# Patient Record
Sex: FEMALE | Race: WHITE | Employment: UNEMPLOYED | URBAN - METROPOLITAN AREA
[De-identification: names, ages, dates, MRNs, and addresses within clinical notes are randomized per-mention and may not be internally consistent; named-entity substitution may affect disease eponyms.]

---

## 2017-05-10 ENCOUNTER — HOSPITAL ENCOUNTER (OUTPATIENT)
Dept: OTHER | Age: 82
Discharge: HOME | End: 2017-05-10
Attending: INTERNAL MEDICINE | Admitting: INTERNAL MEDICINE

## 2017-05-10 LAB
FERRITIN: 168 NG/ML (ref 15–150)
FOLATE: 14.8 NG/ML (ref 3.1–17.5)
IRON: 63 UG/DL (ref 37–145)
PCT TRANSFERRIN: 24 % (ref 10–44)
T3 FREE: 2.4 PG/ML (ref 2.3–4.2)
T4 FREE: 1.67 NG/DL (ref 0.9–1.8)
TOTAL IRON BINDING CAPACITY: 267 UG/DL (ref 250–450)
TSH HIGH SENSITIVITY: 1.83 UIU/ML (ref 0.27–4.2)
UNSATURATED IRON BINDING CAPACITY: 204 UG/DL (ref 110–370)
VITAMIN B-12: 214 PG/ML (ref 211–911)

## 2017-05-12 LAB — T4 TOTAL: 9.54 UG/DL

## 2017-11-15 ENCOUNTER — HOSPITAL ENCOUNTER (OUTPATIENT)
Dept: OTHER | Age: 82
Discharge: HOME | End: 2017-11-15
Attending: INTERNAL MEDICINE | Admitting: INTERNAL MEDICINE

## 2017-11-15 LAB — VITAMIN B-12: 393.4 PG/ML (ref 211–911)

## 2017-11-18 LAB — METHYLMALONIC ACID: 0.16

## 2019-05-13 ENCOUNTER — APPOINTMENT (OUTPATIENT)
Dept: GENERAL RADIOLOGY | Age: 84
DRG: 470 | End: 2019-05-13
Payer: COMMERCIAL

## 2019-05-13 ENCOUNTER — HOSPITAL ENCOUNTER (INPATIENT)
Age: 84
LOS: 4 days | Discharge: NURSING, REHAB OR LONG-TERM CARE | DRG: 470 | End: 2019-05-18
Attending: EMERGENCY MEDICINE | Admitting: INTERNAL MEDICINE
Payer: COMMERCIAL
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
## 2019-05-13 DIAGNOSIS — S72.002A LEFT DISPLACED FEMORAL NECK FRACTURE (HCC): Primary | ICD-10-CM

## 2019-05-13 LAB
ALBUMIN SERPL-MCNC: 4.1 GM/DL (ref 3.4–5)
ALP BLD-CCNC: 87 IU/L (ref 40–129)
ALT SERPL-CCNC: 16 U/L (ref 10–40)
ANION GAP SERPL CALCULATED.3IONS-SCNC: 12 MMOL/L (ref 4–16)
APTT: 24 SECONDS (ref 21.2–33)
AST SERPL-CCNC: 23 IU/L (ref 15–37)
BASOPHILS ABSOLUTE: 0.1 K/CU MM
BASOPHILS RELATIVE PERCENT: 0.6 % (ref 0–1)
BILIRUB SERPL-MCNC: 0.7 MG/DL (ref 0–1)
BUN BLDV-MCNC: 34 MG/DL (ref 6–23)
CALCIUM SERPL-MCNC: 9.1 MG/DL (ref 8.3–10.6)
CHLORIDE BLD-SCNC: 104 MMOL/L (ref 99–110)
CO2: 25 MMOL/L (ref 21–32)
CREAT SERPL-MCNC: 0.9 MG/DL (ref 0.6–1.1)
DIFFERENTIAL TYPE: ABNORMAL
EOSINOPHILS ABSOLUTE: 0.1 K/CU MM
EOSINOPHILS RELATIVE PERCENT: 1.3 % (ref 0–3)
GFR AFRICAN AMERICAN: >60 ML/MIN/1.73M2
GFR NON-AFRICAN AMERICAN: 59 ML/MIN/1.73M2
GLUCOSE BLD-MCNC: 106 MG/DL (ref 70–99)
HCT VFR BLD CALC: 36.7 % (ref 37–47)
HEMOGLOBIN: 11.1 GM/DL (ref 12.5–16)
IMMATURE NEUTROPHIL %: 0.5 % (ref 0–0.43)
INR BLD: 0.93 INDEX
LYMPHOCYTES ABSOLUTE: 0.9 K/CU MM
LYMPHOCYTES RELATIVE PERCENT: 8.3 % (ref 24–44)
MCH RBC QN AUTO: 28.5 PG (ref 27–31)
MCHC RBC AUTO-ENTMCNC: 30.2 % (ref 32–36)
MCV RBC AUTO: 94.3 FL (ref 78–100)
MONOCYTES ABSOLUTE: 0.7 K/CU MM
MONOCYTES RELATIVE PERCENT: 7.3 % (ref 0–4)
NUCLEATED RBC %: 0 %
PDW BLD-RTO: 14.6 % (ref 11.7–14.9)
PLATELET # BLD: 244 K/CU MM (ref 140–440)
PMV BLD AUTO: 9.3 FL (ref 7.5–11.1)
POTASSIUM SERPL-SCNC: 4.2 MMOL/L (ref 3.5–5.1)
PROTHROMBIN TIME: 10.6 SECONDS (ref 9.12–12.5)
RBC # BLD: 3.89 M/CU MM (ref 4.2–5.4)
SEGMENTED NEUTROPHILS ABSOLUTE COUNT: 8.4 K/CU MM
SEGMENTED NEUTROPHILS RELATIVE PERCENT: 82 % (ref 36–66)
SODIUM BLD-SCNC: 141 MMOL/L (ref 135–145)
TOTAL IMMATURE NEUTOROPHIL: 0.05 K/CU MM
TOTAL NUCLEATED RBC: 0 K/CU MM
TOTAL PROTEIN: 7.1 GM/DL (ref 6.4–8.2)
WBC # BLD: 10.2 K/CU MM (ref 4–10.5)

## 2019-05-13 PROCEDURE — 99284 EMERGENCY DEPT VISIT MOD MDM: CPT

## 2019-05-13 PROCEDURE — 72170 X-RAY EXAM OF PELVIS: CPT

## 2019-05-13 PROCEDURE — 86901 BLOOD TYPING SEROLOGIC RH(D): CPT

## 2019-05-13 PROCEDURE — 85610 PROTHROMBIN TIME: CPT

## 2019-05-13 PROCEDURE — 71045 X-RAY EXAM CHEST 1 VIEW: CPT

## 2019-05-13 PROCEDURE — 73552 X-RAY EXAM OF FEMUR 2/>: CPT

## 2019-05-13 PROCEDURE — 96374 THER/PROPH/DIAG INJ IV PUSH: CPT

## 2019-05-13 PROCEDURE — 85025 COMPLETE CBC W/AUTO DIFF WBC: CPT

## 2019-05-13 PROCEDURE — 6360000002 HC RX W HCPCS: Performed by: EMERGENCY MEDICINE

## 2019-05-13 PROCEDURE — 86850 RBC ANTIBODY SCREEN: CPT

## 2019-05-13 PROCEDURE — 85730 THROMBOPLASTIN TIME PARTIAL: CPT

## 2019-05-13 PROCEDURE — 96376 TX/PRO/DX INJ SAME DRUG ADON: CPT

## 2019-05-13 PROCEDURE — 73560 X-RAY EXAM OF KNEE 1 OR 2: CPT

## 2019-05-13 PROCEDURE — 36415 COLL VENOUS BLD VENIPUNCTURE: CPT

## 2019-05-13 PROCEDURE — 80053 COMPREHEN METABOLIC PANEL: CPT

## 2019-05-13 PROCEDURE — 86900 BLOOD TYPING SEROLOGIC ABO: CPT

## 2019-05-13 RX ORDER — FENTANYL CITRATE 50 UG/ML
50 INJECTION, SOLUTION INTRAMUSCULAR; INTRAVENOUS EVERY 30 MIN PRN
Status: COMPLETED | OUTPATIENT
Start: 2019-05-13 | End: 2019-05-14

## 2019-05-13 RX ADMIN — FENTANYL CITRATE 50 MCG: 50 INJECTION INTRAMUSCULAR; INTRAVENOUS at 23:19

## 2019-05-13 RX ADMIN — FENTANYL CITRATE 50 MCG: 50 INJECTION INTRAMUSCULAR; INTRAVENOUS at 22:44

## 2019-05-13 ASSESSMENT — PAIN SCALES - GENERAL
PAINLEVEL_OUTOF10: 10
PAINLEVEL_OUTOF10: 9
PAINLEVEL_OUTOF10: 10

## 2019-05-14 ENCOUNTER — APPOINTMENT (OUTPATIENT)
Dept: GENERAL RADIOLOGY | Age: 84
DRG: 470 | End: 2019-05-14
Payer: COMMERCIAL

## 2019-05-14 ENCOUNTER — ANESTHESIA (OUTPATIENT)
Dept: OPERATING ROOM | Age: 84
DRG: 470 | End: 2019-05-14
Payer: COMMERCIAL

## 2019-05-14 ENCOUNTER — ANESTHESIA EVENT (OUTPATIENT)
Dept: OPERATING ROOM | Age: 84
DRG: 470 | End: 2019-05-14
Payer: COMMERCIAL

## 2019-05-14 VITALS — DIASTOLIC BLOOD PRESSURE: 43 MMHG | TEMPERATURE: 98.6 F | SYSTOLIC BLOOD PRESSURE: 91 MMHG | OXYGEN SATURATION: 93 %

## 2019-05-14 PROBLEM — F03.918 DEMENTIA, IN, SENILITY, WITH BEHAVIORAL DISTURBANCE: Status: ACTIVE | Noted: 2019-05-14

## 2019-05-14 PROBLEM — I25.10 CAD (CORONARY ARTERY DISEASE): Status: ACTIVE | Noted: 2019-05-14

## 2019-05-14 PROBLEM — S72.002A CLOSED DISPLACED FRACTURE OF LEFT FEMORAL NECK (HCC): Status: ACTIVE | Noted: 2019-05-14

## 2019-05-14 PROBLEM — C94.6 MYELODYSPLASTIC DISEASE (HCC): Status: ACTIVE | Noted: 2019-05-14

## 2019-05-14 LAB
ABO/RH: NORMAL
ANTIBODY SCREEN: NEGATIVE
LV EF: 50 %
LVEF MODALITY: NORMAL

## 2019-05-14 PROCEDURE — 76942 ECHO GUIDE FOR BIOPSY: CPT | Performed by: ANESTHESIOLOGY

## 2019-05-14 PROCEDURE — 6360000002 HC RX W HCPCS: Performed by: ORTHOPAEDIC SURGERY

## 2019-05-14 PROCEDURE — 7100000000 HC PACU RECOVERY - FIRST 15 MIN: Performed by: ORTHOPAEDIC SURGERY

## 2019-05-14 PROCEDURE — 2500000003 HC RX 250 WO HCPCS: Performed by: ORTHOPAEDIC SURGERY

## 2019-05-14 PROCEDURE — 3600000014 HC SURGERY LEVEL 4 ADDTL 15MIN: Performed by: ORTHOPAEDIC SURGERY

## 2019-05-14 PROCEDURE — 6360000002 HC RX W HCPCS: Performed by: ANESTHESIOLOGY

## 2019-05-14 PROCEDURE — 3E0T3BZ INTRODUCTION OF ANESTHETIC AGENT INTO PERIPHERAL NERVES AND PLEXI, PERCUTANEOUS APPROACH: ICD-10-PCS | Performed by: ANESTHESIOLOGY

## 2019-05-14 PROCEDURE — C1713 ANCHOR/SCREW BN/BN,TIS/BN: HCPCS | Performed by: ORTHOPAEDIC SURGERY

## 2019-05-14 PROCEDURE — 96376 TX/PRO/DX INJ SAME DRUG ADON: CPT

## 2019-05-14 PROCEDURE — 3700000000 HC ANESTHESIA ATTENDED CARE: Performed by: ORTHOPAEDIC SURGERY

## 2019-05-14 PROCEDURE — 2700000000 HC OXYGEN THERAPY PER DAY

## 2019-05-14 PROCEDURE — 96375 TX/PRO/DX INJ NEW DRUG ADDON: CPT

## 2019-05-14 PROCEDURE — 0SRS01A REPLACEMENT OF LEFT HIP JOINT, FEMORAL SURFACE WITH METAL SYNTHETIC SUBSTITUTE, UNCEMENTED, OPEN APPROACH: ICD-10-PCS | Performed by: UROLOGY

## 2019-05-14 PROCEDURE — P9045 ALBUMIN (HUMAN), 5%, 250 ML: HCPCS | Performed by: NURSE ANESTHETIST, CERTIFIED REGISTERED

## 2019-05-14 PROCEDURE — 94761 N-INVAS EAR/PLS OXIMETRY MLT: CPT

## 2019-05-14 PROCEDURE — 1200000000 HC SEMI PRIVATE

## 2019-05-14 PROCEDURE — 6360000002 HC RX W HCPCS

## 2019-05-14 PROCEDURE — 6370000000 HC RX 637 (ALT 250 FOR IP): Performed by: INTERNAL MEDICINE

## 2019-05-14 PROCEDURE — 7100000001 HC PACU RECOVERY - ADDTL 15 MIN: Performed by: ORTHOPAEDIC SURGERY

## 2019-05-14 PROCEDURE — 3600000004 HC SURGERY LEVEL 4 BASE: Performed by: ORTHOPAEDIC SURGERY

## 2019-05-14 PROCEDURE — 6360000002 HC RX W HCPCS: Performed by: INTERNAL MEDICINE

## 2019-05-14 PROCEDURE — 2709999900 HC NON-CHARGEABLE SUPPLY: Performed by: ORTHOPAEDIC SURGERY

## 2019-05-14 PROCEDURE — 2580000003 HC RX 258: Performed by: INTERNAL MEDICINE

## 2019-05-14 PROCEDURE — 93306 TTE W/DOPPLER COMPLETE: CPT

## 2019-05-14 PROCEDURE — 6360000002 HC RX W HCPCS: Performed by: EMERGENCY MEDICINE

## 2019-05-14 PROCEDURE — 2580000003 HC RX 258: Performed by: ORTHOPAEDIC SURGERY

## 2019-05-14 PROCEDURE — 2500000003 HC RX 250 WO HCPCS: Performed by: NURSE ANESTHETIST, CERTIFIED REGISTERED

## 2019-05-14 PROCEDURE — 2580000003 HC RX 258: Performed by: EMERGENCY MEDICINE

## 2019-05-14 PROCEDURE — 73501 X-RAY EXAM HIP UNI 1 VIEW: CPT

## 2019-05-14 PROCEDURE — 3700000001 HC ADD 15 MINUTES (ANESTHESIA): Performed by: ORTHOPAEDIC SURGERY

## 2019-05-14 PROCEDURE — 6360000002 HC RX W HCPCS: Performed by: NURSE ANESTHETIST, CERTIFIED REGISTERED

## 2019-05-14 PROCEDURE — C1776 JOINT DEVICE (IMPLANTABLE): HCPCS | Performed by: ORTHOPAEDIC SURGERY

## 2019-05-14 PROCEDURE — 6370000000 HC RX 637 (ALT 250 FOR IP): Performed by: ORTHOPAEDIC SURGERY

## 2019-05-14 DEVICE — IMPLANTABLE DEVICE: Type: IMPLANTABLE DEVICE | Site: HIP | Status: FUNCTIONAL

## 2019-05-14 DEVICE — ADAPTER FEM L+0MM UPLR NEUT NK: Type: IMPLANTABLE DEVICE | Site: HIP | Status: FUNCTIONAL

## 2019-05-14 DEVICE — CABLE SURG L635MM DIA1.8MM CO CHROM CERCLAGE CBL RDY: Type: IMPLANTABLE DEVICE | Site: HIP | Status: FUNCTIONAL

## 2019-05-14 DEVICE — AVENIR MÜLLER STEM 4 STANDARD
Type: IMPLANTABLE DEVICE | Site: HIP | Status: FUNCTIONAL
Brand: AVENIR® MÜLLER

## 2019-05-14 RX ORDER — ONDANSETRON 4 MG/1
4 TABLET, ORALLY DISINTEGRATING ORAL EVERY 8 HOURS PRN
Status: DISCONTINUED | OUTPATIENT
Start: 2019-05-14 | End: 2019-05-14

## 2019-05-14 RX ORDER — SODIUM CHLORIDE 9 MG/ML
INJECTION, SOLUTION INTRAVENOUS CONTINUOUS
Status: DISCONTINUED | OUTPATIENT
Start: 2019-05-14 | End: 2019-05-14

## 2019-05-14 RX ORDER — HYDROMORPHONE HCL 110MG/55ML
0.5 PATIENT CONTROLLED ANALGESIA SYRINGE INTRAVENOUS
Status: DISCONTINUED | OUTPATIENT
Start: 2019-05-14 | End: 2019-05-14

## 2019-05-14 RX ORDER — HYDROMORPHONE HCL 110MG/55ML
0.5 PATIENT CONTROLLED ANALGESIA SYRINGE INTRAVENOUS ONCE
Status: COMPLETED | OUTPATIENT
Start: 2019-05-14 | End: 2019-05-14

## 2019-05-14 RX ORDER — HYDROMORPHONE HCL 110MG/55ML
1 PATIENT CONTROLLED ANALGESIA SYRINGE INTRAVENOUS
Status: DISCONTINUED | OUTPATIENT
Start: 2019-05-14 | End: 2019-05-14

## 2019-05-14 RX ORDER — DONEPEZIL HYDROCHLORIDE 5 MG/1
5 TABLET, FILM COATED ORAL NIGHTLY
Status: ON HOLD | COMMUNITY
End: 2019-05-18 | Stop reason: HOSPADM

## 2019-05-14 RX ORDER — ALBUMIN, HUMAN INJ 5% 5 %
SOLUTION INTRAVENOUS PRN
Status: DISCONTINUED | OUTPATIENT
Start: 2019-05-14 | End: 2019-05-14 | Stop reason: SDUPTHER

## 2019-05-14 RX ORDER — LOSARTAN POTASSIUM 100 MG/1
100 TABLET ORAL DAILY
Status: DISCONTINUED | OUTPATIENT
Start: 2019-05-14 | End: 2019-05-15

## 2019-05-14 RX ORDER — HYDROMORPHONE HCL 110MG/55ML
PATIENT CONTROLLED ANALGESIA SYRINGE INTRAVENOUS
Status: COMPLETED
Start: 2019-05-14 | End: 2019-05-14

## 2019-05-14 RX ORDER — OXYCODONE HYDROCHLORIDE 5 MG/1
5 TABLET ORAL EVERY 4 HOURS PRN
Status: DISCONTINUED | OUTPATIENT
Start: 2019-05-14 | End: 2019-05-15

## 2019-05-14 RX ORDER — CARVEDILOL 25 MG/1
25 TABLET ORAL 2 TIMES DAILY WITH MEALS
Status: DISCONTINUED | OUTPATIENT
Start: 2019-05-14 | End: 2019-05-16

## 2019-05-14 RX ORDER — SODIUM CHLORIDE 0.9 % (FLUSH) 0.9 %
10 SYRINGE (ML) INJECTION PRN
Status: DISCONTINUED | OUTPATIENT
Start: 2019-05-14 | End: 2019-05-18 | Stop reason: HOSPADM

## 2019-05-14 RX ORDER — PROPOFOL 10 MG/ML
INJECTION, EMULSION INTRAVENOUS CONTINUOUS PRN
Status: DISCONTINUED | OUTPATIENT
Start: 2019-05-14 | End: 2019-05-14 | Stop reason: SDUPTHER

## 2019-05-14 RX ORDER — SODIUM CHLORIDE 0.9 % (FLUSH) 0.9 %
10 SYRINGE (ML) INJECTION EVERY 12 HOURS SCHEDULED
Status: DISCONTINUED | OUTPATIENT
Start: 2019-05-14 | End: 2019-05-14

## 2019-05-14 RX ORDER — EPHEDRINE SULFATE 50 MG/ML
INJECTION INTRAVENOUS PRN
Status: DISCONTINUED | OUTPATIENT
Start: 2019-05-14 | End: 2019-05-14 | Stop reason: SDUPTHER

## 2019-05-14 RX ORDER — SODIUM CHLORIDE 0.9 % (FLUSH) 0.9 %
10 SYRINGE (ML) INJECTION EVERY 12 HOURS SCHEDULED
Status: DISCONTINUED | OUTPATIENT
Start: 2019-05-14 | End: 2019-05-18 | Stop reason: HOSPADM

## 2019-05-14 RX ORDER — ACETAMINOPHEN 10 MG/ML
1000 INJECTION, SOLUTION INTRAVENOUS ONCE
Status: COMPLETED | OUTPATIENT
Start: 2019-05-14 | End: 2019-05-14

## 2019-05-14 RX ORDER — ACETAMINOPHEN 325 MG/1
650 TABLET ORAL EVERY 6 HOURS SCHEDULED
Status: DISCONTINUED | OUTPATIENT
Start: 2019-05-14 | End: 2019-05-18 | Stop reason: HOSPADM

## 2019-05-14 RX ORDER — ACETAMINOPHEN 325 MG/1
650 TABLET ORAL EVERY 4 HOURS PRN
Status: DISCONTINUED | OUTPATIENT
Start: 2019-05-14 | End: 2019-05-14

## 2019-05-14 RX ORDER — SODIUM CHLORIDE 0.9 % (FLUSH) 0.9 %
10 SYRINGE (ML) INJECTION PRN
Status: DISCONTINUED | OUTPATIENT
Start: 2019-05-14 | End: 2019-05-14

## 2019-05-14 RX ORDER — HYDROMORPHONE HCL 110MG/55ML
1 PATIENT CONTROLLED ANALGESIA SYRINGE INTRAVENOUS
Status: DISCONTINUED | OUTPATIENT
Start: 2019-05-14 | End: 2019-05-15

## 2019-05-14 RX ORDER — ONDANSETRON 2 MG/ML
4 INJECTION INTRAMUSCULAR; INTRAVENOUS EVERY 6 HOURS PRN
Status: DISCONTINUED | OUTPATIENT
Start: 2019-05-14 | End: 2019-05-18 | Stop reason: HOSPADM

## 2019-05-14 RX ORDER — HYDROMORPHONE HCL 110MG/55ML
0.5 PATIENT CONTROLLED ANALGESIA SYRINGE INTRAVENOUS
Status: DISCONTINUED | OUTPATIENT
Start: 2019-05-14 | End: 2019-05-15

## 2019-05-14 RX ORDER — LEVOTHYROXINE SODIUM 0.07 MG/1
75 TABLET ORAL DAILY
Status: DISCONTINUED | OUTPATIENT
Start: 2019-05-14 | End: 2019-05-18 | Stop reason: HOSPADM

## 2019-05-14 RX ORDER — CLINDAMYCIN PHOSPHATE 150 MG/ML
600 INJECTION, SOLUTION INTRAVENOUS ONCE
Status: DISCONTINUED | OUTPATIENT
Start: 2019-05-14 | End: 2019-05-14 | Stop reason: ALTCHOICE

## 2019-05-14 RX ORDER — DEXTROSE, SODIUM CHLORIDE, AND POTASSIUM CHLORIDE 5; .45; .15 G/100ML; G/100ML; G/100ML
INJECTION INTRAVENOUS CONTINUOUS
Status: DISCONTINUED | OUTPATIENT
Start: 2019-05-14 | End: 2019-05-18

## 2019-05-14 RX ORDER — HYDROMORPHONE HCL 110MG/55ML
0.25 PATIENT CONTROLLED ANALGESIA SYRINGE INTRAVENOUS
Status: DISCONTINUED | OUTPATIENT
Start: 2019-05-14 | End: 2019-05-14

## 2019-05-14 RX ORDER — LORAZEPAM 2 MG/ML
1 INJECTION INTRAMUSCULAR ONCE
Status: COMPLETED | OUTPATIENT
Start: 2019-05-14 | End: 2019-05-14

## 2019-05-14 RX ORDER — DOCUSATE SODIUM 100 MG/1
100 CAPSULE, LIQUID FILLED ORAL 2 TIMES DAILY
Status: DISCONTINUED | OUTPATIENT
Start: 2019-05-14 | End: 2019-05-18 | Stop reason: HOSPADM

## 2019-05-14 RX ORDER — HALOPERIDOL 5 MG/ML
1 INJECTION INTRAMUSCULAR EVERY 6 HOURS PRN
Status: DISCONTINUED | OUTPATIENT
Start: 2019-05-14 | End: 2019-05-15

## 2019-05-14 RX ADMIN — ALBUMIN (HUMAN) 250 ML: 12.5 INJECTION, SOLUTION INTRAVENOUS at 17:09

## 2019-05-14 RX ADMIN — PHENYLEPHRINE HYDROCHLORIDE 100 MCG: 10 INJECTION INTRAVENOUS at 17:04

## 2019-05-14 RX ADMIN — PHENYLEPHRINE HYDROCHLORIDE 200 MCG: 10 INJECTION INTRAVENOUS at 16:50

## 2019-05-14 RX ADMIN — EPHEDRINE SULFATE 15 MG: 50 INJECTION, SOLUTION INTRAVENOUS at 17:19

## 2019-05-14 RX ADMIN — PHENYLEPHRINE HYDROCHLORIDE 100 MCG: 10 INJECTION INTRAVENOUS at 16:43

## 2019-05-14 RX ADMIN — PHENYLEPHRINE HYDROCHLORIDE 200 MCG: 10 INJECTION INTRAVENOUS at 17:14

## 2019-05-14 RX ADMIN — SODIUM CHLORIDE: 9 INJECTION, SOLUTION INTRAVENOUS at 13:48

## 2019-05-14 RX ADMIN — PHENYLEPHRINE HYDROCHLORIDE 100 MCG: 10 INJECTION INTRAVENOUS at 16:38

## 2019-05-14 RX ADMIN — PROPOFOL 50 MCG/KG/MIN: 10 INJECTION, EMULSION INTRAVENOUS at 16:12

## 2019-05-14 RX ADMIN — FENTANYL CITRATE 50 MCG: 50 INJECTION INTRAMUSCULAR; INTRAVENOUS at 00:02

## 2019-05-14 RX ADMIN — EPHEDRINE SULFATE 10 MG: 50 INJECTION, SOLUTION INTRAVENOUS at 17:21

## 2019-05-14 RX ADMIN — LORAZEPAM 1 MG: 2 INJECTION INTRAMUSCULAR; INTRAVENOUS at 10:37

## 2019-05-14 RX ADMIN — ACETAMINOPHEN 650 MG: 325 TABLET ORAL at 22:34

## 2019-05-14 RX ADMIN — SODIUM CHLORIDE: 9 INJECTION, SOLUTION INTRAVENOUS at 01:46

## 2019-05-14 RX ADMIN — PHENYLEPHRINE HYDROCHLORIDE 200 MCG: 10 INJECTION INTRAVENOUS at 17:11

## 2019-05-14 RX ADMIN — HYDROMORPHONE HYDROCHLORIDE 0.5 MG: 2 INJECTION, SOLUTION INTRAMUSCULAR; INTRAVENOUS; SUBCUTANEOUS at 05:54

## 2019-05-14 RX ADMIN — EPHEDRINE SULFATE 25 MG: 50 INJECTION, SOLUTION INTRAVENOUS at 17:18

## 2019-05-14 RX ADMIN — HYDROMORPHONE HYDROCHLORIDE 0.5 MG: 2 INJECTION, SOLUTION INTRAMUSCULAR; INTRAVENOUS; SUBCUTANEOUS at 02:57

## 2019-05-14 RX ADMIN — CARVEDILOL 25 MG: 25 TABLET, FILM COATED ORAL at 09:18

## 2019-05-14 RX ADMIN — DEXTROSE MONOHYDRATE, SODIUM CHLORIDE, AND POTASSIUM CHLORIDE: 50; 4.5; 1.49 INJECTION, SOLUTION INTRAVENOUS at 18:12

## 2019-05-14 RX ADMIN — ACETAMINOPHEN 1000 MG: 10 INJECTION, SOLUTION INTRAVENOUS at 15:15

## 2019-05-14 RX ADMIN — PHENYLEPHRINE HYDROCHLORIDE 100 MCG: 10 INJECTION INTRAVENOUS at 16:47

## 2019-05-14 RX ADMIN — PHENYLEPHRINE HYDROCHLORIDE 200 MCG: 10 INJECTION INTRAVENOUS at 17:06

## 2019-05-14 RX ADMIN — DEXTROSE MONOHYDRATE 600 MG: 50 INJECTION, SOLUTION INTRAVENOUS at 16:27

## 2019-05-14 RX ADMIN — PHENYLEPHRINE HYDROCHLORIDE 100 MCG: 10 INJECTION INTRAVENOUS at 17:00

## 2019-05-14 RX ADMIN — PHENYLEPHRINE HYDROCHLORIDE 100 MCG: 10 INJECTION INTRAVENOUS at 16:30

## 2019-05-14 RX ADMIN — PHENYLEPHRINE HYDROCHLORIDE 100 MCG: 10 INJECTION INTRAVENOUS at 16:45

## 2019-05-14 RX ADMIN — HYDROMORPHONE HYDROCHLORIDE 0.5 MG: 2 INJECTION, SOLUTION INTRAMUSCULAR; INTRAVENOUS; SUBCUTANEOUS at 01:00

## 2019-05-14 RX ADMIN — PHENYLEPHRINE HYDROCHLORIDE 100 MCG: 10 INJECTION INTRAVENOUS at 16:41

## 2019-05-14 RX ADMIN — DOCUSATE SODIUM 100 MG: 100 CAPSULE, LIQUID FILLED ORAL at 22:34

## 2019-05-14 RX ADMIN — HYDROMORPHONE HYDROCHLORIDE 1 MG: 2 INJECTION, SOLUTION INTRAMUSCULAR; INTRAVENOUS; SUBCUTANEOUS at 09:18

## 2019-05-14 ASSESSMENT — PULMONARY FUNCTION TESTS
PIF_VALUE: 1
PIF_VALUE: 0
PIF_VALUE: 0
PIF_VALUE: 1
PIF_VALUE: 0
PIF_VALUE: 1
PIF_VALUE: 0
PIF_VALUE: 1
PIF_VALUE: 0
PIF_VALUE: 1
PIF_VALUE: 0
PIF_VALUE: 1
PIF_VALUE: 0
PIF_VALUE: 0
PIF_VALUE: 1
PIF_VALUE: 0

## 2019-05-14 ASSESSMENT — PAIN SCALES - GENERAL
PAINLEVEL_OUTOF10: 10
PAINLEVEL_OUTOF10: 0
PAINLEVEL_OUTOF10: 10
PAINLEVEL_OUTOF10: 0
PAINLEVEL_OUTOF10: 6
PAINLEVEL_OUTOF10: 10
PAINLEVEL_OUTOF10: 10
PAINLEVEL_OUTOF10: 0
PAINLEVEL_OUTOF10: 10
PAINLEVEL_OUTOF10: 6

## 2019-05-14 NOTE — BRIEF OP NOTE
BRIEF OPERATIVE NOTE    Patient name  Mel Christensen  MRN    [id number]    Date of Procedure 5/14/2019        Preoperative Diagnosis: Left Femoral Neck Fracture    Postoperative Diagnosis:  Left Femoral Neck Fracture    Procedure:     Left Hip Hemiarthroplasty             Surgeon:     Mike Jimenez. Angela Rick MD    Assistant:     Feli Tavarez PA-C    Anesthesia:    Spinal Anesthesia and Regional    Estimated blood loss:  200 ml    Drains:     1 Medium Hemovac    Specimens:   None    Complications:     None      Mike Jimenez.  Angela Rick MD

## 2019-05-14 NOTE — ANESTHESIA PRE PROCEDURE
Department of Anesthesiology  Preprocedure Note       Name:  Cliff Marquez   Age:  80 y.o.  :  10/2/1931                                          MRN:  [id number]         Date:  2019      Surgeon: Estefania Davis):  Jerome Paget, MD    Procedure: HIP HEMIARTHROPLASTY (Left )    Medications prior to admission:   Prior to Admission medications    Medication Sig Start Date End Date Taking? Authorizing Provider   donepezil (ARICEPT) 5 MG tablet Take 5 mg by mouth nightly    Historical Provider, MD   guaiFENesin-codeine (GUAIFENESIN AC) 100-10 MG/5ML liquid Take 10 mLs by mouth every 6 hours as needed for Cough. 14   Filomena Hensley MD   carvedilol (COREG) 25 MG tablet Take 25 mg by mouth 2 times daily (with meals). Historical Provider, MD   losartan (COZAAR) 100 MG tablet Take 100 mg by mouth daily. Historical Provider, MD   MAGNESIUM-ZINC PO Take 1 tablet by mouth daily. Historical Provider, MD   levothyroxine (SYNTHROID) 75 MCG tablet Take 75 mcg by mouth Daily. Historical Provider, MD   aspirin 81 MG tablet Take 81 mg by mouth daily.     Historical Provider, MD       Current medications:    Current Facility-Administered Medications   Medication Dose Route Frequency Provider Last Rate Last Dose    0.9 % sodium chloride infusion   Intravenous Continuous Philip Hu  mL/hr at 19 0146      carvedilol (COREG) tablet 25 mg  25 mg Oral BID WC Philip Hu MD   25 mg at 19 0918    levothyroxine (SYNTHROID) tablet 75 mcg  75 mcg Oral Daily Philip Hu MD        losartan (COZAAR) tablet 100 mg  100 mg Oral Daily Philip Hu MD        sodium chloride flush 0.9 % injection 10 mL  10 mL Intravenous 2 times per day Philip Hu MD        sodium chloride flush 0.9 % injection 10 mL  10 mL Intravenous PRN Philip Hu MD        acetaminophen (TYLENOL) tablet 650 mg  650 mg Oral Q4H PRN Philip Hu MD        ondansetron (ZOFRAN-ODT) disintegrating tablet 4 mg  4 mg Oral Q8H PRN Mariela Hi MD        HYDROmorphone (DILAUDID) injection 0.5 mg  0.5 mg Intravenous Q3H PRN Taylor Mckeon MD        Or    HYDROmorphone (DILAUDID) injection 1 mg  1 mg Intravenous Q3H PRN Taylor Mckeon MD   1 mg at 05/14/19 4739       Allergies: Allergies   Allergen Reactions    Pcn [Penicillins]        Problem List:    Patient Active Problem List   Diagnosis Code    Chest pain R07.9    Closed displaced fracture of left femoral neck ([facility] [address] S72.002A       Past Medical History:        Diagnosis Date    Hypertension     Thyroid disease        Past Surgical History:  No past surgical history on file. Social History:    Social History     Tobacco Use    Smoking status: Never Smoker   Substance Use Topics    Alcohol use: No                                Counseling given: Not Answered      Vital Signs (Current):   Vitals:    05/14/19 0200 05/14/19 0230 05/14/19 0559 05/14/19 0918   BP: 132/63 (!) 142/74 135/63 (!) 160/90   Pulse: 91 94 95 98   Resp: 27 22 17    Temp:  36.7 °C (98.1 °F) 36.9 °C (98.5 °F)    TempSrc:  Oral Oral    SpO2: 97% 99% 99%    Weight:       Height:                                                  BP Readings from Last 3 Encounters:   05/14/19 (!) 160/90   07/18/16 146/78       NPO Status:                                                                                 BMI:   Wt Readings from Last 3 Encounters:   05/13/19 139 lb 15.9 oz (63.5 kg)   07/18/16 140 lb (63.5 kg)     Body mass index is 27.48 kg/m².     CBC:   Lab Results   Component Value Date    WBC 10.2 05/13/2019    RBC 3.89 05/13/2019    HGB 11.1 05/13/2019    HCT 36.7 05/13/2019    MCV 94.3 05/13/2019    RDW 14.6 05/13/2019     05/13/2019       CMP:   Lab Results   Component Value Date     05/13/2019    K 4.2 05/13/2019     05/13/2019    CO2 25 05/13/2019    BUN 34 05/13/2019    CREATININE 0.9 05/13/2019    GFRAA >60 05/13/2019    LABGLOM 59 05/13/2019    GLUCOSE 106 05/13/2019

## 2019-05-14 NOTE — ANESTHESIA PROCEDURE NOTES
Spinal Block    Patient location during procedure: OR  Start time: 5/14/2019 4:15 PM  End time: 5/14/2019 4:22 PM  Reason for block: primary anesthetic  Staffing  Anesthesiologist: Ellen Dutton MD  Resident/CRNA: CELI Barrios CRNA  Performed: resident/CRNA   Preanesthetic Checklist  Completed: patient identified, site marked, surgical consent, pre-op evaluation, timeout performed, IV checked, risks and benefits discussed, monitors and equipment checked, anesthesia consent given, oxygen available and patient being monitored  Spinal Block  Patient position: left lateral decubitus  Prep: ChloraPrep  Patient monitoring: cardiac monitor, continuous pulse ox, continuous capnometry and frequent blood pressure checks  Approach: midline  Location: L3/L4  Provider prep: mask and sterile gloves  Local infiltration: lidocaine  Agent: bupivacaine  Dose: 1.7  Dose: 1.7  Needle  Needle type: Quincke   Needle gauge: 22 G  Needle length: 3.5 in  Assessment  Sensory level: T6  Events: cerebrospinal fluid  Swirl obtained: Yes  CSF: clear  Attempts: 1  Hemodynamics: stable

## 2019-05-14 NOTE — ED NOTES
01:13 paged Dr Yola Coreas thru perfect serve     [facility] Sc[facility]  05/14/19 5447

## 2019-05-14 NOTE — ED PROVIDER NOTES
status: Never Smoker   Substance and Sexual Activity    Alcohol use: No    Drug use: No    Sexual activity: Not on file   Lifestyle    Physical activity:     Days per week: Not on file     Minutes per session: Not on file    Stress: Not on file   Relationships    Social connections:     Talks on phone: Not on file     Gets together: Not on file     Attends [employer]ism service: Not on file     Active member of club or organization: Not on file     Attends meetings of clubs or organizations: Not on file     Relationship status: Not on file    Intimate partner violence:     Fear of current or ex partner: Not on file     Emotionally abused: Not on file     Physically abused: Not on file     Forced sexual activity: Not on file   Other Topics Concern    Not on file   Social History Narrative    Not on file     Current Facility-Administered Medications   Medication Dose Route Frequency Provider Last Rate Last Dose    HYDROmorphone (DILAUDID) injection 0.5 mg  0.5 mg Intravenous Once Fara Phillips MD         Current Outpatient Medications   Medication Sig Dispense Refill    guaiFENesin-codeine (GUAIFENESIN AC) 100-10 MG/5ML liquid Take 10 mLs by mouth every 6 hours as needed for Cough. 420 mL 1    carvedilol (COREG) 25 MG tablet Take 25 mg by mouth 2 times daily (with meals).  losartan (COZAAR) 100 MG tablet Take 100 mg by mouth daily.  MAGNESIUM-ZINC PO Take 1 tablet by mouth daily.  levothyroxine (SYNTHROID) 75 MCG tablet Take 75 mcg by mouth Daily.  aspirin 81 MG tablet Take 81 mg by mouth daily. Allergies   Allergen Reactions    Pcn [Penicillins]        Nursing Notes Reviewed    Physical Exam:  ED Triage Vitals [05/13/19 2232]   Enc Vitals Group      BP (!) 157/66      Pulse 90      Resp 18      Temp 97.9 °F (36.6 °C)      Temp Source Oral      SpO2 98 %      Weight       Height       Head Circumference       Peak Flow       Pain Score       Pain Loc       Pain Edu? Excl.  in [address]? My pulse ox interpretation is - 98% on NC O2    General appearance:  No acute distress. Patient appears stated age. Pleasant. Skin:  Warm. Dry. No laceration or bleeding noted to the affected left hip or leg. Eye:  Extraocular movements intact. Ears, nose, mouth and throat:  No cephalohematoma, cordova sign or raccoon eyes. Midface is stable. No dental malocclusion. Neck:  Trachea midline. No midline bony cervical tenderness. Extremity:  No swelling other than the left hip and left knee. Normal ROM. No gross deformity ×3 extremities (see LLE). Extremities are nontender other than LLE.  LLE:  Left lower extremity is slightly shorter than right and held in external rotation. Point tenderness at the left hip and proximal thigh as well as left knee. Soft compartments. Strong DP and PT pulses are easily palpable. Sensation is intact globally light touch to distal left lower extremity. Strong and symmetric dorsi/plantar flexion. Patient easily able to wiggle all toes. Heart:  Regular rate and rhythm, normal S1 & S2, no extra heart sounds. Perfusion:  Intact   Respiratory:  Lungs clear to auscultation bilaterally. Respirations nonlabored. Chest wall is nontender. No crepitance. Abdominal:  Normal bowel sounds. Soft. Nontender. Non distended. Back:  No midline bony TL tenderness or step-off. Sacrum is with point tenderness palpation into the left gluteal region. Neurological:  Alert and oriented times 3. No focal neuro deficits. Sensation intact to light touch to distal upper/lower extremities; 5/5 and symmetric  and dorsi/plantar flexion. No saddle anesthesia.             Psychiatric:  Appropriate    I have reviewed and interpreted all of the currently available lab results from this visit (if applicable):  Results for orders placed or performed during the hospital encounter of 05/13/19   PT - INR   Result Value Ref Range    Protime 10.6 9.12 - 12.5 SECONDS    INR 0.93 INDEX PTT   Result Value Ref Range    aPTT 24.0 21.2 - 33.0 SECONDS   CBC auto diff   Result Value Ref Range    WBC 10.2 4.0 - 10.5 K/CU MM    RBC 3.89 (L) 4.2 - 5.4 M/CU MM    Hemoglobin 11.1 (L) 12.5 - 16.0 GM/DL    Hematocrit 36.7 (L) 37 - 47 %    MCV 94.3 78 - 100 FL    MCH 28.5 27 - 31 PG    MCHC 30.2 (L) 32.0 - 36.0 %    RDW 14.6 11.7 - 14.9 %    Platelets 349 276 - 574 K/CU MM    MPV 9.3 7.5 - 11.1 FL    Differential Type AUTOMATED DIFFERENTIAL     Segs Relative 82.0 (H) 36 - 66 %    Lymphocytes % 8.3 (L) 24 - 44 %    Monocytes % 7.3 (H) 0 - 4 %    Eosinophils % 1.3 0 - 3 %    Basophils % 0.6 0 - 1 %    Segs Absolute 8.4 K/CU MM    Lymphocytes # 0.9 K/CU MM    Monocytes # 0.7 K/CU MM    Eosinophils # 0.1 K/CU MM    Basophils # 0.1 K/CU MM    Nucleated RBC % 0.0 %    Total Nucleated RBC 0.0 K/CU MM    Total Immature Neutrophil 0.05 K/CU MM    Immature Neutrophil % 0.5 (H) 0 - 0.43 %   CMP   Result Value Ref Range    Sodium 141 135 - 145 MMOL/L    Potassium 4.2 3.5 - 5.1 MMOL/L    Chloride 104 99 - 110 mMol/L    CO2 25 21 - 32 MMOL/L    BUN 34 (H) 6 - 23 MG/DL    CREATININE 0.9 0.6 - 1.1 MG/DL    Glucose 106 (H) 70 - 99 MG/DL    Calcium 9.1 8.3 - 10.6 MG/DL    Alb 4.1 3.4 - 5.0 GM/DL    Total Protein 7.1 6.4 - 8.2 GM/DL    Total Bilirubin 0.7 0.0 - 1.0 MG/DL    ALT 16 10 - 40 U/L    AST 23 15 - 37 IU/L    Alkaline Phosphatase 87 40 - 129 IU/L    GFR Non- 59 (L) >60 mL/min/1.73m2    GFR African American >60 >60 mL/min/1.73m2    Anion Gap 12 4 - 16   TYPE AND SCREEN   Result Value Ref Range    ABO/Rh A POSITIVE     Antibody Screen NEGATIVE       Radiographs (if obtained):  [] The following radiograph was interpreted by myself in the absence of a radiologist:   [x] Radiologist's Report Reviewed:  XR KNEE LEFT (1-2 VIEWS)   Preliminary Result   Osteoarthritis involving the lateral compartment. No acute osseous   abnormality.          XR FEMUR LEFT (MIN 2 VIEWS)   Preliminary Result   Left femoral neck fracture. XR PELVIS (1-2 VIEWS)   Preliminary Result   Acute left femoral neck fracture with varus angulation and displacement. XR CHEST PORTABLE   Final Result   COPD. No acute findings. EKG (if obtained): (All EKG's are interpreted by myself in the absence of a cardiologist)    Chart review shows recent radiographs:  No results found. MDM:  Pt presents as above. Emergent conditions considered. Presentation prompted initial x-ray imaging as above. IV was confirmed and IV pain medications were given. Patient Nothing by mouth. X-rays demonstrating a left femoral neck fracture. Preoperative labs were obtained. Patient with minimal response to fentanyl x3 doses and decision to trial dilaudid. Family preference in Dr. Elly Dixon for surgery. I did speak with Dr. Elly Dixon and he is able to see the patient this morning to assist with the fracture. Patient was discussed with her primary care provider team (Dr. Perla Wilkerson) and she is requesting that I place bridging orders which were done including holding patient's aspirin. Maintenance fluids were initiated and patient kept nothing by mouth. Armenta catheter placed in the emergency department. Questions sought and answered with the patient's family. They voice understanding and agree with plan. Clinical Impression:  1. Left displaced femoral neck fracture ([address]    2. Fall, initial encounter      Disposition referral (if applicable):  No follow-up provider specified. Disposition medications (if applicable):  New Prescriptions    No medications on file       Comment: Please note this report has been produced using speech recognition software and may contain errors related to that system including errors in grammar, punctuation, and spelling, as well as words and phrases that may be inappropriate. If there are any questions or concerns please feel free to contact the dictating provider for clarification.        José Antonio Jackman

## 2019-05-14 NOTE — ED TRIAGE NOTES
Patient arrived to ED via EMS for tripping and falling at home and is complaining of left hip pain and tail bone pain. Patient denies head injury and loss of consciousness.

## 2019-05-14 NOTE — CONSULTS
changes  ENT:  No nasal drainage or ear pain  CV:  No chest pain  PULM:  No cough, no shortness of breath  GI:  No nausea, vomiting, diarrhea  :  No dysuria  Musc:  See HPI  Neuro: No numbness, tingling or parethesias  Skin:  No rashes  Psych: No depression symptoms    Physical Exam:    Vitals: /63   Pulse 95   Temp 98.5 °F (36.9 °C) (Oral)   Resp 17   Ht 4' 11.84\" (1.52 m)   Wt 139 lb 15.9 oz (63.5 kg)   SpO2 99%   BMI 27.48 kg/m² ,  Body mass index is 27.48 kg/m². General appearance: alert, appears stated age and cooperative  Skin: Skin color, texture, turgor normal. No rashes or lesions  HEENT: Head: Normocephalic, no lesions, without obvious abnormality. Neck: no adenopathy, no carotid bruit, no JVD, supple, symmetrical, trachea midline and thyroid not enlarged, symmetric, no tenderness/mass/nodules  masses,  no organomegaly\"}  Extremities:    - Left hip tender to palpation.     -Pain with PROM to hip, full assessment not done due to known fracture   -No knee or ankle deformity or significant tenderness   -Good distal pulses with good capillary refill sensation intact to light touch   -Able to dorsiflex and plantar flex ankle and toes-bilaterally   -No tenderness over bilateral wrist, elbow or shoulders. Neurologic: Mental status: Alert, oriented, thought content appropriate    Labs     CBC:   Recent Labs     05/13/19  2305   WBC 10.2   HGB 11.1*        BMP:    Recent Labs     05/13/19  2305      K 4.2      CO2 25   BUN 34*   CREATININE 0.9   GLUCOSE 106*     INR:    Lab Results   Component Value Date    INR 0.93 05/13/2019    PROTIME 10.6 05/13/2019        X-ray view   Imaging Review-X-rays were personally reviewed and interpreted by myself        Assessment and Plan     Patient Active Problem List   Diagnosis Code    Chest pain R07.9    Closed displaced fracture of left femoral neck ([facility] [address] S72.002A       1.   Left Hip femoral neck fracture      Based on the fracture pattern I recommend hemiarthroplasty. The goal of surgical intervention is pain control and mobility. Postoperatively the patient will remain in the hospital for pain control, physical therapy, as well as DVT prophylaxis. Surgical risks were explained to the patient as well as the family; these included but not limited to infection,dislocation, periprosthetic fracture, continued pain after surgical intervention. Risks also include post-operative Deep venous thrombosis, heart attack, stroke and death. There is also at risk for loss of ambulatory status or a decreased level of ambulatory status. Postoperatively the patient will be weightbearing as tolerated and physical therapy will work with the patient on a daily basis. We will order the use incentive spirometry to prevent pneumonia postoperatively. The patient will receive 24 hours of antibiotics postoperatively and the Armenta will be planned to be discontinued on postoperative day #2. Patient will be given both chemo (Lovenox) and mechanical (PHILOMENA hoes and SCD's) DVT prophylaxis post-operatively. Post-operatively, we will assess the patients discharge needs with the help of case management. The patient will likely require a stay at a skilled nursing unit or ARU. The patient and family understands the risks and benefits of surgery and wishes to proceed with operative intervention.      Taylor Mckeon MD

## 2019-05-14 NOTE — OP NOTE
OPERATIVE NOTE      Lucy Witt    (10/2/1931)    Date of procedure  5/14/2019    Preoperative Diagnosis:  Left Femoral Neck Fracture    Postoperative Diagnosis:  Left Femoral Neck Fracture    Procedure:    Left Hip Hemiarthroplasty             Surgeon:    Casey Rojo MD    Assistant:    Frankey Giovanni PA-C who assisted in exposure of the hip protection of neurovascular structures, final implantation of the components, closure of the wound, application of sterile dressing and transfer of patient. Anesthesia:    General endotrachial anesthesia and Regional    Estimated blood loss:   200 ml    Drains:    1 Medium Hemovac    Specimens:   None    Complications:    None    Implants:   Carmen (Avenir) Femur size-4, head 45mm, neck-neutral    Surgical Indications  The patient presented to the emergency room and was diagnosed with a femoral neck fracture. The patient was admitted to the hospital and received a medical conssult and anesthesia evaluation. The patient chose to proceed with hip hemiarthroplasty. Risks, benefits, expected outcomes and potential complications were discussed as outline in the H&P. At no time were any guarantees implied or stated. Informed consent was obtained. Patient Positioning and Surgical Prep  The patient was seen in the holding area and the appropriate extremity marked with an indelible pen. The patient was taken to the operative suite, identified and while on her hospital bed, anesthesia was performed. The patient was transferred to the OR table and placed in the lateral decubitus position supported with the peg board. The lower extremity was prepped from the flank to leg and then draped in the normal sterile fashion. Time out  A time out was performed confirming the patients name, operative side, antibiotics given, known allergies and proposed procedure. Once all in agreement the procedure was started.     Exposure  Posterior approach: 20 CM curvilinear incision centered posterior to the greater trochanter was made. Gluteal fascia and IT band were incised, exposing the external rotators. The piriformis was cut and then tagged for later repair. Then the short external rotators were pelled back exposing the capsule. Then a T-shaped capsuleotomy was performed and the capsule was tagged with a #2 forcefiber. A neck osteotomy was then perfomed with a saw blade 10mm above the femoral neck  proximal to the lesser trochanter. The femoral head was removed and measured. Femoral Preparation- Uncemented     The proximal femur was exposed. The femoral canal was entered using a straight awl. The box osteotome was used to open the proximal femur. We then placed a prophylactic cerclage cable just below the lesser trochanter to prevent fracture during broaching and final stem implantation. The femoral canal was sequentially broached up to a size 4 femur. Trials were then assembled with varying neck lengths and femoral head sizes and stability was tested with flexion, internal rotation, external rotation and extension. We felt that a femoral head size 56 with a neutral neck length had good stability and leg length. The final Carmen Avenir femur size 4 press fit component was impacted into place. There was no evidence of a femoral fracture and the component was stable with rotational forces applied. Final Component Placement and Reduction  The a 45mm size femoral head trial was attached to the stem with a neutral neck length. The hip was reduced, taken through a full range of motion and noted to be stable. The hip was dislocated and the final unipolar head impacted onto the stem. The hip was again reduced and taken through a full range of motion. It was stable in all planes and leg length was grossly equivalent.     Closure and Disposition  The hip capsule and external rotators were re-approximated to the greater trochanter using two #2 Fiberwire sutures placed through drill hole. .  The gluteal fascia and IT band were re approximated with #2 fiber wire. The subcutaneus layer was closed with 2-0 vicryl. The skin layer was closed with staples. Sterile dressings and an abduction pillow were applied. A medium hemovac was placed prior to closure. All sponge and needle counts were correct. The patient was taken to the postoperative area in stable condition.   I spoke with the patients family in the consultation room postoperatively    Gray West MD

## 2019-05-14 NOTE — ED NOTES
00:59 paged Dr Ajit Calles thru perfect serve     Fabby Severino [facility]  05/14/19 1801

## 2019-05-14 NOTE — ED NOTES
01:18 Dr Kim Dias returned call -- parked call @ [phone] -- notified Dr Carrie Olivares [address]  05/14/19 1666

## 2019-05-14 NOTE — CONSULTS
Dictated --[id number]  Hx of non ischemic CM  Today echo shows 40-50% range, unchanged  Had a cath in 2013 mild CAD noted  Moderate risk but stale to proceed with surgery  Would follow  Keep on meds and telemetry

## 2019-05-14 NOTE — ANESTHESIA PROCEDURE NOTES
Peripheral Block    Patient location during procedure: pre-op  Start time: 5/14/2019 3:55 PM  End time: 5/14/2019 4:05 PM  Staffing  Anesthesiologist: Amanda Chang MD  Resident/CRNA: CELI Varghese CRNA  Performed: resident/CRNA   Preanesthetic Checklist  Completed: patient identified, site marked, surgical consent, pre-op evaluation, timeout performed, IV checked, risks and benefits discussed, monitors and equipment checked, anesthesia consent given, oxygen available and patient being monitored  Peripheral Block  Patient position: supine  Prep: ChloraPrep  Patient monitoring: cardiac monitor, continuous pulse ox, continuous capnometry, frequent blood pressure checks and IV access  Block type: Fascia iliaca  Laterality: left  Injection technique: single-shot  Procedures: ultrasound guided  Local infiltration: ropivacaine and decadron  Infiltration strength: 0.25 %  Dose: 60 mL  Provider prep: mask and sterile gloves  Local infiltration: ropivacaine and decadron  Needle  Needle type: combined needle/nerve stimulator   Needle gauge: 20 G  Needle length: 5 cm  Needle localization: ultrasound guidance  Assessment  Injection assessment: negative aspiration for heme, no paresthesia on injection and local visualized surrounding nerve on ultrasound  Paresthesia pain: none  Slow fractionated injection: yes  Hemodynamics: stable  Reason for block: post-op pain management

## 2019-05-14 NOTE — ED NOTES
Bed: ED-15  Expected date:   Expected time:   Means of arrival:   Comments:  L hip pain     Howard Mariaa Mott, CHRISTIANO  05/13/19 4331

## 2019-05-14 NOTE — PROGRESS NOTES
1740: Arrived to PACU from OR. Monitors applied, alarms on. Report obtained from [facility]. Able to wiggle toes and has sensation to toes. Denies pain. 1750: X-rays taken. Turned and repositioned in bed. No redness or swelling noted at spinal insertion site. Heels off bed, able to dorsiflex left foot. 1815: Dozes, arouses easily. Able to dorsiflex left foot, denies pain. 1837: Transported to room 4014. Family at bedside.

## 2019-05-14 NOTE — ANESTHESIA POSTPROCEDURE EVALUATION
Department of Anesthesiology  Postprocedure Note    Patient: Carli Martínez  MRN: [id number]  YOB: 1931  Date of evaluation: 5/14/2019  Time:  5:44 PM     Procedure Summary     Date:  05/14/19 Room / Location:  1200 [facility] OR 04 / 1200 [facility] OR    Anesthesia Start:  7837 Anesthesia Stop:  1744    Procedure:  HIP HEMIARTHROPLASTY (Left Hip) Diagnosis:  (LEFT HIP FX)    Surgeon:  Lacy Engel MD Responsible Provider:  Heather Raza MD    Anesthesia Type:  regional, spinal ASA Status:  2          Anesthesia Type: regional, spinal    Debra Phase I:      Debra Phase II:      Last vitals: Reviewed and per EMR flowsheets.        Anesthesia Post Evaluation    Patient location during evaluation: PACU  Patient participation: complete - patient participated  Level of consciousness: awake and alert  Pain score: 0  Airway patency: patent  Nausea & Vomiting: no nausea and no vomiting  Complications: no  Cardiovascular status: blood pressure returned to baseline  Respiratory status: acceptable, nasal cannula, spontaneous ventilation and nonlabored ventilation

## 2019-05-14 NOTE — ED NOTES
00:47 repaged Dr Ankit Mcghee @ [phone] -- left message to call ROBERT BOYD  05/14/19 [id number]

## 2019-05-14 NOTE — H&P
History and Physical        CHIEF COMPLAINT:  Fall and fracture Femur      HISTORY OF PRESENT ILLNESS:      The patient is a 80 y.o. female with significant past medical history of CAD, HTN, Hypothyroidism, age related cognitive decline,  Myelodysplastic syndromewho presented to ER with reports of her tripping and falling at home, landing on her left side. Patient did not hit her head or lose consciousness. History obtained from her relative and ER notes. Patient sedated with Ativan and not able to get history. She had left hip pain, tailbone pain and left knee severe pain. No numbness or weakness. No head, neck or upper back pains. No chest pain or shortness of breath. No abdominal pain. No pain to the extremities. Patient was not able to put any weight on the left lower extremity after a fall. Past Medical History:        Diagnosis Date    Hypertension     Thyroid disease      Past Surgical History:    No past surgical history on file. Medications Prior to Admission:   Medications Prior to Admission: donepezil (ARICEPT) 5 MG tablet, Take 5 mg by mouth nightly  guaiFENesin-codeine (GUAIFENESIN AC) 100-10 MG/5ML liquid, Take 10 mLs by mouth every 6 hours as needed for Cough. carvedilol (COREG) 25 MG tablet, Take 25 mg by mouth 2 times daily (with meals). losartan (COZAAR) 100 MG tablet, Take 100 mg by mouth daily. MAGNESIUM-ZINC PO, Take 1 tablet by mouth daily. levothyroxine (SYNTHROID) 75 MCG tablet, Take 75 mcg by mouth Daily. aspirin 81 MG tablet, Take 81 mg by mouth daily. Allergies:  Pcn [penicillins]    Social History:   TOBACCO:   reports that she has never smoked. She does not have any smokeless tobacco history on file. ETOH:   reports that she does not drink alcohol. DRUGS:   reports that she does not use drugs. Family History:   No family history on file.   REVIEW OF SYSTEMS:  Review of systems not obtained due to patient factors - mental status  PHYSICAL EXAM:    Vitals:  BP (!) 113/58   Pulse 85   Temp 98.5 °F (36.9 °C) (Axillary)   Resp 19   Ht 4' 11.84\" (1.52 m)   Wt 139 lb 15.9 oz (63.5 kg)   SpO2 99%   BMI 27.48 kg/m²     CONSTITUTIONAL:  Sedated and mumbling unintelligible words  EYES:  Lids and lashes normal, pupils equal, round and reactive to light, extra ocular muscles intact, sclera clear, conjunctiva normal  ENT:  Normocephalic, without obvious abnormality, atraumatic, sinuses nontender on palpation, external ears without lesions, oral pharynx with moist mucus membranes, tonsils without erythema or exudates, gums normal and good dentition.   NECK:  Supple, symmetrical, trachea midline, no adenopathy, thyroid symmetric, not enlarged and no tenderness, skin normal  HEMATOLOGIC/LYMPHATICS:  no cervical lymphadenopathy  BACK:  Symmetric, no curvature, spinous processes are non-tender on palpation, paraspinous muscles are non-tender on palpation, no costal vertebral tenderness  LUNGS:  No increased work of breathing, good air exchange, clear to auscultation bilaterally, no crackles or wheezing  CARDIOVASCULAR:  Normal apical impulse, regular rate and rhythm, normal S1 and S2, no S3 or S4, and no murmur noted  ABDOMEN:  No scars, normal bowel sounds, soft, non-distended, non-tender, no masses palpated, no hepatosplenomegally  MUSCULOSKELETAL:  Left hip tender  NEUROLOGIC:  Unable to examine because of her mental status  SKIN:  no bruising or bleeding    DATA:  CBC with Differential:    Lab Results   Component Value Date    WBC 10.2 05/13/2019    RBC 3.89 05/13/2019    HGB 11.1 05/13/2019    HCT 36.7 05/13/2019     05/13/2019    MCV 94.3 05/13/2019    MCH 28.5 05/13/2019    MCHC 30.2 05/13/2019    RDW 14.6 05/13/2019    SEGSPCT 82.0 05/13/2019    BANDSPCT 2 02/23/2015    LYMPHOPCT 8.3 05/13/2019    MONOPCT 7.3 05/13/2019    EOSPCT 2.2 12/29/2010    BASOPCT 0.6 05/13/2019    MONOSABS 0.7 05/13/2019    LYMPHSABS 0.9 05/13/2019    EOSABS 0.1 05/13/2019    BASOSABS 0.1 05/13/2019    DIFFTYPE AUTOMATED DIFFERENTIAL 05/13/2019     CMP:    Lab Results   Component Value Date     05/13/2019    K 4.2 05/13/2019     05/13/2019    CO2 25 05/13/2019    BUN 34 05/13/2019    CREATININE 0.9 05/13/2019    GFRAA >60 05/13/2019    LABGLOM 59 05/13/2019    GLUCOSE 106 05/13/2019    PROT 7.1 05/13/2019    PROT 6.8 05/11/2012    LABALBU 4.1 05/13/2019    CALCIUM 9.1 05/13/2019    BILITOT 0.7 05/13/2019    ALKPHOS 87 05/13/2019    AST 23 05/13/2019    ALT 16 05/13/2019     Last 3 Troponin:  No results found for: TROPONINI  U/A:    Lab Results   Component Value Date    COLORU YELLOW 01/25/2016    PROTEINU 10 01/25/2016    WBCUA <1 01/25/2016    RBCUA 2 01/25/2016    MUCUS RARE 01/25/2016    BACTERIA NEGATIVE 01/25/2016    CLARITYU CLEAR 01/25/2016    SPECGRAV 1.021 01/25/2016    LEUKOCYTESUR MODERATE 01/25/2016    UROBILINOGEN 0.2 01/25/2016    BILIRUBINUR NEGATIVE 01/25/2016    BLOODU NEGATIVE 01/25/2016       Radiology:  X ray Left hip:  Left femoral neck fracture  Echo:  Technically difficult examination.   Left ventricular function is normal, EF is estimated at 50%.  Grade I diastolic dysfunction.   Mild concentric left ventricular hypertrophy.   Right ventricular systolic pressure of 45 mm Hg which is suggestive of mild   pulmonary hypertension.   Mild aortic insufficiency with PHT of 416.   Mild tricuspid regurgitation is present. msec.   Mild mitral regurgitation is present.   No evidence of any pericardial effusion. ASSESSMENT AND PLAN:      Principal Problem:    Closed displaced fracture of left femoral neck (HCC)  Active Problems:    CAD (coronary artery disease)    Myelodysplastic disease (HCC)    Dementia, in, senility, with behavioral disturbance    Thyroid disease    Hypertension  Resolved Problems:    * No resolved hospital problems. *    Home medication  Consult Dr. Karen Bonilla for cardiology clearance  Consult Dr. Susie Gregorio for fracture femur.   Haldol for confusion  Avoid Ativan if possible in this elderly lady    [address]  5/14/2019  3:44 PM

## 2019-05-14 NOTE — CONSULTS
[address] [place] [address], [address] [place] [address]                                  CONSULTATION    PATIENT NAME: Jacey West                      :        10/02/1931  MED REC NO:   [id number]                          ROOM:       8120  ACCOUNT NO:   [de-identified]                           ADMIT DATE: 2019  PROVIDER:     Violet Stockton MD    CONSULT DATE:  2019    HISTORY OF PRESENT ILLNESS:  This is an 30-year-old female patient, who  has a history of having cardiomyopathy present. The patient is seeing  Dr. Kristan Nolan in [facility]. The patient had an echo done back in ,  EF of 35%, moderate mitral regurgitation noted. She had a heart  catheterization done on 2014, found to have no significant MR was  noted. No significant coronary artery disease was noted. She was  treated medically. ACE inhibitors caused her to cough, but she has been  tolerant to Cozaar. I think the last MUGA showed EF of around 45%. She  comes in with having a fall at home. No syncopal episode present. She  had fallen and she has a fracture of the left hip present. She was seen  by orthopedics and plan is for surgery today. At this time, she is  stable. She appears in sinus rhythm. MEDICATIONS:  She is on aspirin 81 mg a day, Coreg 25 b.i.d., vitamin  B12, Synthroid, Cozaar 100 mg a day. PAST MEDICAL HISTORY:  History of cardiomyopathy, nonischemic;  hypertension, hyperlipidemia present, and thyroid disease present. PAST SURGICAL HISTORY:  None. SOCIAL HISTORY:  She does not smoke and does not drink. ALLERGIES:  PENICILLIN. PHYSICAL EXAMINATION:  GENERAL:  The patient is able to arouse, but resting. VITAL SIGNS:  She is in sinus rhythm. HEENT:  Head is normocephalic and atraumatic. Pupils equal and reactive  to light. CHEST:  Symmetric expansion. LUNGS:  Clear to auscultation. No wheezing or rhonchi present.   HEART:  Regular rhythm. ABDOMEN:  Soft and nontender. Bowel sounds present. No  hepatosplenomegaly or guarding appreciated. EXTREMITIES:  No cyanosis or clubbing present. NEUROLOGIC:  Cranial nerves II through XII are grossly intact. RADIOLOGIC DATA:  The patient had a heart catheterization done back in  2013 and heart cath showed left main is patent; LAD, circ and RCA has  minimal luminal irregularities noted. LABORATORY DATA:  BUN is 34, creatinine 0.9. LFTs are normal.  CBC is  within normal limits. PT and PTT are therapeutic. EKG showed sinus rhythm present. IMPRESSION:  This is an 42-year-old female patient who had a fall,  mechanical fall. No syncope present, had a history of having  cardiomyopathy present. She now comes with left hip fracture present. From cardiac stand at this time, I think she is stable. Her EF is  around 40-50% range present. I will just keep her on telemetry. Continue medical treatment and we will follow the patient along with  you. Monitor risk for cardiovascular complication and watch for signs  of heart failure.         Latosha Fleming MD    D: 05/14/2019 11:19:12       T: 05/14/2019 12:42:20     [id number]  Job#: [id number]     Doc#: [id number]    CC:

## 2019-05-15 ENCOUNTER — APPOINTMENT (OUTPATIENT)
Dept: GENERAL RADIOLOGY | Age: 84
DRG: 470 | End: 2019-05-15
Payer: COMMERCIAL

## 2019-05-15 LAB
ALBUMIN SERPL-MCNC: 3.7 GM/DL (ref 3.4–5)
ALP BLD-CCNC: 62 IU/L (ref 40–129)
ALT SERPL-CCNC: 14 U/L (ref 10–40)
ANION GAP SERPL CALCULATED.3IONS-SCNC: 10 MMOL/L (ref 4–16)
AST SERPL-CCNC: 28 IU/L (ref 15–37)
BASOPHILS ABSOLUTE: 0 K/CU MM
BASOPHILS RELATIVE PERCENT: 0.1 % (ref 0–1)
BILIRUB SERPL-MCNC: 1 MG/DL (ref 0–1)
BUN BLDV-MCNC: 15 MG/DL (ref 6–23)
CALCIUM SERPL-MCNC: 8.3 MG/DL (ref 8.3–10.6)
CHLORIDE BLD-SCNC: 107 MMOL/L (ref 99–110)
CO2: 23 MMOL/L (ref 21–32)
CREAT SERPL-MCNC: 0.7 MG/DL (ref 0.6–1.1)
DIFFERENTIAL TYPE: ABNORMAL
EOSINOPHILS ABSOLUTE: 0 K/CU MM
EOSINOPHILS RELATIVE PERCENT: 0 % (ref 0–3)
GFR AFRICAN AMERICAN: >60 ML/MIN/1.73M2
GFR NON-AFRICAN AMERICAN: >60 ML/MIN/1.73M2
GLUCOSE BLD-MCNC: 171 MG/DL (ref 70–99)
HCT VFR BLD CALC: 29 % (ref 37–47)
HEMOGLOBIN: ABNORMAL GM/DL (ref 12.5–16)
IMMATURE NEUTROPHIL %: 0.2 % (ref 0–0.43)
LYMPHOCYTES ABSOLUTE: 0.3 K/CU MM
LYMPHOCYTES RELATIVE PERCENT: 3.4 % (ref 24–44)
MAGNESIUM: 2.3 MG/DL (ref 1.8–2.4)
MCH RBC QN AUTO: 28.4 PG (ref 27–31)
MCHC RBC AUTO-ENTMCNC: 30.3 % (ref 32–36)
MCV RBC AUTO: 93.5 FL (ref 78–100)
MONOCYTES ABSOLUTE: 0.4 K/CU MM
MONOCYTES RELATIVE PERCENT: 4.4 % (ref 0–4)
NUCLEATED RBC %: 0 %
PDW BLD-RTO: 14.6 % (ref 11.7–14.9)
PLATELET # BLD: 165 K/CU MM (ref 140–440)
PMV BLD AUTO: 9.8 FL (ref 7.5–11.1)
POTASSIUM SERPL-SCNC: 4.4 MMOL/L (ref 3.5–5.1)
RBC # BLD: 3.1 M/CU MM (ref 4.2–5.4)
SEGMENTED NEUTROPHILS ABSOLUTE COUNT: 8.5 K/CU MM
SEGMENTED NEUTROPHILS RELATIVE PERCENT: 91.9 % (ref 36–66)
SODIUM BLD-SCNC: 140 MMOL/L (ref 135–145)
T4 FREE: 1.24 NG/DL (ref 0.9–1.8)
TOTAL IMMATURE NEUTOROPHIL: 0.02 K/CU MM
TOTAL NUCLEATED RBC: 0 K/CU MM
TOTAL PROTEIN: 5.7 GM/DL (ref 6.4–8.2)
TSH HIGH SENSITIVITY: 1.18 UIU/ML (ref 0.27–4.2)
WBC # BLD: 9.3 K/CU MM (ref 4–10.5)

## 2019-05-15 PROCEDURE — 6370000000 HC RX 637 (ALT 250 FOR IP): Performed by: ORTHOPAEDIC SURGERY

## 2019-05-15 PROCEDURE — 1200000000 HC SEMI PRIVATE

## 2019-05-15 PROCEDURE — 83735 ASSAY OF MAGNESIUM: CPT

## 2019-05-15 PROCEDURE — 6360000002 HC RX W HCPCS: Performed by: INTERNAL MEDICINE

## 2019-05-15 PROCEDURE — 97162 PT EVAL MOD COMPLEX 30 MIN: CPT

## 2019-05-15 PROCEDURE — 80053 COMPREHEN METABOLIC PANEL: CPT

## 2019-05-15 PROCEDURE — 94761 N-INVAS EAR/PLS OXIMETRY MLT: CPT

## 2019-05-15 PROCEDURE — 97530 THERAPEUTIC ACTIVITIES: CPT

## 2019-05-15 PROCEDURE — 84443 ASSAY THYROID STIM HORMONE: CPT

## 2019-05-15 PROCEDURE — 73590 X-RAY EXAM OF LOWER LEG: CPT

## 2019-05-15 PROCEDURE — 36415 COLL VENOUS BLD VENIPUNCTURE: CPT

## 2019-05-15 PROCEDURE — 97535 SELF CARE MNGMENT TRAINING: CPT

## 2019-05-15 PROCEDURE — 97167 OT EVAL HIGH COMPLEX 60 MIN: CPT

## 2019-05-15 PROCEDURE — 72170 X-RAY EXAM OF PELVIS: CPT

## 2019-05-15 PROCEDURE — 2700000000 HC OXYGEN THERAPY PER DAY

## 2019-05-15 PROCEDURE — 2580000003 HC RX 258: Performed by: ORTHOPAEDIC SURGERY

## 2019-05-15 PROCEDURE — 6370000000 HC RX 637 (ALT 250 FOR IP): Performed by: INTERNAL MEDICINE

## 2019-05-15 PROCEDURE — 84439 ASSAY OF FREE THYROXINE: CPT

## 2019-05-15 PROCEDURE — 85025 COMPLETE CBC W/AUTO DIFF WBC: CPT

## 2019-05-15 PROCEDURE — 6360000002 HC RX W HCPCS: Performed by: ORTHOPAEDIC SURGERY

## 2019-05-15 PROCEDURE — 2500000003 HC RX 250 WO HCPCS: Performed by: ORTHOPAEDIC SURGERY

## 2019-05-15 RX ORDER — LORAZEPAM 2 MG/ML
1 INJECTION INTRAMUSCULAR ONCE
Status: COMPLETED | OUTPATIENT
Start: 2019-05-15 | End: 2019-05-15

## 2019-05-15 RX ORDER — LOSARTAN POTASSIUM 25 MG/1
25 TABLET ORAL DAILY
Status: DISCONTINUED | OUTPATIENT
Start: 2019-05-15 | End: 2019-05-16

## 2019-05-15 RX ORDER — ZIPRASIDONE MESYLATE 20 MG/ML
20 INJECTION, POWDER, LYOPHILIZED, FOR SOLUTION INTRAMUSCULAR ONCE
Status: DISCONTINUED | OUTPATIENT
Start: 2019-05-15 | End: 2019-05-18 | Stop reason: HOSPADM

## 2019-05-15 RX ORDER — HALOPERIDOL 5 MG/ML
1 INJECTION INTRAMUSCULAR EVERY 6 HOURS PRN
Status: DISCONTINUED | OUTPATIENT
Start: 2019-05-15 | End: 2019-05-16

## 2019-05-15 RX ORDER — HYDROCODONE BITARTRATE AND ACETAMINOPHEN 5; 325 MG/1; MG/1
1 TABLET ORAL EVERY 6 HOURS PRN
Status: DISCONTINUED | OUTPATIENT
Start: 2019-05-15 | End: 2019-05-18 | Stop reason: HOSPADM

## 2019-05-15 RX ORDER — ZIPRASIDONE HYDROCHLORIDE 20 MG/1
20 CAPSULE ORAL 2 TIMES DAILY WITH MEALS
Status: DISCONTINUED | OUTPATIENT
Start: 2019-05-16 | End: 2019-05-15

## 2019-05-15 RX ORDER — LORAZEPAM 2 MG/ML
1 INJECTION INTRAMUSCULAR EVERY 6 HOURS PRN
Status: DISCONTINUED | OUTPATIENT
Start: 2019-05-15 | End: 2019-05-18 | Stop reason: HOSPADM

## 2019-05-15 RX ORDER — MORPHINE SULFATE 4 MG/ML
2 INJECTION, SOLUTION INTRAMUSCULAR; INTRAVENOUS
Status: DISCONTINUED | OUTPATIENT
Start: 2019-05-15 | End: 2019-05-15

## 2019-05-15 RX ORDER — KETOROLAC TROMETHAMINE 30 MG/ML
15 INJECTION, SOLUTION INTRAMUSCULAR; INTRAVENOUS EVERY 6 HOURS PRN
Status: DISCONTINUED | OUTPATIENT
Start: 2019-05-15 | End: 2019-05-18 | Stop reason: HOSPADM

## 2019-05-15 RX ADMIN — LORAZEPAM 1 MG: 2 INJECTION INTRAMUSCULAR; INTRAVENOUS at 17:56

## 2019-05-15 RX ADMIN — HALOPERIDOL LACTATE 1 MG: 5 INJECTION INTRAMUSCULAR at 22:26

## 2019-05-15 RX ADMIN — LEVOTHYROXINE SODIUM 75 MCG: 75 TABLET ORAL at 06:09

## 2019-05-15 RX ADMIN — DEXTROSE MONOHYDRATE 900 MG: 5 INJECTION, SOLUTION INTRAVENOUS at 09:19

## 2019-05-15 RX ADMIN — DEXTROSE MONOHYDRATE 900 MG: 5 INJECTION, SOLUTION INTRAVENOUS at 01:00

## 2019-05-15 RX ADMIN — HYDROCODONE BITARTRATE AND ACETAMINOPHEN 1 TABLET: 5; 325 TABLET ORAL at 12:15

## 2019-05-15 RX ADMIN — DEXTROSE MONOHYDRATE, SODIUM CHLORIDE, AND POTASSIUM CHLORIDE: 50; 4.5; 1.49 INJECTION, SOLUTION INTRAVENOUS at 04:55

## 2019-05-15 RX ADMIN — ACETAMINOPHEN 650 MG: 325 TABLET ORAL at 00:57

## 2019-05-15 RX ADMIN — LOSARTAN POTASSIUM 25 MG: 25 TABLET ORAL at 09:07

## 2019-05-15 RX ADMIN — DOCUSATE SODIUM 100 MG: 100 CAPSULE, LIQUID FILLED ORAL at 09:07

## 2019-05-15 RX ADMIN — ACETAMINOPHEN 650 MG: 325 TABLET ORAL at 12:19

## 2019-05-15 RX ADMIN — MORPHINE SULFATE 2 MG: 4 INJECTION INTRAVENOUS at 16:19

## 2019-05-15 RX ADMIN — CARVEDILOL 25 MG: 25 TABLET, FILM COATED ORAL at 09:06

## 2019-05-15 RX ADMIN — ENOXAPARIN SODIUM 30 MG: 30 INJECTION SUBCUTANEOUS at 09:07

## 2019-05-15 RX ADMIN — DEXTROSE MONOHYDRATE, SODIUM CHLORIDE, AND POTASSIUM CHLORIDE: 50; 4.5; 1.49 INJECTION, SOLUTION INTRAVENOUS at 16:17

## 2019-05-15 RX ADMIN — HALOPERIDOL LACTATE 1 MG: 5 INJECTION INTRAMUSCULAR at 15:42

## 2019-05-15 RX ADMIN — ACETAMINOPHEN 650 MG: 325 TABLET ORAL at 06:10

## 2019-05-15 ASSESSMENT — PAIN SCALES - GENERAL
PAINLEVEL_OUTOF10: 5
PAINLEVEL_OUTOF10: 10
PAINLEVEL_OUTOF10: 9
PAINLEVEL_OUTOF10: 10
PAINLEVEL_OUTOF10: 5
PAINLEVEL_OUTOF10: 10

## 2019-05-15 ASSESSMENT — PAIN DESCRIPTION - ONSET: ONSET: ON-GOING

## 2019-05-15 ASSESSMENT — PAIN DESCRIPTION - LOCATION
LOCATION: HIP
LOCATION: HIP

## 2019-05-15 ASSESSMENT — PAIN DESCRIPTION - PAIN TYPE
TYPE: SURGICAL PAIN
TYPE: SURGICAL PAIN

## 2019-05-15 ASSESSMENT — PAIN DESCRIPTION - ORIENTATION
ORIENTATION: LEFT
ORIENTATION: LEFT

## 2019-05-15 ASSESSMENT — PAIN - FUNCTIONAL ASSESSMENT: PAIN_FUNCTIONAL_ASSESSMENT: PREVENTS OR INTERFERES WITH MANY ACTIVE NOT PASSIVE ACTIVITIES

## 2019-05-15 ASSESSMENT — PAIN DESCRIPTION - DESCRIPTORS
DESCRIPTORS: DISCOMFORT;SHARP;ACHING
DESCRIPTORS: DISCOMFORT;SHARP;ACHING

## 2019-05-15 ASSESSMENT — PAIN DESCRIPTION - FREQUENCY: FREQUENCY: CONTINUOUS

## 2019-05-15 ASSESSMENT — PAIN DESCRIPTION - PROGRESSION: CLINICAL_PROGRESSION: NOT CHANGED

## 2019-05-15 NOTE — PLAN OF CARE
Problem: Falls - Risk of:  Goal: Will remain free from falls  Description  Will remain free from falls  Outcome: Ongoing  Goal: Absence of physical injury  Description  Absence of physical injury  Outcome: Ongoing     Problem: Risk for Impaired Skin Integrity  Goal: Tissue integrity - skin and mucous membranes  Description  Structural intactness and normal physiological function of skin and  mucous membranes.   Outcome: Ongoing     Problem: Pain:  Goal: Pain level will decrease  Description  Pain level will decrease  Outcome: Ongoing  Goal: Control of acute pain  Description  Control of acute pain  Outcome: Ongoing  Goal: Control of chronic pain  Description  Control of chronic pain  Outcome: Ongoing

## 2019-05-15 NOTE — PROGRESS NOTES
Occupational Therapy   Occupational Therapy Initial Assessment  Date: 5/15/2019   Patient Name: Conor Kraus  MRN: [id number]     : 10/2/1931    Date of Service: 5/15/2019    Discharge Recommendations:  IP Rehab  OT Equipment Recommendations  Other: defer    Assessment   Performance deficits / Impairments: Decreased functional mobility ; Decreased safe awareness;Decreased endurance;Decreased balance;Decreased high-level IADLs;Decreased ADL status; Decreased cognition  Treatment Diagnosis: L hip fracture  Prognosis: Good  Decision Making: High Complexity  Assistance / Modification: Pt is a 79 yo female admitted from home for L hip fracture. Pt at baseline is Independent for ADLs, high level IADLs, functional mobility/transfers. Pt currently presents w/ above deficits and requires increased assistance for functional activities. Pt would benefit from continued acute care OT services w/ discharge to ARU. Patient Education: ot role, discharge rec, proper hand placement for safe functional transfers, WBAT and posterior hip precautions  Barriers to Learning: decreased cognition/increased fear during increased mobility  REQUIRES OT FOLLOW UP: Yes  Activity Tolerance  Activity Tolerance: Treatment limited secondary to agitation;Patient limited by pain  Safety Devices  Safety Devices in place: Yes  Type of devices: All fall risk precautions in place; Chair alarm in place;Gait belt;Left in chair;Nurse notified; Patient at risk for falls;Call light within reach  Restraints  Initially in place: No           Patient Diagnosis(es): The primary encounter diagnosis was Left displaced femoral neck fracture ([address] A diagnosis of Fall, initial encounter was also pertinent to this visit. has a past medical history of Hypertension and Thyroid disease. has a past surgical history that includes HEMIARTHROPLASTY HIP (Left, 2019).     Treatment Diagnosis: L hip fracture      Restrictions  Restrictions/Precautions  Restrictions/Precautions: Fall Risk, General Precautions    Subjective   General  Chart Reviewed: Yes  Patient assessed for rehabilitation services?: Yes  Family / Caregiver Present: Yes  Pain Assessment  Pain Assessment: 0-10  Pain Level: 10  Patient's Stated Pain Goal: 4  Pain Type: Surgical pain  Pain Location: Hip  Pain Orientation: Left  Pain Descriptors: Discomfort; Joseline Saran; Aching  Pain Frequency: Continuous  Pain Onset: On-going  Clinical Progression: Not changed  Functional Pain Assessment: Prevents or interferes with many active not passive activities  Non-Pharmaceutical Pain Intervention(s): Repositioned; Ambulation/Increased Activity     Social/Functional History          Objective   Vision: Within Functional Limits  Hearing: Within functional limits    Orientation  Overall Orientation Status: Impaired  Orientation Level: Disoriented to time;Disoriented to situation;Oriented to person;Oriented to place  Observation/Palpation  Posture: Poor  Observation: pt received supine in bed, agreeable to therapy  Balance  Sitting Balance:  Moderate assistance(sitting EOB and up in [address])  Standing Balance: Maximum assistance  Standing Balance  Time: ~20 seconds x 2  Activity: stand pivot transfer x 2 from EOB to [address] and back  Toilet Transfers  Toilet - Technique: Stand pivot  Equipment Used: Standard bedside commode  Toilet Transfer: 2 Person assistance  ADL  Feeding: Modified independent   Grooming: Contact guard assistance  UE Bathing: Minimal assistance  LE Bathing: Dependent/Total  UE Dressing: Minimal assistance  LE Dressing: Dependent/Total(donning socks supine in bed)  Toileting: Dependent/Total  Tone RUE  RUE Tone: Normotonic  Tone LUE  LUE Tone: Normotonic  Coordination  Movements Are Fluid And Coordinated: Yes     Bed mobility  Supine to Sit: Maximum assistance;2 Person assistance  Sit to Supine: Maximum assistance;2 Person assistance  Scooting: Maximal assistance;2 Person assistance  Transfers  Stand Step Transfers: 2 Person assistance  Sit to stand: 2 Person assistance  Stand to sit: 2 Person assistance     Cognition  Overall Cognitive Status: Exceptions  Following Commands: Follows one step commands with repetition; Follows one step commands with increased time  Attention Span: Attends with cues to redirect  Memory: Decreased recall of biographical Information;Decreased recall of recent events;Decreased long term memory;Decreased recall of precautions;Decreased short term memory  Safety Judgement: Decreased awareness of need for assistance;Decreased awareness of need for safety  Problem Solving: Assistance required to generate solutions;Assistance required to implement solutions;Assistance required to identify errors made;Assistance required to correct errors made;Decreased awareness of errors  Insights: Not aware of deficits  Initiation: Requires cues for some  Sequencing: Requires cues for some  Perception  Overall Perceptual Status: WFL     Sensation  Overall Sensation Status: WFL        LUE AROM (degrees)  LUE AROM : WFL  RUE AROM (degrees)  RUE AROM : WFL  LUE Strength  Gross LUE Strength: WFL  L Hand Grasp: 4+/5  RUE Strength  Gross RUE Strength: WFL  R Hand Grasp: 4+/5                   Plan   Plan  Times per week: 2x+  Times per day: Daily  Current Treatment Recommendations: Balance Training, Endurance Training, Gait Training, Neuromuscular Re-education, Pain Management, Patient/Caregiver Education & Training, Home Management Training, Cognitive/Perceptual Training, Functional Mobility Training, Cognitive Reorientation, Safety Education & Training, Equipment Evaluation, Education, & procurement, Positioning, Self-Care / ADL                        AM-PAC Score        AM-[facility] Inpatient Daily Activity Raw Score: 13  AM-PAC Inpatient ADL T-Scale Score : 32.03  ADL Inpatient CMS 0-100% Score: 63.03  ADL Inpatient CMS G-Code Modifier : CL    Goals  Short term

## 2019-05-15 NOTE — PROGRESS NOTES
Pt became very agitated and combative. Dr Sheri Kincaid was paged advised to give 1mg haldol. Pt receive without any complications but did not help. Perfect serve sent to Dr Marry Glover about patients hip being internally rotated and how pt was agitated and combative. Dr Marry Glover ordered morphine because pt became confused on dilaudid. Pt tolerated the morphine with some relief but the room was filled with family members who would not the pt rest. Nurse advised family that pt needed rest and they continued to stay at bedside and bicker with pt and each other. Pt then begain to through bigems and trigems PVC's. Dr Cory Wilder paged. Continue to monitor pt. Pt still combative at this time. Given ativan that Dr. Sheri Kincaid ordered. Pt resting with eyes closed. Dr Marry Glover spoke with family member on phone about hip and other Issues. X ray order placed and abductor pillow removed.

## 2019-05-15 NOTE — CONSULTS
[facility] PHYSICAL THERAPY EVALUATION  Lucy Meyer, 10/2/1931, 4014/4014-A, 5/15/2019    History  [place]:  The primary encounter diagnosis was Left displaced femoral neck fracture ([address] A diagnosis of Fall, initial encounter was also pertinent to this visit. Patient  has a past medical history of Hypertension and Thyroid disease. Patient  has a past surgical history that includes HEMIARTHROPLASTY HIP (Left, 5/14/2019). Subjective:  Patient states:  Patient wanting to get out of bed to the chair. Pain:  4/10 in L hip. Communication with other providers:  Handoff to RN, co-eval with OT. Restrictions: Fall risk, WBAT LLE, posterior hip precautions. Home Setup/Prior level of function  Patient lives at home with daughter on first floor, second floor is for storage. Patient has a couple steps to enter. Examination of body systems (includes body structures/functions, activity/participation limitations):  · Observation:  Supine in bed upon arrival   · Vision:  [employer] [place]  · Hearing:  [place]  · Cardiopulmonary:  On 2L of O2, none at baseline  · Cognition: impaired, see OT/SLP note for further evaluation. Musculoskeletal  · ROM R/L:  WFL. · Strength R/L:  L: 3-/5, R: 4-/5 in function and endurance. · Neuro:  [facility]      Mobility:  · Supine to sit: Max A x2  · Transfers: Max A/max A x2  · Sitting balance:  Min A/CGA. · Standing balance: Max A.    · Gait: unsafe at this time    [facility] 6 Clicks Inpatient Mobility:  AM-PAC Inpatient Mobility Raw Score : 10    Treatment:  Cues for supine to sit transfer, patient unable to assist with LE, during transfer patient with anxiety, retropulsive in sitting. Educated on WB status and posterior hip precautions. Cues for anterior weight shift and pursed lip breathing, knee block to prevent sliding on edge of bed, max A to reposition at edge of bed.   Once patient calm stated she needed to use commode, max cues for positioning, safety with RW, and anterior weight shift, stood mod A x2, became anxious and returned to sitting for safety. Performed stand pivot trnasfer to commode, max A x2, patient anxious during transfer becoming extrememly retropulsive, max A to maintain seated on commode. Patient not wanting to sit on commode or return to bed, with encouragement and cueing able to transfer patient back to bed, seated EOB x5 min to calm anxiety. Patient calm and stating I need to use the restroom, educated that using commode was not safe at this time d/t experience with BSC. Max A x2 to return to supine. Increased time required d/t patient anxiety and need for repeated cues, and impaired cognition. Safety: patient left in bed with bed alarm, call light within reach, RN notified, gait belt used. Assessment:  Patient is a 79 yo female who presents with fall at home and subsequent L femoral fracture, patient s/p L hemiarthroplasty, 5/14, WBAT and posterior hip precautions. Patient demonstrates impaired mobility and cognition and would benefit from skilled PT services and d/c to ARU. Complexity: Moderate  Prognosis: Good, no significant barriers to participation at this time. Plan Times per week: 6+/week, 1 week,   Discharge Recommendations: IP Rehab  Equipment: TBD    Goals:  Short term goals  Time Frame for Short term goals: 1 week  Short term goal 1: Patient will perform supine to sit with mod A. Short term goal 2: Patient will perform STS with min A and RW maintaining hip precautions. Short term goal 3: Patient will ambulate 10ft with min A and RW maintaining hip precautions. Treatment plan:  Bed mobility, transfers, balance, gait, TA, TX. Recommendations for NURSING mobility: do not mobilize at this time.     Time:   Time in: 1036  Time out: 1114  Timed treatment minutes: 25  Total time: 38    Electronically signed by:    Ruby Hinton PT  5/15/2019, 4:45 PM

## 2019-05-15 NOTE — PROGRESS NOTES
Sabas Carnes (10/2/1931)    Daily Progress Note-  Hank Tam MD                     Today's Date:     5/15/2019          Subjective:      Awake and alert with some mild confusion  Daughter is at the bedside this morning  Pain rated pain is perceived as moderate (4-6 pain scale)  Denies shortness of breath or chest pain. Objective:     Patient Vitals for the past 4 hrs:   BP Temp Temp src Pulse Resp SpO2 Weight   05/15/19 0431 132/61 97.3 °F (36.3 °C) Oral 70 19 99 % 163 lb 1.6 oz (74 kg)     I/O last 3 completed shifts: In: 900 [P.O.:50; I.V.:850]  Out: 1940 [Urine:1580; Drains:160; Blood:200]  DRAIN/TUBE OUTPUT:  Closed/Suction Drain Left; Anterior Thigh Accordion-Output (ml): 130 ml    Physical Exam:   Orientation:  A little confused this morning    Left Lower Extremity    Incision:  dressing in place, clean, dry and intact    Lower Extremity Motor :    Moving lower extremities without difficulty today. Able to dorsiflex and   plantar flex foot/ankle. Lower Extremity Sensory:   Neurovascularly intact to gross sensation and touch in lower extremities. Pulses:    present 2+ bilaterally lower extremities.       LABS   CBC:   Recent Labs     05/13/19  2305 05/15/19  0516   WBC 10.2 9.3   HGB 11.1* 8.8  HGB DECREASE CALLED TO ROBERT NAVARRO RN ON 5/15/19 AT 0605 BY  HGB DECREASE CALLED TO ROBERT NAVARRO RN ON 5/15/19 AT 0605 BY CHEMO JOHNSTON  HGB DECREASE CALLED TO ROBERT NAVARRO RN ON 5/15/19 AT 0605 BY CHEMO PANCHAL CLS  RESULTS READ BACK  *    165     BMP:    Recent Labs     05/13/19  2305      K 4.2      CO2 25   BUN 34*   CREATININE 0.9   GLUCOSE 106*         Medications   Meds:    losartan  25 mg Oral Daily    carvedilol  25 mg Oral BID WC    levothyroxine  75 mcg Oral Daily    sodium chloride flush  10 mL Intravenous 2 times per day    docusate sodium  100 mg Oral BID    clindamycin (CLEOCIN) IV  900 mg Intravenous Q8H    acetaminophen  650 mg Oral 4 times per day    enoxaparin  30 mg Subcutaneous Daily       Assessment and Plan     1. Post operative day # 1 Left Hip Hemiarthroplasty (5/14/19)    1:  Mobilize with Physical therapy   -WBAT  2. Acute blood loss anemia, not a complication   -Hb  (8.8)  3:  Continue Deep venous thrombosis prophylaxis    -Chemoprophylaxis Lovenox   -Mechanical-PHILOMENA hose, -SCD's, ambulation  4:  Continue Pain Control   -wean to PO meds   -Cautious use of narcotics prevent postop delirium  6:  D/C Planning:     -Family does not want SNU, open to ARU.      -Prefer to take patient home, family willing to provide 24/7 Anisa Lopez MD

## 2019-05-15 NOTE — DISCHARGE INSTR - COC
up with Dr. Sheeba Byrd    Physician Certification: I certify the above information and transfer of [facility]  is necessary for the continuing treatment of the diagnosis listed and that she requires [facility] for less 30 days.      Update Admission H&P: No change in H&P    PHYSICIAN SIGNATURE:  Electronically signed by Jonathan Rosa MD on 5/18/19 at 11:15 AM

## 2019-05-15 NOTE — PROGRESS NOTES
Daily Progress Note    I have seen ,spoken to  and examined this patient personally, independently of the Physician assistant . I have reviewed the hospital care given to date and reviewed all pertinent labs and imaging. The plan was developed mutually at the time of the visit with the patient,  PA  and myself. I have spoken with patient, nursing staff and provided written and verbal instructions . The above note has been reviewed and I agree with the assessment, diagnosis, and treatment plan with changes made by me as follows     CARDIOLOGY ATTENDING ADDENDUM    HPI:  I have reviewed the above HPI  And agree with above   Lucy is a 80 y. o.year old who and presents with had concerns including Hip Pain. Chief Complaint   Patient presents with    Hip Pain     Interval history:  Patient is more awake alert today  Remain in sinus with PVC  Rate and BP stable  S/p hip surgery left femoral   Hx of non ischemic CM -recent echo showed improved EF   Keep on coreg for now   ARB dose decreased due to low BP  Anemia watch for now  Supportive care  PVC will watch if significant will add amiodarone     Physical Exam:  General:  Awake alert   Head:normal  Eye:normal  Neck:  No JVD   Chest:  Clear to auscultation, respiration easy  Cardiovascular:  Sinus   Abdomen:   nontender  Extremities:  No edema    Pulses; palpable  Neuro: grossly normal      MEDICAL DECISION MAKING;    I agree with the above plan, which was planned by myself and discussed with PA. Ollie Plasencia Electronically signed by Nieves Barrios MD [facility] on 5/15/2019 at 1:37 PM     Pt. Awake, alert and feeling ok   HR stable, BP stable, NSR  Denies CP, SOB    Lt. Femoral neck fx    S/p hemiarthroplasty    Ortho following    Hx nonischemic CMP    Hx of reduced EF down to 35%    Repeat echo yesterday shows 50%    Was following with Dr. Lali Davalos for it    On coreg and losartan    Stable    Will cont.  To follow    Echo-5/14/19  Summary   Technically difficult auscultation and percussion. Abdomen: Soft, NT, ND, +BS  Extremities: No clubbing, no edema  Vascular:  Equal 2+ peripheral pulses. Lab Data:  CBC:   Recent Labs     05/13/19  2305 05/15/19  0516   WBC 10.2 9.3   HGB 11.1* 8.8  HGB DECREASE CALLED TO ROBERT NAVARRO RN ON 5/15/19 AT 0605 BY  HGB DECREASE CALLED TO ROBERT NAVARRO RN ON 5/15/19 AT 0605 BY CHEMO JOHNSTON  HGB DECREASE CALLED TO ROBERT NAVARRO RN ON 5/15/19 AT 0605 BY CHEMO PANCHAL [facility]  RESULTS READ BACK  *   HCT 36.7* 29.0*   MCV 94.3 93.5    165     BMP:   Recent Labs     05/13/19  2305 05/15/19  0516    140   K 4.2 4.4    107   CO2 25 23   BUN 34* 15   CREATININE 0.9 0.7     LIVER PROFILE:   Recent Labs     05/13/19  2305 05/15/19  0516   AST 23 28   ALT 16 14   BILITOT 0.7 1.0   ALKPHOS 87 62     PT/INR:   Recent Labs     05/13/19 2305   PROTIME 10.6   INR 0.93     APTT:   Recent Labs     05/13/19 2305   APTT 24.0     BNP:  No results for input(s): BNP in the last 72 hours.       Assessment:  Patient Active Problem List    Diagnosis Date Noted    CAD (coronary artery disease) 05/14/2019     Priority: High    Myelodysplastic disease ([facility] [address] 05/14/2019     Priority: High    Dementia, in, senility, with behavioral disturbance 05/14/2019     Priority: High    Closed displaced fracture of left femoral neck ([facility] [address] 05/14/2019    Thyroid disease     Hypertension     Chest pain 12/21/2014       Electronically signed by Von Vickers PA-C on 5/15/2019 at 12:06 PM

## 2019-05-16 LAB
BASOPHILS ABSOLUTE: 0 K/CU MM
BASOPHILS RELATIVE PERCENT: 0.2 % (ref 0–1)
DIFFERENTIAL TYPE: ABNORMAL
EOSINOPHILS ABSOLUTE: 0.2 K/CU MM
EOSINOPHILS RELATIVE PERCENT: 2.1 % (ref 0–3)
HCT VFR BLD CALC: 27.9 % (ref 37–47)
HEMOGLOBIN: 8.7 GM/DL (ref 12.5–16)
IMMATURE NEUTROPHIL %: 0.7 % (ref 0–0.43)
LYMPHOCYTES ABSOLUTE: 1.2 K/CU MM
LYMPHOCYTES RELATIVE PERCENT: 11.8 % (ref 24–44)
MCH RBC QN AUTO: 28.7 PG (ref 27–31)
MCHC RBC AUTO-ENTMCNC: 31.2 % (ref 32–36)
MCV RBC AUTO: 92.1 FL (ref 78–100)
MONOCYTES ABSOLUTE: 1 K/CU MM
MONOCYTES RELATIVE PERCENT: 9.4 % (ref 0–4)
NUCLEATED RBC %: 0 %
PDW BLD-RTO: 14.8 % (ref 11.7–14.9)
PLATELET # BLD: 190 K/CU MM (ref 140–440)
PMV BLD AUTO: 9.9 FL (ref 7.5–11.1)
RBC # BLD: 3.03 M/CU MM (ref 4.2–5.4)
SEGMENTED NEUTROPHILS ABSOLUTE COUNT: 8 K/CU MM
SEGMENTED NEUTROPHILS RELATIVE PERCENT: 75.8 % (ref 36–66)
TOTAL IMMATURE NEUTOROPHIL: 0.07 K/CU MM
TOTAL NUCLEATED RBC: 0 K/CU MM
WBC # BLD: 10.5 K/CU MM (ref 4–10.5)

## 2019-05-16 PROCEDURE — 94761 N-INVAS EAR/PLS OXIMETRY MLT: CPT

## 2019-05-16 PROCEDURE — 6370000000 HC RX 637 (ALT 250 FOR IP): Performed by: ORTHOPAEDIC SURGERY

## 2019-05-16 PROCEDURE — 36415 COLL VENOUS BLD VENIPUNCTURE: CPT

## 2019-05-16 PROCEDURE — 94150 VITAL CAPACITY TEST: CPT

## 2019-05-16 PROCEDURE — 97110 THERAPEUTIC EXERCISES: CPT

## 2019-05-16 PROCEDURE — 1200000000 HC SEMI PRIVATE

## 2019-05-16 PROCEDURE — 97116 GAIT TRAINING THERAPY: CPT

## 2019-05-16 PROCEDURE — 6370000000 HC RX 637 (ALT 250 FOR IP): Performed by: INTERNAL MEDICINE

## 2019-05-16 PROCEDURE — 2500000003 HC RX 250 WO HCPCS: Performed by: INTERNAL MEDICINE

## 2019-05-16 PROCEDURE — 2500000003 HC RX 250 WO HCPCS: Performed by: ORTHOPAEDIC SURGERY

## 2019-05-16 PROCEDURE — 85025 COMPLETE CBC W/AUTO DIFF WBC: CPT

## 2019-05-16 PROCEDURE — 6360000002 HC RX W HCPCS: Performed by: ORTHOPAEDIC SURGERY

## 2019-05-16 RX ORDER — TRAMADOL HYDROCHLORIDE 50 MG/1
50 TABLET ORAL EVERY 4 HOURS PRN
Status: DISCONTINUED | OUTPATIENT
Start: 2019-05-16 | End: 2019-05-18 | Stop reason: HOSPADM

## 2019-05-16 RX ORDER — ZIPRASIDONE MESYLATE 20 MG/ML
20 INJECTION, POWDER, LYOPHILIZED, FOR SOLUTION INTRAMUSCULAR ONCE
Status: DISCONTINUED | OUTPATIENT
Start: 2019-05-16 | End: 2019-05-18 | Stop reason: HOSPADM

## 2019-05-16 RX ORDER — CARVEDILOL 12.5 MG/1
12.5 TABLET ORAL 2 TIMES DAILY WITH MEALS
Status: DISCONTINUED | OUTPATIENT
Start: 2019-05-16 | End: 2019-05-18 | Stop reason: HOSPADM

## 2019-05-16 RX ADMIN — DEXTROSE MONOHYDRATE, SODIUM CHLORIDE, AND POTASSIUM CHLORIDE: 50; 4.5; 1.49 INJECTION, SOLUTION INTRAVENOUS at 19:57

## 2019-05-16 RX ADMIN — ACETAMINOPHEN 650 MG: 325 TABLET ORAL at 23:21

## 2019-05-16 RX ADMIN — KETOROLAC TROMETHAMINE 15 MG: 30 INJECTION, SOLUTION INTRAMUSCULAR; INTRAVENOUS at 07:11

## 2019-05-16 RX ADMIN — LOSARTAN POTASSIUM 25 MG: 25 TABLET ORAL at 09:36

## 2019-05-16 RX ADMIN — ACETAMINOPHEN 650 MG: 325 TABLET ORAL at 12:13

## 2019-05-16 RX ADMIN — ENOXAPARIN SODIUM 30 MG: 30 INJECTION SUBCUTANEOUS at 09:37

## 2019-05-16 RX ADMIN — DOCUSATE SODIUM 100 MG: 100 CAPSULE, LIQUID FILLED ORAL at 09:37

## 2019-05-16 RX ADMIN — DOCUSATE SODIUM 100 MG: 100 CAPSULE, LIQUID FILLED ORAL at 23:20

## 2019-05-16 RX ADMIN — CARVEDILOL 25 MG: 25 TABLET, FILM COATED ORAL at 09:36

## 2019-05-16 RX ADMIN — DEXTROSE MONOHYDRATE, SODIUM CHLORIDE, AND POTASSIUM CHLORIDE: 50; 4.5; 1.49 INJECTION, SOLUTION INTRAVENOUS at 11:19

## 2019-05-16 RX ADMIN — DEXTROSE MONOHYDRATE, SODIUM CHLORIDE, AND POTASSIUM CHLORIDE: 50; 4.5; 1.49 INJECTION, SOLUTION INTRAVENOUS at 01:29

## 2019-05-16 ASSESSMENT — PAIN SCALES - GENERAL
PAINLEVEL_OUTOF10: 0
PAINLEVEL_OUTOF10: 4
PAINLEVEL_OUTOF10: 0

## 2019-05-16 ASSESSMENT — PAIN DESCRIPTION - LOCATION
LOCATION: HIP
LOCATION: HIP

## 2019-05-16 ASSESSMENT — PAIN SCALES - WONG BAKER: WONGBAKER_NUMERICALRESPONSE: 4

## 2019-05-16 ASSESSMENT — PAIN DESCRIPTION - ORIENTATION
ORIENTATION: LEFT
ORIENTATION: LEFT

## 2019-05-16 ASSESSMENT — PAIN DESCRIPTION - DESCRIPTORS: DESCRIPTORS: PATIENT UNABLE TO DESCRIBE

## 2019-05-16 ASSESSMENT — PAIN DESCRIPTION - PAIN TYPE
TYPE: SURGICAL PAIN
TYPE: SURGICAL PAIN

## 2019-05-16 NOTE — PROGRESS NOTES
Patient has been calm and pleasant. Meds taken without any difficulty, no reports of patient trying to climb out of bed or chair on own.  Up to bathroom with walker and one assist.

## 2019-05-16 NOTE — PROGRESS NOTES
Patient's family member requested to talk to Dr Gera Cee about updates and she requested to be called back. Dr. Gera Cee notified and called back but patient's family was not in the room. Dr Gera Cee to call back in 5 minutes. Dr. Gera Cee called back the patient's daughter, Namita Quintanilla, through her phone ([phone]).

## 2019-05-16 NOTE — PROGRESS NOTES
Progress note    Lucy Macias    5/15/2019    Subjective:     Patient has no complaints however nursing staff have noticed that patient because very agitated when family in her room. Medication side effects: none. Scheduled Meds:   losartan  25 mg Oral Daily    ziprasidone  20 mg Intramuscular Once    carvedilol  25 mg Oral BID WC    levothyroxine  75 mcg Oral Daily    sodium chloride flush  10 mL Intravenous 2 times per day    docusate sodium  100 mg Oral BID    acetaminophen  650 mg Oral 4 times per day    enoxaparin  30 mg Subcutaneous Daily     Continuous Infusions:   dextrose 5% and 0.45% NaCl with KCl 20 mEq 100 mL/hr at 05/15/19 1617     PRN Meds:HYDROcodone-acetaminophen, haloperidol lactate, LORazepam, ketorolac, sodium chloride flush, ondansetron    Review of Systems  Pertinent items are noted in HPI. Objective:     Patient Vitals for the past 8 hrs:   BP Temp Temp src Pulse Resp SpO2   05/15/19 2137 (!) 141/61 98.5 °F (36.9 °C) Oral 95 17 96 %   05/15/19 1944 (!) 129/43 98.4 °F (36.9 °C) Oral 87 17 100 %     I/O last 3 completed shifts: In: 48 [P.O.:50]  Out: 1669 [Urine:2350; Drains:220]  No intake/output data recorded. BP (!) 141/61   Pulse 95   Temp 98.5 °F (36.9 °C) (Oral)   Resp 17   Ht 4' 11.84\" (1.52 m)   Wt 163 lb 1.6 oz (74 kg)   SpO2 96%   BMI 32.02 kg/m²   General appearance: alert and cooperative. Lungs: clear to auscultation bilaterally  Heart: regular rate and rhythm, S1, S2 normal, no murmur, click, rub or gallop  Abdomen: soft, non-tender; bowel sounds normal; no masses,  no organomegaly  Extremities: s/p left hip surgery  Skin: Skin color, texture, turgor normal. No rashes or lesions      Labs and imaging reviewed.   CBC with Differential:    Lab Results   Component Value Date    WBC 9.3 05/15/2019    RBC 3.10 05/15/2019    HGB  05/15/2019     8.8  HGB DECREASE CALLED TO ROBERT NAVARRO RN ON 5/15/19 AT 0605 BY  HGB DECREASE CALLED TO ROBERT NAVARRO RN ON 5/15/19 AT 0605 BY CHEMO JOHNSTON  HGB DECREASE CALLED TO ROBERT NAVARRO RN ON 5/15/19 AT 0605 BY CHEMO PANCHAL CLS  RESULTS READ BACK      HCT 29.0 05/15/2019     05/15/2019    MCV 93.5 05/15/2019    MCH 28.4 05/15/2019    MCHC 30.3 05/15/2019    RDW 14.6 05/15/2019    SEGSPCT 91.9 05/15/2019    BANDSPCT 2 02/23/2015    LYMPHOPCT 3.4 05/15/2019    MONOPCT 4.4 05/15/2019    EOSPCT 2.2 12/29/2010    BASOPCT 0.1 05/15/2019    MONOSABS 0.4 05/15/2019    LYMPHSABS 0.3 05/15/2019    EOSABS 0.0 05/15/2019    BASOSABS 0.0 05/15/2019    DIFFTYPE AUTOMATED DIFFERENTIAL 05/15/2019     CMP:    Lab Results   Component Value Date     05/15/2019    K 4.4 05/15/2019     05/15/2019    CO2 23 05/15/2019    BUN 15 05/15/2019    CREATININE 0.7 05/15/2019    GFRAA >60 05/15/2019    LABGLOM >60 05/15/2019    GLUCOSE 171 05/15/2019    PROT 5.7 05/15/2019    PROT 6.8 05/11/2012    LABALBU 3.7 05/15/2019    CALCIUM 8.3 05/15/2019    BILITOT 1.0 05/15/2019    ALKPHOS 62 05/15/2019    AST 28 05/15/2019    ALT 14 05/15/2019     BMP:    Lab Results   Component Value Date     05/15/2019    K 4.4 05/15/2019     05/15/2019    CO2 23 05/15/2019    BUN 15 05/15/2019    LABALBU 3.7 05/15/2019    CREATININE 0.7 05/15/2019    CALCIUM 8.3 05/15/2019    GFRAA >60 05/15/2019    LABGLOM >60 05/15/2019    GLUCOSE 171 05/15/2019     Sodium:    Lab Results   Component Value Date     05/15/2019     Potassium:    Lab Results   Component Value Date    K 4.4 05/15/2019     Calcium:    Lab Results   Component Value Date    CALCIUM 8.3 05/15/2019     Warfarin PT/INR:  No components found for: PTPATWAR, PTINRWAR  PTT:    Lab Results   Component Value Date    APTT 24.0 05/13/2019   [APTT  Last 3 Troponin:  No results found for: TROPONINI  ABG:  No results found for: PHART, EAI5RSW, PO2ART, IRF3VFR, BEART, THGBART, KHW9YQF, Y5ELTHWZ  TSH:  No results found for: TSH  VITAMIN B12: No components found for: B12  FOLATE:    Lab Results Component Value Date    FOLATE 14.8 05/10/2017     IRON:    Lab Results   Component Value Date    IRON 63 05/10/2017     Iron Saturation:  No components found for: PERCENTFE  TIBC:    Lab Results   Component Value Date    TIBC 267 05/10/2017     FERRITIN:    Lab Results   Component Value Date    FERRITIN 168 05/10/2017       Assessment:     Principal Problem:    Closed displaced fracture of left femoral neck (HCC)  Active Problems:    CAD (coronary artery disease)    Myelodysplastic disease (HCC)    Dementia, in, senility, with behavioral disturbance    Thyroid disease    Hypertension  Resolved Problems:    * No resolved hospital problems. *      Plan:   Ativan seems to calm her per nursing  Continue Ativan  Stop Aricept, as family has questions about its use. Patient is not oriented to place, or time. Does not remember my name.   Pain management per surgery      Mindy BATISTA M.D.  5/15/2019

## 2019-05-16 NOTE — PROGRESS NOTES
Physical Therapy  . Physical Therapy Treatment Note  Name: Adeel Hameed MRN: [id number] :   10/2/1931   Date:  2019   Admission Date: 2019 Room:  [address] [place] [address]-A     Restrictions/Precautions:  Restrictions/Precautions  Restrictions/Precautions: Fall Risk, General Precautions         Communication with other providers:  CHRISTIANO Catherine cleared patient for physical therapy. Subjective:  Patient states: \"My knee hurts. \"  Pain:   Location, Type, Intensity (0/10 to 10/10): Initially denied pain. Patient then c/o pain in left hip and knee with mobility. Did not rate. Objective:    Observation:  Patient semi fowlers upon therapist arrival. Tele. IV. Kathi. Sitter present. A&O n/a      Treatment, including education/measures:  Transfers  Supine to sit :minimal assistance with bed rail  Sit to supine :NT  Scooting :SBA for patient to scoot to EOB  Rolling :NT  Sit to stand :CGA from bed; minimal assistance from chair  Stand to sit :minimal assistance for safety and proper transfer  SPT: CGA for SPT from bed to chair. Patient required verbal instructions for proper transfer technique, sequencing and safety awareness. Therapeutic Exercise:  Therapeutic exercises were instructed today. Instructions were given for technique, safety, recruitment, and rationale. Instructions were verbal and/or tactile. Seated: Ankle pumps 1 sets of 10  Long arc quads 1 sets of 10    Supine: Ankle pumps 1 sets of 10  Heel slides 1 sets of 10  Hip abduction / adduction 1 sets of 10  Short arc quads 1 sets of 10  Quad sets 1 sets of 10 with 5 second hold    Gait:  Patient ambulated with RW for 50 ft with CGA x1 and chair/IV follow . Patient presents with step to gait, antalgic gait and L LE IR (from knee). Patient presents with decreased bilateral step length and height as well as decreased gait speed. Patient required verbal instructions for sequencing, keeping toes forward and safety awareness.       Safety  Patient left safely in the chair, with call light/phone in reach with alarm applied. Sitter present. Gait belt was used for transfers and gait. Assessment / Impression:    Patient's tolerance of treatment:  well   Adverse Reaction: none  Significant change in status and impact:  none  Barriers to improvement:  Confusion   Discharge plan: SNF    Plan for Next Session:    Per POC    Time in:  0920  Time out:  1000  Timed treatment minutes:  40  Total treatment time:  40    Previously filed items:     Short term goals  Time Frame for Short term goals: 1 week  Short term goal 1: Patient will perform supine to sit with mod A. Short term goal 2: Patient will perform STS with min A and RW maintaining hip precautions. Short term goal 3: Patient will ambulate 10ft with min A and RW maintaining hip precautions.         Electronically signed by:    [facility] [id number]

## 2019-05-16 NOTE — PROGRESS NOTES
1945: Patient very agitated 10 family members in and out of the room. They requested to speak with Nursing Supervisor. Gissel Nursing Supervisor came to talk with family and provided education and a sitter for the patient. Family refuses to have abductor pillow placed between patients legs. Dr. Marga Santacruz notified, he states that is ok at this time. Dr. Cameron Wright and Dr. Marga Santacruz notified of family concerns as morphine dilaudid and oxycodone have made the patient more confused and agitated they have been discontinued. Patient also spitting out oral medications and somewhat combative therefore IV Geodon and IV Toradol have been prescribed to keep the patient comfortable. 2240: Family has left the bedside for the evening and will be back tomorrow to visit. All questions and concerns by family were addressed. Patient resting comfortably with sitter at bedside.

## 2019-05-16 NOTE — PROGRESS NOTES
Mel Christensen (10/2/1931)    Daily Progress Note-  Michelle Casas MD                     Today's Date:     5/16/2019          Subjective:      Agitated last night and combative, pulled hemovac  Spoke with family last night  Spoke with Maria Guadalupejasmin Garcia (night nurse) gave one dose of pain meds last night  Sitter at bedside this morning states she did not sleep much last night  Sleeping comfortable this morning  X-rays ordered of hip and leg-no acute process or dislocation      Objective:     Patient Vitals for the past 4 hrs:   BP Temp Temp src Pulse Resp SpO2 Weight   05/16/19 0518 (!) 121/40 -- -- -- -- -- --   05/16/19 0509 (!) 136/41 97.5 °F (36.4 °C) Axillary 85 16 94 % 150 lb 3.2 oz (68.1 kg)     I/O last 3 completed shifts: In: 48 [P.O.:50]  Out: 6887 [Urine:2350; Drains:220]  DRAIN/TUBE OUTPUT:  Closed/Suction Drain Left; Anterior Thigh Accordion-Output (ml): 90 ml    Physical Exam:     Left Lower Extremity    Incision:  dressing in place, clean, dry and intact    Lower Extremity :     Leg internally rotated, pain with movement   Good distal pulse          LABS   CBC:   Recent Labs     05/13/19  2305 05/15/19  0516   WBC 10.2 9.3   HGB 11.1* 8.8  HGB DECREASE CALLED TO ROBERT NAVARRO RN ON 5/15/19 AT 0605 BY  HGB DECREASE CALLED TO ROBERT NAVARRO RN ON 5/15/19 AT 0605 BY CHEMO JOHNSTON  HGB DECREASE CALLED TO ROBERT NAVARRO RN ON 5/15/19 AT 0605 BY CHEMO PANCHAL CLS  RESULTS READ BACK  *    165     BMP:    Recent Labs     05/13/19  2305 05/15/19  0516    140   K 4.2 4.4    107   CO2 25 23   BUN 34* 15   CREATININE 0.9 0.7   GLUCOSE 106* 171*         Medications   Meds:    losartan  25 mg Oral Daily    ziprasidone  20 mg Intramuscular Once    carvedilol  25 mg Oral BID WC    levothyroxine  75 mcg Oral Daily    sodium chloride flush  10 mL Intravenous 2 times per day    docusate sodium  100 mg Oral BID    acetaminophen  650 mg Oral 4 times per day    enoxaparin  30 mg Subcutaneous Daily            EXAMINATION:   ONE XRAY VIEW OF THE PELVIS       5/15/2019 7:04 pm       COMPARISON:   None.       HISTORY:   ORDERING SYSTEM PROVIDED HISTORY: hip dislocation   TECHNOLOGIST PROVIDED HISTORY:   Reason for exam:->hip dislocation   Ordering Physician Provided Reason for Exam: hip dislocation       FINDINGS:   Postoperative left hip hemiarthroplasty.  Components appear intact and well   aligned.  Overlying skin staples.           Impression   Postoperative left hip hemiarthroplasty.                   Assessment and Plan     1. POD # 2 Left Hip Hemiarthroplasty (5/14/19)    1:  Mobilize with Physical therapy   -WBAT   -Posterior hip precautions   -Hip internally rotated-likely position of comfort   -X-rays show no dislocation   -Adbuctor pillow removed as may be contributing to agitation  2. Acute blood loss anemia, not a complication   -Hb  (pending this ma, 8.8 yesterday)  3:  Continue Deep venous thrombosis prophylaxis    -Chemoprophylaxis Lovenox   -Mechanical-PHILOMENA hose, -SCD's, ambulation  4:  Continue Pain Control   -added Toradol    -Will add Ultram  5. Post-op delirium   -Ativan PRN agitation   -Geodon available   -Cautious use of narcotics seems to cause agitation  6:  D/C Planning:     -Family does not want SNU, open to ARU.      -Prefer to take patient home, family willing to provide 24/7 Lester Oliva MD

## 2019-05-16 NOTE — PROGRESS NOTES
Progress note    Lucy Moore    5/16/2019    Subjective:     Patient has no complaints however nursing staff has noticed that patient because very agitated when family in her room. Medication side effects: none. Scheduled Meds:   [Held by provider] carvedilol  12.5 mg Oral BID WC    ziprasidone  20 mg Intramuscular Once    ziprasidone  20 mg Intramuscular Once    levothyroxine  75 mcg Oral Daily    sodium chloride flush  10 mL Intravenous 2 times per day    docusate sodium  100 mg Oral BID    acetaminophen  650 mg Oral 4 times per day    enoxaparin  30 mg Subcutaneous Daily     Continuous Infusions:   dextrose 5% and 0.45% NaCl with KCl 20 mEq 125 mL/hr at 05/16/19 1214     PRN Meds:traMADol, HYDROcodone-acetaminophen, LORazepam, ketorolac, sodium chloride flush, ondansetron    Review of Systems  Pertinent items are noted in HPI. Objective:     Patient Vitals for the past 8 hrs:   BP Temp Temp src Pulse Resp SpO2 Weight   05/16/19 1112 (!) 89/40 -- -- -- -- -- --   05/16/19 1015 (!) 103/40 97.8 °F (36.6 °C) Oral 96 24 100 % --   05/16/19 0518 (!) 121/40 -- -- -- -- -- --   05/16/19 0509 (!) 136/41 97.5 °F (36.4 °C) Axillary 85 16 94 % 150 lb 3.2 oz (68.1 kg)     I/O last 3 completed shifts: In: 1100 [I.V.:1100]  Out: 2740 [Urine:2650; Drains:90]  I/O this shift:  In: 100 [P.O.:100]  Out: -     BP (!) 89/40 Comment: notified Sherwin Medico RN   Pulse 96   Temp 97.8 °F (36.6 °C) (Oral)   Resp 24   Ht 4' 11.84\" (1.52 m)   Wt 150 lb 3.2 oz (68.1 kg)   SpO2 100%   BMI 29.49 kg/m²   General appearance: alert and cooperative. Lungs: clear to auscultation bilaterally  Heart: regular rate and rhythm, S1, S2 normal, no murmur, click, rub or gallop  Abdomen: soft, non-tender; bowel sounds normal; no masses,  no organomegaly  Extremities: s/p left hip surgery  Skin: Skin color, texture, turgor normal. No rashes or lesions      Labs and imaging reviewed.   CBC with Differential:    Lab Results   Component Value Lab Results   Component Value Date    IRON 63 05/10/2017     Iron Saturation:  No components found for: PERCENTFE  TIBC:    Lab Results   Component Value Date    TIBC 267 05/10/2017     FERRITIN:    Lab Results   Component Value Date    FERRITIN 168 05/10/2017       Assessment:     Principal Problem:    Closed displaced fracture of left femoral neck (HCC)  Active Problems:    CAD (coronary artery disease)    Myelodysplastic disease (HCC)    Dementia, in, senility, with behavioral disturbance    Thyroid disease    Hypertension  Resolved Problems:    * No resolved hospital problems. *    Apparently patient calm when family left  She is offall painmeds except for Tramadol  Spoke to daughter, she wants her off Aricept for now.   Continue PT/OT per surgery      Charlotte BATISTA M.D.  5/16/2019

## 2019-05-16 NOTE — PROGRESS NOTES
KCl 20 mEq 100 mL/hr at 05/16/19 1119      PRN Meds:  traMADol, HYDROcodone-acetaminophen, haloperidol lactate, LORazepam, ketorolac, sodium chloride flush, ondansetron       Physical Exam:  Vitals:    05/16/19 1112   BP: (!) 89/40   Pulse:    Resp:    Temp:    SpO2:         General: patient is awake confused  Chest: Nontender  Cardiac: sinus  Lungs:Clear to auscultation and percussion. Abdomen: Soft, NT, ND, +BS  Extremities: no edema   Vascular:  Equal 2+ peripheral pulses. Lab Data:  CBC:   Recent Labs     05/13/19  2305 05/15/19  0516 05/16/19  0537   WBC 10.2 9.3 10.5   HGB 11.1* 8.8  HGB DECREASE CALLED TO ROBERT NAVARRO RN ON 5/15/19 AT 0605 BY  HGB DECREASE CALLED TO ROBERT NAVARRO RN ON 5/15/19 AT 0605 BY CHEMO JOHNSTON  HGB DECREASE CALLED TO ROBERT NAVARRO RN ON 5/15/19 AT 0605 BY CHEMO PANCHAL CLS  RESULTS READ BACK  * 8.7*   HCT 36.7* 29.0* 27.9*   MCV 94.3 93.5 92.1    165 190     BMP:   Recent Labs     05/13/19  2305 05/15/19  0516    140   K 4.2 4.4    107   CO2 25 23   BUN 34* 15   CREATININE 0.9 0.7     LIVER PROFILE:   Recent Labs     05/13/19  2305 05/15/19  0516   AST 23 28   ALT 16 14   BILITOT 0.7 1.0   ALKPHOS 87 62     PT/INR:   Recent Labs     05/13/19  2305   PROTIME 10.6   INR 0.93     APTT:   Recent Labs     05/13/19  2305   APTT 24.0     BNP:  No results for input(s): BNP in the last 72 hours.       Assessment:  Patient Active Problem List    Diagnosis Date Noted    CAD (coronary artery disease) 05/14/2019     Priority: High    Myelodysplastic disease ([facility] [address] 05/14/2019     Priority: High    Dementia, in, senility, with behavioral disturbance 05/14/2019     Priority: High    Closed displaced fracture of left femoral neck ([facility] [address] 05/14/2019    Thyroid disease     Hypertension     Chest pain 12/21/2014       Maria R Regalado MD 5/16/2019 11:31 AM

## 2019-05-16 NOTE — PLAN OF CARE
Problem: Falls - Risk of:  Goal: Will remain free from falls  Description  Will remain free from falls  Outcome: Ongoing  Goal: Absence of physical injury  Description  Absence of physical injury  Outcome: Ongoing     Problem: Risk for Impaired Skin Integrity  Goal: Tissue integrity - skin and mucous membranes  Description  Structural intactness and normal physiological function of skin and  mucous membranes.   Outcome: Ongoing     Problem: Pain:  Goal: Pain level will decrease  Description  Pain level will decrease  Outcome: Ongoing  Goal: Control of acute pain  Description  Control of acute pain  Outcome: Ongoing  Goal: Control of chronic pain  Description  Control of chronic pain  Outcome: Ongoing     Problem: Discharge Planning:  Goal: Discharged to appropriate level of care  Description  Discharged to appropriate level of care  Outcome: Ongoing     Problem: Infection - Surgical Site:  Goal: Will show no infection signs and symptoms  Description  Will show no infection signs and symptoms  Outcome: Ongoing     Problem: Injury - Risk of, Postfracture Complications:  Goal: Absence of fat embolism  Description  Absence of fat embolism  Outcome: Ongoing  Goal: Absence of compartment syndrome signs and symptoms  Description  Absence of compartment syndrome signs and symptoms  Outcome: Ongoing     Problem: Mobility - Impaired:  Goal: Mobility will improve to maximum level  Description  Mobility will improve to maximum level  Outcome: Ongoing     Problem: Venous Thromboembolism:  Goal: Absence of deep vein thrombosis  Description  Absence of deep vein thrombosis  Outcome: Ongoing  Goal: Absence of signs or symptoms of impaired coagulation  Description  Absence of signs or symptoms of impaired coagulation  Outcome: Ongoing  Goal: Will show no signs or symptoms of venous thromboembolism  Description  Will show no signs or symptoms of venous thromboembolism  Outcome: Ongoing

## 2019-05-17 LAB
BASOPHILS ABSOLUTE: 0 K/CU MM
BASOPHILS RELATIVE PERCENT: 0.5 % (ref 0–1)
DIFFERENTIAL TYPE: ABNORMAL
EOSINOPHILS ABSOLUTE: 0.7 K/CU MM
EOSINOPHILS RELATIVE PERCENT: 8.9 % (ref 0–3)
HCT VFR BLD CALC: 32.4 % (ref 37–47)
HEMOGLOBIN: 9.9 GM/DL (ref 12.5–16)
IMMATURE NEUTROPHIL %: 0.6 % (ref 0–0.43)
LYMPHOCYTES ABSOLUTE: 1.2 K/CU MM
LYMPHOCYTES RELATIVE PERCENT: 14.8 % (ref 24–44)
MCH RBC QN AUTO: 28.4 PG (ref 27–31)
MCHC RBC AUTO-ENTMCNC: 30.6 % (ref 32–36)
MCV RBC AUTO: 92.8 FL (ref 78–100)
MONOCYTES ABSOLUTE: 1 K/CU MM
MONOCYTES RELATIVE PERCENT: 12 % (ref 0–4)
NUCLEATED RBC %: 0 %
PDW BLD-RTO: 15.1 % (ref 11.7–14.9)
PLATELET # BLD: 223 K/CU MM (ref 140–440)
PMV BLD AUTO: 9.7 FL (ref 7.5–11.1)
RBC # BLD: 3.49 M/CU MM (ref 4.2–5.4)
SEGMENTED NEUTROPHILS ABSOLUTE COUNT: 5.3 K/CU MM
SEGMENTED NEUTROPHILS RELATIVE PERCENT: 63.2 % (ref 36–66)
TOTAL IMMATURE NEUTOROPHIL: 0.05 K/CU MM
TOTAL NUCLEATED RBC: 0 K/CU MM
WBC # BLD: 8.3 K/CU MM (ref 4–10.5)

## 2019-05-17 PROCEDURE — 36415 COLL VENOUS BLD VENIPUNCTURE: CPT

## 2019-05-17 PROCEDURE — 94761 N-INVAS EAR/PLS OXIMETRY MLT: CPT

## 2019-05-17 PROCEDURE — 94150 VITAL CAPACITY TEST: CPT

## 2019-05-17 PROCEDURE — 97110 THERAPEUTIC EXERCISES: CPT

## 2019-05-17 PROCEDURE — 97116 GAIT TRAINING THERAPY: CPT

## 2019-05-17 PROCEDURE — 6370000000 HC RX 637 (ALT 250 FOR IP): Performed by: INTERNAL MEDICINE

## 2019-05-17 PROCEDURE — 85025 COMPLETE CBC W/AUTO DIFF WBC: CPT

## 2019-05-17 PROCEDURE — 2500000003 HC RX 250 WO HCPCS: Performed by: INTERNAL MEDICINE

## 2019-05-17 PROCEDURE — 6370000000 HC RX 637 (ALT 250 FOR IP): Performed by: ORTHOPAEDIC SURGERY

## 2019-05-17 PROCEDURE — 99221 1ST HOSP IP/OBS SF/LOW 40: CPT | Performed by: NURSE PRACTITIONER

## 2019-05-17 PROCEDURE — 6360000002 HC RX W HCPCS: Performed by: ORTHOPAEDIC SURGERY

## 2019-05-17 PROCEDURE — 97530 THERAPEUTIC ACTIVITIES: CPT

## 2019-05-17 PROCEDURE — 1200000000 HC SEMI PRIVATE

## 2019-05-17 RX ORDER — AMIODARONE HYDROCHLORIDE 200 MG/1
200 TABLET ORAL 2 TIMES DAILY
Status: DISCONTINUED | OUTPATIENT
Start: 2019-05-17 | End: 2019-05-18 | Stop reason: HOSPADM

## 2019-05-17 RX ADMIN — KETOROLAC TROMETHAMINE 15 MG: 30 INJECTION, SOLUTION INTRAMUSCULAR; INTRAVENOUS at 23:44

## 2019-05-17 RX ADMIN — DOCUSATE SODIUM 100 MG: 100 CAPSULE, LIQUID FILLED ORAL at 22:33

## 2019-05-17 RX ADMIN — ACETAMINOPHEN 650 MG: 325 TABLET ORAL at 14:09

## 2019-05-17 RX ADMIN — ACETAMINOPHEN 650 MG: 325 TABLET ORAL at 05:47

## 2019-05-17 RX ADMIN — DEXTROSE MONOHYDRATE, SODIUM CHLORIDE, AND POTASSIUM CHLORIDE: 50; 4.5; 1.49 INJECTION, SOLUTION INTRAVENOUS at 03:54

## 2019-05-17 RX ADMIN — DOCUSATE SODIUM 100 MG: 100 CAPSULE, LIQUID FILLED ORAL at 11:03

## 2019-05-17 RX ADMIN — ACETAMINOPHEN 650 MG: 325 TABLET ORAL at 22:33

## 2019-05-17 RX ADMIN — DEXTROSE MONOHYDRATE, SODIUM CHLORIDE, AND POTASSIUM CHLORIDE: 50; 4.5; 1.49 INJECTION, SOLUTION INTRAVENOUS at 13:05

## 2019-05-17 RX ADMIN — ENOXAPARIN SODIUM 30 MG: 30 INJECTION SUBCUTANEOUS at 11:03

## 2019-05-17 RX ADMIN — LEVOTHYROXINE SODIUM 75 MCG: 75 TABLET ORAL at 05:47

## 2019-05-17 RX ADMIN — AMIODARONE HYDROCHLORIDE 200 MG: 200 TABLET ORAL at 20:00

## 2019-05-17 ASSESSMENT — PAIN SCALES - GENERAL
PAINLEVEL_OUTOF10: 3
PAINLEVEL_OUTOF10: 0
PAINLEVEL_OUTOF10: 6
PAINLEVEL_OUTOF10: 0

## 2019-05-17 ASSESSMENT — PAIN DESCRIPTION - FREQUENCY: FREQUENCY: INTERMITTENT

## 2019-05-17 ASSESSMENT — PAIN DESCRIPTION - PAIN TYPE: TYPE: SURGICAL PAIN

## 2019-05-17 ASSESSMENT — PAIN DESCRIPTION - DESCRIPTORS: DESCRIPTORS: CONSTANT;ACHING;DISCOMFORT

## 2019-05-17 ASSESSMENT — PAIN DESCRIPTION - ORIENTATION: ORIENTATION: LEFT

## 2019-05-17 ASSESSMENT — PAIN DESCRIPTION - PROGRESSION: CLINICAL_PROGRESSION: GRADUALLY WORSENING

## 2019-05-17 ASSESSMENT — PAIN DESCRIPTION - LOCATION: LOCATION: HIP

## 2019-05-17 ASSESSMENT — PAIN DESCRIPTION - ONSET: ONSET: ON-GOING

## 2019-05-17 NOTE — PROGRESS NOTES
Physical Therapy    Physical Therapy Treatment Note  Name: Danny Jose MRN: [id number] :   10/2/1931   Date:  2019   Admission Date: 2019 Room:  [address] [place] [address]-A   Restrictions/Precautions:  Restrictions/Precautions  Restrictions/Precautions: Fall Risk, General Precautions       Posterior Hip Precautions L LE    Communication with other providers: okay to do tx per CHRISTIANO DUMAS Subjective:  Patient states:  \"I am done peeing. \" Pt's daughter stated she slept in the recliner all night. Pain:   Location, Type, Intensity (0/10 to 10/10):  5/10 L hip  Objective:    Observation:  On toilet with RN assist at arrival. Agreeable to tx. Treatment, including education/measures:    Therapeutic Activity:  Zainab sit<>stand from various surfaces and heights x 2 vc/tc proper technique, sequencing, WS, precautions and safety to improve functional mobility  CGA SPT vc/tc proper technique, sequencing, WS, WB, , precautions and safety to improve functional mobility  SBA sitting balance vc/tc proper technique, posture, WS and safety to improve functional mobility  Comments: Pt demonstrated lack of safety awareness and MaxA vc for posterior precautions. Pt unable to recall educated precautions. Gait:  CGA amb w RW  ~ 20 ft + 20 ft + 5 ft vc/tc proper technique, sequencing, posture, Step H/L, bassam, deviation from path, precautions and safety to improve functional mobility  Comments: Pt demonstrated slow bassam, short H/L steps, fwd flexed posture, fwd head, IR L LE, increased B UE fatigue. Pt demonstrated required 3 rest breaks, lack of safety awareness and MaxA vc for posterior precautions. Pt unable to recall educated precautions. Exercises:  Education:  Pt was instructed on Purpose of Exercise Program, Casandra Scale and Signs and Symptoms of Exercise Intolerance  Pt's family member educated on pt's posterior precautions. Total Hip Exercises:  Supine:   Ankle Pumps x 10  Quad Sets x 10  Gluteal Sets x 10  Heel Slides x 10  Short Arc Quads x 10  Hip Abduction x 10    Therapeutic Exercise:  Therapeutic exercises were instructed today. Cues were given for technique, safety, recruitment, and rationale. Cues were verbal and/or tactile. Safety  Patient left safely in the [address] in recliner with daughter in room at departure, with call light/phone in reach with alarm applied. Gait belt was used for transfers and gait. Assessment / Impression:       Patient's tolerance of treatment:  good   Adverse Reaction: none  Significant change in status and impact:  none  Barriers to improvement: strength, balance, precautions, and safety     Plan for Next Session:    Cont POC. Rec SNF  Time in:  0845  Time out:  0938  Timed treatment minutes:  53  Total treatment time:  48    Previously filed items:     Short term goals  Time Frame for Short term goals: 1 week  Short term goal 1: Patient will perform supine to sit with mod A. Short term goal 2: Patient will perform STS with min A and RW maintaining hip precautions. Short term goal 3: Patient will ambulate 10ft with min A and RW maintaining hip precautions.         Electronically signed by:    Alba Pope PTA  5/17/2019, 9:31 AM

## 2019-05-17 NOTE — CONSULTS
Inpatient consult to Psychiatry  Consult performed by: CELI Burden - CNP  Consult ordered by: Harley Ren MD        Initial Psychiatric History and Physical    [facility]  [id number]  5/13/2019 05/17/19    ID: Patient is a 80 yrs y.o. female    CC: \"I fell and broke my leg. \"    HPI: Patient presented to the hospital after falling and tripping at home. She landed on her left side fracturing her left femur. She subsequently had a left hip hemiarthroplasty. After surgery, she became agitated requiring Ativan for calm. She was only oriented to self and remains the same today. She denies SI/HI and AV hallucinations. She denies depression and anxiety. Two of her daughters are at the bedside and state that the narcotics that were given for pain seemed to cause the patient to become agitated. PMH: CAD, HTN, Hypothyroidism, age related cognitive decline,  Myelodysplastic syndrome    Past Psychiatric History: none    Family Psychiatric History:   History reviewed. No pertinent family history. Allergies:   Allergies   Allergen Reactions    Ibuprofen Other (See Comments)     Unknown     Pcn [Penicillins]         OBJECTIVE  Vital Signs:  Vitals:    05/17/19 0959   BP: (!) 108/44   Pulse: (!) 42   Resp: 23   Temp: 97.8 °F (36.6 °C)   SpO2: 98%       Labs:  Recent Results (from the past 48 hour(s))   CBC auto differential    Collection Time: 05/16/19  5:37 AM   Result Value Ref Range    WBC 10.5 4.0 - 10.5 K/CU MM    RBC 3.03 (L) 4.2 - 5.4 M/CU MM    Hemoglobin 8.7 (L) 12.5 - 16.0 GM/DL    Hematocrit 27.9 (L) 37 - 47 %    MCV 92.1 78 - 100 FL    MCH 28.7 27 - 31 PG    MCHC 31.2 (L) 32.0 - 36.0 %    RDW 14.8 11.7 - 14.9 %    Platelets 370 730 - 917 K/CU MM    MPV 9.9 7.5 - 11.1 FL    Differential Type AUTOMATED DIFFERENTIAL     Segs Relative 75.8 (H) 36 - 66 %    Lymphocytes % 11.8 (L) 24 - 44 %    Monocytes % 9.4 (H) 0 - 4 %    Eosinophils % 2.1 0 - 3 %    Basophils % 0.2 0 - 1 %    Segs Absolute 8.0 K/CU MM Lymphocytes # 1.2 K/CU MM    Monocytes # 1.0 K/CU MM    Eosinophils # 0.2 K/CU MM    Basophils # 0.0 K/CU MM    Nucleated RBC % 0.0 %    Total Nucleated RBC 0.0 K/CU MM    Total Immature Neutrophil 0.07 K/CU MM    Immature Neutrophil % 0.7 (H) 0 - 0.43 %   CBC auto differential    Collection Time: 05/17/19  4:53 AM   Result Value Ref Range    WBC 8.3 4.0 - 10.5 K/CU MM    RBC 3.49 (L) 4.2 - 5.4 M/CU MM    Hemoglobin 9.9 (L) 12.5 - 16.0 GM/DL    Hematocrit 32.4 (L) 37 - 47 %    MCV 92.8 78 - 100 FL    MCH 28.4 27 - 31 PG    MCHC 30.6 (L) 32.0 - 36.0 %    RDW 15.1 (H) 11.7 - 14.9 %    Platelets 004 037 - 530 K/CU MM    MPV 9.7 7.5 - 11.1 FL    Differential Type AUTOMATED DIFFERENTIAL     Segs Relative 63.2 36 - 66 %    Lymphocytes % 14.8 (L) 24 - 44 %    Monocytes % 12.0 (H) 0 - 4 %    Eosinophils % 8.9 (H) 0 - 3 %    Basophils % 0.5 0 - 1 %    Segs Absolute 5.3 K/CU MM    Lymphocytes # 1.2 K/CU MM    Monocytes # 1.0 K/CU MM    Eosinophils # 0.7 K/CU MM    Basophils # 0.0 K/CU MM    Nucleated RBC % 0.0 %    Total Nucleated RBC 0.0 K/CU MM    Total Immature Neutrophil 0.05 K/CU MM    Immature Neutrophil % 0.6 (H) 0 - 0.43 %       Review of Systems:  Reports of no current cardiovascular, respiratory, gastrointestinal, genitourinary, integumentary, neurological, muscuoskeletal, or immunological symptoms today. PSYCHIATRIC: See HPI above.     PSYCHIATRIC EXAMINATION / MENTAL STATUS EXAM    CONSTITUTIONAL:    Vitals:   Vitals:    05/17/19 0959   BP: (!) 108/44   Pulse: (!) 42   Resp: 23   Temp: 97.8 °F (36.6 °C)   SpO2: 98%      General appearance: [x] appears age, []  appears older than stated age,               [x]  adequately dressed and groomed, [] disheveled,               [x]  in no acute distress, [] appears mildly distressed, [] other           MUSCULOSKELETAL:   Gait:   [] normal, [] antalgic, [] unsteady, [x] gait not evaluated   Station:             [] erect, [x] sitting, [] recumbent, [] other Strength/tone:  [x] muscle strength and tone appear consistent with age and                                        condition     [] atrophy      [] abnormal movements  PSYCHIATRIC:    Relatedness:  [x] cooperative, [] guarded, [] indifferent, [] hostile,      [] sedated  Speech:  [x] normal prosody, [] pressured, [] decreased volume,    [] increased volume [] slurred [] slowed, [] delayed     [] echolalia, [] incoherent, [] stuttering   Eye contact:  [x] direct, [] fleeting , [] intense []  none  Kinetics:  [x] normal, [] increased, [] decreased  Mood:   [x] stable, [] depressed, [] anxious, [] irritable,     [] labile  [] euphoric   Affect:   [x] normal range, [] constricted, [] depressed , [] anxious,  [] angry, []  blunted     [] mood incongruent, [] blunted  [] restricted   Hallucinations:  [x] denies, [] auditory,  [] visual,  [] olfactory, [] tactile  Delusions:  [x] none, [] grandiose,  [] paranoid,  [] persecutory,  [] somatic,     [] bizarre  [] Christianity/spiritual    Preoccupations:   [x] none, [] violence, [] obsessions, [] other     Suicidal ideation  [x] denies, [] endorses  Homicidal ideation [x] denies, [] endorses  Thought process: [x] logical , [] circumstantial, [] tangential, [] CHELSEA,     [] simplistic, [] disorganized  [] FOI  [] concrete  [] nonsensical    Thought Content: [x] future oriented [] goal directed  [] self-harm, [] guilt,     [] hopelessness  [] obsessive  [] superficial  [] preoccupation    Insight:   [] adequate , [x] limited , [] impaired    Judgment:  [] adequate , [x] limited  [] impaired  Associations:              [x]  Logical and coherent , [] loosening, [] disorganized   Attention and concentration:     [x] intact [] limited [] impaired , [] grossly impaired  Orientation:  [x] person     [] disoriented to: place, time and    situation    Memory:             [x] superficially intact, [] impaired         Impression:   Post operative delirium superimposed on

## 2019-05-17 NOTE — PROGRESS NOTES
Progress note    Lucy Araujo    5/17/2019    Subjective:     Patient has no new complaints  Medication side effects: none. Scheduled Meds:   amiodarone  200 mg Oral BID    [Held by provider] carvedilol  12.5 mg Oral BID WC    ziprasidone  20 mg Intramuscular Once    ziprasidone  20 mg Intramuscular Once    levothyroxine  75 mcg Oral Daily    sodium chloride flush  10 mL Intravenous 2 times per day    docusate sodium  100 mg Oral BID    acetaminophen  650 mg Oral 4 times per day    enoxaparin  30 mg Subcutaneous Daily     Continuous Infusions:   dextrose 5% and 0.45% NaCl with KCl 20 mEq 75 mL/hr at 05/17/19 1305     PRN Meds:traMADol, HYDROcodone-acetaminophen, LORazepam, ketorolac, sodium chloride flush, ondansetron    Review of Systems  Pertinent items are noted in HPI. Objective:     Patient Vitals for the past 8 hrs:   BP Temp Temp src Pulse Resp SpO2   05/17/19 1718 (!) 124/49 97.9 °F (36.6 °C) Oral 92 24 97 %   05/17/19 1145 (!) 115/46 98 °F (36.7 °C) -- 89 20 --     I/O last 3 completed shifts:  In: -   Out: 2150 [Urine:2150]  I/O this shift:  In: 240 [P.O.:240]  Out: -     BP (!) 124/49   Pulse 92   Temp 97.9 °F (36.6 °C) (Oral)   Resp 24   Ht 4' 11.84\" (1.52 m)   Wt 150 lb 3.2 oz (68.1 kg)   SpO2 97%   BMI 29.49 kg/m²   General appearance: alert and cooperative. Lungs: clear to auscultation bilaterally  Heart: regular rate and rhythm, S1, S2 normal, no murmur, click, rub or gallop  Abdomen: soft, non-tender; bowel sounds normal; no masses,  no organomegaly  Extremities: s/p left hip surgery  Skin: Skin color, texture, turgor normal. No rashes or lesions      Labs and imaging reviewed.   CBC with Differential:    Lab Results   Component Value Date    WBC 8.3 05/17/2019    RBC 3.49 05/17/2019    HGB 9.9 05/17/2019    HCT 32.4 05/17/2019     05/17/2019    MCV 92.8 05/17/2019    MCH 28.4 05/17/2019    MCHC 30.6 05/17/2019    RDW 15.1 05/17/2019    [facility] [place]2 05/17/2019 BANDSPCT 2 02/23/2015    LYMPHOPCT 14.8 05/17/2019    MONOPCT 12.0 05/17/2019    EOSPCT 2.2 12/29/2010    BASOPCT 0.5 05/17/2019    MONOSABS 1.0 05/17/2019    LYMPHSABS 1.2 05/17/2019    EOSABS 0.7 05/17/2019    BASOSABS 0.0 05/17/2019    DIFFTYPE AUTOMATED DIFFERENTIAL 05/17/2019     CMP:    Lab Results   Component Value Date     05/15/2019    K 4.4 05/15/2019     05/15/2019    CO2 23 05/15/2019    BUN 15 05/15/2019    CREATININE 0.7 05/15/2019    GFRAA >60 05/15/2019    LABGLOM >60 05/15/2019    GLUCOSE 171 05/15/2019    PROT 5.7 05/15/2019    PROT 6.8 05/11/2012    LABALBU 3.7 05/15/2019    CALCIUM 8.3 05/15/2019    BILITOT 1.0 05/15/2019    ALKPHOS 62 05/15/2019    AST 28 05/15/2019    ALT 14 05/15/2019     BMP:    Lab Results   Component Value Date     05/15/2019    K 4.4 05/15/2019     05/15/2019    CO2 23 05/15/2019    BUN 15 05/15/2019    LABALBU 3.7 05/15/2019    CREATININE 0.7 05/15/2019    CALCIUM 8.3 05/15/2019    GFRAA >60 05/15/2019    LABGLOM >60 05/15/2019    GLUCOSE 171 05/15/2019     Sodium:    Lab Results   Component Value Date     05/15/2019     Potassium:    Lab Results   Component Value Date    K 4.4 05/15/2019     Calcium:    Lab Results   Component Value Date    CALCIUM 8.3 05/15/2019     Warfarin PT/INR:  No components found for: PTPATWAR, PTINRWAR  PTT:    Lab Results   Component Value Date    APTT 24.0 05/13/2019   [APTT  Last 3 Troponin:  No results found for: TROPONINI  ABG:  No results found for: PHART, HBS5EWE, PO2ART, DRH7GUL, BEART, THGBART, GLZ4KNK, M6XUZISS  TSH:  No results found for: TSH  VITAMIN B12: No components found for: B12  FOLATE:    Lab Results   Component Value Date    FOLATE 14.8 05/10/2017     IRON:    Lab Results   Component Value Date    IRON 63 05/10/2017     Iron Saturation:  No components found for: PERCENTFE  TIBC:    Lab Results   Component Value Date    TIBC 267 05/10/2017     FERRITIN:    Lab Results   Component Value Date FERRITIN 168 05/10/2017       Assessment:     Principal Problem:    Closed displaced fracture of left femoral neck (HCC)  Active Problems:    CAD (coronary artery disease)    Myelodysplastic disease (HCC)    Dementia, in, senility, with behavioral disturbance    Thyroid disease    Hypertension  Resolved Problems:    * No resolved hospital problems.  *    Noted cardiology and ortho notes  Spoke with , patient can go to SNF  Nursing tells me she cannot be discharged today because she has a sitter  Will discharge her tomorrow to SNF  Spoke to patient and daughter      Mali Mindy BATISTA M.D.  5/17/2019

## 2019-05-17 NOTE — PROGRESS NOTES
Daily Progress Note     patient is more awake alert this am  Sitting in chair  No chest pain, no dyspnea  Will decrease her IVF to 75 cc?hr  Keep her on coreg for now  S/p hip surgery   Will add amiodarone for PVC BID for 7 days then once a day  Overall doing better    Hx of non ischemic CM with now improved EF  BP is low will stop ARB and lower dose of coreg  Supportive care  S/p hip surgery         Echo-5/14/19--Summary   Technically difficult examination.   Left ventricular function is normal, EF is estimated at 50%.    Grade I diastolic dysfunction.   Mild concentric left ventricular hypertrophy.   Right ventricular systolic pressure of 45 mm Hg which is suggestive of mild   pulmonary hypertension.   Mild aortic insufficiency with PHT of 416.   Mild tricuspid regurgitation is present. msec.   Mild mitral regurgitation is present.   No evidence of any pericardial effusion.      MEDICATIONS: Rere Martínez is on aspirin 81 mg a day, Coreg 25 b.i.d., vitamin  B12, Synthroid, Cozaar 100 mg a day.     PAST MEDICAL HISTORY:  History of cardiomyopathy, nonischemic;  hypertension, hyperlipidemia present, and thyroid disease present.     PAST SURGICAL HISTORY:  None.     SOCIAL HISTORY:  She does not smoke and does not drink.     ALLERGIES:  PENICILLIN      Objective:   BP (!) 135/54   Pulse (!) 45   Temp 97.9 °F (36.6 °C) (Oral)   Resp 19   Ht 4' 11.84\" (1.52 m)   Wt 150 lb 3.2 oz (68.1 kg)   SpO2 100%   BMI 29.49 kg/m²       Intake/Output Summary (Last 24 hours) at 5/17/2019 0956  Last data filed at 5/17/2019 0636  Gross per 24 hour   Intake 60 ml   Output 2600 ml   Net -2540 ml       Medications:   Scheduled Meds:   [Held by provider] carvedilol  12.5 mg Oral BID WC    ziprasidone  20 mg Intramuscular Once    ziprasidone  20 mg Intramuscular Once    levothyroxine  75 mcg Oral Daily    sodium chloride flush  10 mL Intravenous 2 times per day    docusate sodium  100 mg Oral BID    acetaminophen  650 mg Oral 4 times per day    enoxaparin  30 mg Subcutaneous Daily      Infusions:   dextrose 5% and 0.45% NaCl with KCl 20 mEq 125 mL/hr at 05/17/19 0354      PRN Meds:  traMADol, HYDROcodone-acetaminophen, LORazepam, ketorolac, sodium chloride flush, ondansetron       Physical Exam:  Vitals:    05/17/19 0454   BP: (!) 135/54   Pulse: (!) 45   Resp: 19   Temp: 97.9 °F (36.6 °C)   SpO2: 100%        General: awake alert   Chest: Nontender  Cardiac: sinus   Lungs:Clear to auscultation and percussion. Abdomen: Soft, NT, ND, +BS  Extremities: no edema   Vascular:  Equal 2+ peripheral pulses. Lab Data:  CBC:   Recent Labs     05/15/19  0516 05/16/19  0537 05/17/19  0453   WBC 9.3 10.5 8.3   HGB 8.8  HGB DECREASE CALLED TO ROBERT NAVARRO RN ON 5/15/19 AT 0605 BY  HGB DECREASE CALLED TO ROBERT NAVARRO RN ON 5/15/19 AT 0605 BY CHEMO JOHNSTON  HGB DECREASE CALLED TO ROBERT NAVARRO RN ON 5/15/19 AT 0605 BY CHEMO PANCHAL [facility]  RESULTS READ BACK  * 8.7* 9.9*   HCT 29.0* 27.9* 32.4*   MCV 93.5 92.1 92.8    190 223     BMP:   Recent Labs     05/15/19  0516      K 4.4      CO2 23   BUN 15   CREATININE 0.7     LIVER PROFILE:   Recent Labs     05/15/19  0516   AST 28   ALT 14   BILITOT 1.0   ALKPHOS 62     PT/INR: No results for input(s): PROTIME, INR in the last 72 hours. APTT: No results for input(s): APTT in the last 72 hours. BNP:  No results for input(s): BNP in the last 72 hours.       Assessment:  Patient Active Problem List    Diagnosis Date Noted    CAD (coronary artery disease) 05/14/2019     Priority: High    Myelodysplastic disease ([facility] 05/14/2019     Priority: High    Dementia, in, senility, with behavioral disturbance 05/14/2019     Priority: High    [facility] 05/14/2019    Thyroid disease     Hypertension     Chest pain 12/21/2014       Sanaz Davis MD 5/17/2019 9:56 AM

## 2019-05-17 NOTE — PROGRESS NOTES
Ilya Ball (10/2/1931)    Daily Progress Note-  Warner Mckeon MD                     Today's Date:     5/17/2019          Subjective:      Awake, alert and pleasant this morning  Minimal pain in hip at rest  Ambulated to the bathroom a few times last night  Sitter in the room and states had an uneventful night      Objective:     Patient Vitals for the past 4 hrs:   BP Temp Temp src Pulse Resp SpO2   05/17/19 0454 (!) 135/54 97.9 °F (36.6 °C) Oral (!) 45 19 100 %     I/O last 3 completed shifts:   In: 160 [P.O.:160]  Out: 2600 [Urine:2600]      Physical Exam:     Left Lower Extremity    Incision:  dressing in place, clean, dry and intact    Lower Extremity :     Leg internally rotated, pain with movement   Good distal pulse          LABS   CBC:   Recent Labs     05/15/19  0516 05/16/19  0537 05/17/19  0453   WBC 9.3 10.5 8.3   HGB 8.8  HGB DECREASE CALLED TO ROBERT NAVARRO RN ON 5/15/19 AT 0605 BY  HGB DECREASE CALLED TO ROBERT NAVARRO RN ON 5/15/19 AT 0605 BY CHEMO JOHNSTON  HGB DECREASE CALLED TO ROBERT NAVARRO RN ON 5/15/19 AT 0605 BY CHEMO PANCHAL CLS  RESULTS READ BACK  * 8.7* 9.9*    190 223     BMP:    Recent Labs     05/15/19  0516      K 4.4      CO2 23   BUN 15   CREATININE 0.7   GLUCOSE 171*         Medications   Meds:    [Held by provider] carvedilol  12.5 mg Oral BID     ziprasidone  20 mg Intramuscular Once    ziprasidone  20 mg Intramuscular Once    levothyroxine  75 mcg Oral Daily    sodium chloride flush  10 mL Intravenous 2 times per day    docusate sodium  100 mg Oral BID    acetaminophen  650 mg Oral 4 times per day    enoxaparin  30 mg Subcutaneous Daily            EXAMINATION:   ONE XRAY VIEW OF THE PELVIS       5/15/2019 7:04 pm       COMPARISON:   None.       HISTORY:   ORDERING SYSTEM PROVIDED HISTORY: hip

## 2019-05-18 ENCOUNTER — HOSPITAL ENCOUNTER (EMERGENCY)
Age: 84
Discharge: HOME | End: 2019-05-18
Payer: COMMERCIAL

## 2019-05-18 VITALS
DIASTOLIC BLOOD PRESSURE: 48 MMHG | TEMPERATURE: 97.9 F | HEART RATE: 79 BPM | OXYGEN SATURATION: 93 % | SYSTOLIC BLOOD PRESSURE: 137 MMHG | BODY MASS INDEX: 29.49 KG/M2 | HEIGHT: 60 IN | WEIGHT: 150.2 LBS | RESPIRATION RATE: 19 BRPM

## 2019-05-18 VITALS
WEIGHT: 150.2 LBS | DIASTOLIC BLOOD PRESSURE: 70 MMHG | BODY MASS INDEX: 29.49 KG/M2 | RESPIRATION RATE: 14 BRPM | SYSTOLIC BLOOD PRESSURE: 152 MMHG | TEMPERATURE: 98.7 F | HEART RATE: 95 BPM | OXYGEN SATURATION: 97 %

## 2019-05-18 DIAGNOSIS — Z96.642 HISTORY OF LEFT HIP REPLACEMENT: Primary | ICD-10-CM

## 2019-05-18 PROCEDURE — 6360000002 HC RX W HCPCS: Performed by: INTERNAL MEDICINE

## 2019-05-18 PROCEDURE — 97530 THERAPEUTIC ACTIVITIES: CPT

## 2019-05-18 PROCEDURE — 97116 GAIT TRAINING THERAPY: CPT

## 2019-05-18 PROCEDURE — 6370000000 HC RX 637 (ALT 250 FOR IP): Performed by: ORTHOPAEDIC SURGERY

## 2019-05-18 PROCEDURE — 2500000003 HC RX 250 WO HCPCS: Performed by: INTERNAL MEDICINE

## 2019-05-18 PROCEDURE — 6370000000 HC RX 637 (ALT 250 FOR IP): Performed by: INTERNAL MEDICINE

## 2019-05-18 PROCEDURE — 97110 THERAPEUTIC EXERCISES: CPT

## 2019-05-18 PROCEDURE — 6360000002 HC RX W HCPCS: Performed by: ORTHOPAEDIC SURGERY

## 2019-05-18 PROCEDURE — 99284 EMERGENCY DEPT VISIT MOD MDM: CPT

## 2019-05-18 RX ORDER — AMIODARONE HYDROCHLORIDE 200 MG/1
200 TABLET ORAL 2 TIMES DAILY
Qty: 30 TABLET | Refills: 0
Start: 2019-05-18 | End: 2019-05-18

## 2019-05-18 RX ORDER — PSEUDOEPHEDRINE HCL 30 MG
100 TABLET ORAL 2 TIMES DAILY
Qty: 30 CAPSULE | Refills: 0 | Status: SHIPPED | OUTPATIENT
Start: 2019-05-18

## 2019-05-18 RX ORDER — AMIODARONE HYDROCHLORIDE 200 MG/1
200 TABLET ORAL DAILY
Qty: 30 TABLET | Refills: 0 | Status: SHIPPED | OUTPATIENT
Start: 2019-05-18 | End: 2020-07-23 | Stop reason: SDUPTHER

## 2019-05-18 RX ADMIN — DEXTROSE MONOHYDRATE, SODIUM CHLORIDE, AND POTASSIUM CHLORIDE: 50; 4.5; 1.49 INJECTION, SOLUTION INTRAVENOUS at 00:52

## 2019-05-18 RX ADMIN — LEVOTHYROXINE SODIUM 75 MCG: 75 TABLET ORAL at 05:09

## 2019-05-18 RX ADMIN — AMIODARONE HYDROCHLORIDE 200 MG: 200 TABLET ORAL at 08:04

## 2019-05-18 RX ADMIN — DOCUSATE SODIUM 100 MG: 100 CAPSULE, LIQUID FILLED ORAL at 08:04

## 2019-05-18 RX ADMIN — TRAMADOL HYDROCHLORIDE 50 MG: 50 TABLET, FILM COATED ORAL at 06:12

## 2019-05-18 RX ADMIN — ACETAMINOPHEN 650 MG: 325 TABLET ORAL at 08:04

## 2019-05-18 RX ADMIN — LORAZEPAM 1 MG: 2 INJECTION INTRAMUSCULAR; INTRAVENOUS at 00:48

## 2019-05-18 RX ADMIN — ENOXAPARIN SODIUM 30 MG: 30 INJECTION SUBCUTANEOUS at 08:04

## 2019-05-18 RX ADMIN — ACETAMINOPHEN 650 MG: 325 TABLET ORAL at 01:33

## 2019-05-18 ASSESSMENT — PAIN SCALES - GENERAL
PAINLEVEL_OUTOF10: 0
PAINLEVEL_OUTOF10: 6
PAINLEVEL_OUTOF10: 6
PAINLEVEL_OUTOF10: 0
PAINLEVEL_OUTOF10: 0
PAINLEVEL_OUTOF10: 3

## 2019-05-18 ASSESSMENT — PAIN SCALES - WONG BAKER: WONGBAKER_NUMERICALRESPONSE: 6

## 2019-05-18 NOTE — PROGRESS NOTES
Physical Therapy Treatment Note  Name: Franci English MRN: [id number] :   10/2/1931   Date:  2019   Admission Date: 2019 Room:  [facility]   Restrictions/Precautions:  Restrictions/Precautions  Restrictions/Precautions: Fall Risk, General Precautions     iv, telemetry  Communication with other providers:  [id number] Faviola Edwards to see by nursing  Subjective:  Patient states:  Pt says that she has no pain and she would like to get up and move. Pain:   Location, Type, Intensity (0/10 to 10/10):  0/10  Objective:    Observation:  Pt sitting up in chair visiting with family upon arrival.    Treatment, including education/measures:  Sit <-> stand with CGA and cues for foot placement  Ambulation into hallway and back ~ 30 ft with CGA using RW and assist for IV pole and telemetry. Pt exhibits antalgic gait with short step length, decreased foot clearance and decreased WB on LLE. Therapist instructed patient in keeping Left hip and knee in neutral.  Pt performed sitting BLE exercises with cues for posture and technique including: marches, LAQs, HR, TR, and glut sets all 10x each. Patient left in chair with family present and call light within reach. Assessment / Impression:  Pt tolerated tx well. Pt fatigued easily with ambulation. No pain. Patient's tolerance of treatment:  well   Adverse Reaction: none  Significant change in status and impact:  none  Barriers to improvement:  none  Plan for Next Session:    Pt to be D/C to [facility] today. Time in:  1335  Time out:  1415  Timed treatment minutes:  40  Total treatment time:  40    Previously filed items:     Short term goals  Time Frame for Short term goals: 1 week  Short term goal 1: Patient will perform supine to sit with mod A. Short term goal 2: Patient will perform STS with min A and RW maintaining hip precautions. Short term goal 3: Patient will ambulate 10ft with min A and RW maintaining hip precautions.         Electronically signed by:    Shannon Brooks FRANCISCO J MEDINA  5/18/2019, 2:30 PM

## 2019-05-18 NOTE — FLOWSHEET NOTE
05/17/19 3108   Mobility   Activity Ambulate in room; To Bathroom; Return to bed   Level of Assistance Moderate assist, patient does 50-74%   Assistive Device Front wheel walker   Distance Ambulated (ft) 20 ft   Ambulation Response Tolerated well

## 2019-05-18 NOTE — DISCHARGE SUMMARY
Physician Discharge Summary     Patient ID:  Stephie Mcneil  [id number]  43 y.o.  10/2/1931    Admit date: 5/13/2019    Discharge date and time: 5/18/2019  3:26 PM     Admitting Physician: Arcelia De La Garza MD     Discharge Physician: Joel Ross      Admission Diagnoses: Left displaced femoral neck fracture ([address] Margean Councilman  Fall, initial encounter [W19. XXXA]  Closed displaced fracture of left femoral neck ([address] [S72.002A]  Closed displaced fracture of left femoral neck ([address] [S72.002A]    Discharge Diagnoses: Fall, initial encounter [W19. XXXA]  Closed displaced fracture of left femoral neck ([address] [S72.002A]  Closed displaced fracture of left femoral neck (HCC) [S72.002A]  Senile dementia  CAD  Myelodysplastic disease  HTN  Primary hypothyroidism        Hospital Course: Patient admitted after a fall for management of her fracture neck of Femur. Dr. Manuel Barroso and Dr. Renetta Hernandez were consulted. She underwent left hip hemiarthoplasty. She was confused during hospital stay which was off and on and was correlated with her family being present which apparently increased her confusion. She was given Geodon, Haldol and Ativan at various times to control her confusion. Aricept was discontinued per family request.  Cardiology placed her on Coreg and Amidarone for PVC's. ARB was stopped. Was discharged to SNF for PT and OT in stable condition.     Discharged Condition: fair    Consults: cardiology and orthopedic surgery    Significant Diagnostic Studies: radiology: X-Ray: Fracture femur neck left hip    Treatments: cardiac meds: amiodarone and Coreg and surgery: Left hemiarthroplasty    Disposition: SNF    Patient Instructions:   Current Discharge Medication List      START taking these medications    Details   amiodarone (CORDARONE) 200 MG tablet Take 1 tablet by mouth 2 times daily  Qty: 30 tablet, Refills: 0      docusate sodium (COLACE, DULCOLAX) 100 MG CAPS Take 100 mg by mouth 2 times daily  Qty: 30 capsule, Refills:

## 2019-05-18 NOTE — FLOWSHEET NOTE
05/17/19 2052   Mobility   Activity Up to chair; Ambulate in room; To Bathroom; Return to chair   Level of Assistance Moderate assist, patient does 50-74%   Assistive Device Front wheel walker   Distance Ambulated (ft) 20 ft

## 2019-05-18 NOTE — CARE COORDINATION
CM contacted [facility], who reported pt is APPROVED for admission. CM updated RN Susy, who has already sent a PS to Dr. Ron iLsa requesting dc orders - as family prefers pt to be transferred earlier in the day. Dc packet in soft chart with ph/fx numbers.   Electronically signed by GUANAKO Gutiérrez on 5/18/2019 at 10:00 AM
Reviewed chart and attempted to speak with pt/family about discharge plan. When entering room pt is very confused and became increasing more agitated. CM spoke daughter who wanted ARU , explained that ARU not option d/t insurance. Gave her a PPO SNU list to review but she already wanted [facility]. Spoke with Kenzie Howell at [facility] they are not in network with insurance. Family will need to pick another facility.
Reviewed chart and sitter was removed from room this am.   Spoke with pt/daughter, pt not sure she wants to go to a SNU. Pt's daughter encouraged pt to go for 7-10 days,  She is going to speak with her brother then let CM know. CM will continue to follow. Spoke with [address] [place] [address] Avenue will be ready for pt tomorrow if she remains sitter free.
Spoke with pt's daughter this AM and they are interested in [facility]. VM left and faxed info to [facility]. She returned my call and states they will be able to take pt once she is medically stable and sitter is out of room for 24 hrs.
Patient reports that she stays on the first floor and does not have to use the steps. Patient has a walker, w/c and a shower chair. Patient's children take turns staying with patient 24/7. Patient has a PCP. Patient's daughter provides transportation to appointments. Patient has no issue with the cost of her medications. Discussed DME/  HHC. Patient denies any DME needs. Reports that she had a family member who received [facility] from an agency which she believes was [facility]. Patient's daughter is going to confirm agency and update CM. Patient plans to return home with family support with [facility] at discharge. Denies any questions, equipment or resource needs. Agreeable to continued Care Transition. Confirmed CTC contact information. Encouraged call back if needs arise. T.C to PCP. Hospital follow up 5/17 @ 2:30. No future appointments. Health Maintenance  There are no preventive care reminders to display for this patient.     Mario Buenrostro RN

## 2019-05-18 NOTE — PROGRESS NOTES
Called [employer] for pt transportation to [place]. [facility] requires Elva Stan [phone] to contact Winter Bowman who advised would call.  at 1500 informed family.

## 2019-05-18 NOTE — PROGRESS NOTES
Daily Progress Note    patient is awake alert feeling better  No chest pain no dyspnea  Remain in sinus with PVC   Stable to d/c to ECF   Keep on coreg 25mg bid and amiodarone 200mg daily  F/u in office in two weeks  Hold ARB as BP is low and resume as outpatient if stable  S/p hip surgery       Echo-5/14/19--Summary   Technically difficult examination.   Left ventricular function is normal, EF is estimated at 50%.    Grade I diastolic dysfunction.   Mild concentric left ventricular hypertrophy.   Right ventricular systolic pressure of 45 mm Hg which is suggestive of mild   pulmonary hypertension.   Mild aortic insufficiency with PHT of 416.   Mild tricuspid regurgitation is present. msec.   Mild mitral regurgitation is present.   No evidence of any pericardial effusion.      MEDICATIONS: Deb Otero is on aspirin 81 mg a day, Coreg 25 b.i.d., vitamin  B12, Synthroid, Cozaar 100 mg a day.     PAST MEDICAL HISTORY:  History of cardiomyopathy, nonischemic;  hypertension, hyperlipidemia present, and thyroid disease present.     PAST SURGICAL HISTORY:  None.     SOCIAL HISTORY:  She does not smoke and does not drink.     ALLERGIES:  PENICILLIN        Objective:   BP (!) 137/48   Pulse 79   Temp 97.9 °F (36.6 °C) (Oral)   Resp 19   Ht 4' 11.84\" (1.52 m)   Wt 150 lb 3.2 oz (68.1 kg)   SpO2 93%   BMI 29.49 kg/m²       Intake/Output Summary (Last 24 hours) at 5/18/2019 1219  Last data filed at 5/18/2019 0753  Gross per 24 hour   Intake 720 ml   Output 125 ml   Net 595 ml       Medications:   Scheduled Meds:   amiodarone  200 mg Oral BID    [Held by provider] carvedilol  12.5 mg Oral BID     ziprasidone  20 mg Intramuscular Once    ziprasidone  20 mg Intramuscular Once    levothyroxine  75 mcg Oral Daily    sodium chloride flush  10 mL Intravenous 2 times per day    docusate sodium  100 mg Oral BID    acetaminophen  650 mg Oral 4 times per day    enoxaparin  30 mg Subcutaneous Daily      Infusions:     PRN Meds:  traMADol, HYDROcodone-acetaminophen, LORazepam, ketorolac, sodium chloride flush, ondansetron       Physical Exam:  Vitals:    05/18/19 1027   BP: (!) 137/48   Pulse: 79   Resp: 19   Temp: 97.9 °F (36.6 °C)   SpO2: 93%        General: awake alert no chest pain   Chest: Nontender  Cardiac: sinus   Lungs:Clear to auscultation and percussion. Abdomen: Soft, NT, ND, +BS  Extremities: no edema   Vascular:  Equal 2+ peripheral pulses. Lab Data:  CBC:   Recent Labs     05/16/19  0537 05/17/19  0453   WBC 10.5 8.3   HGB 8.7* 9.9*   HCT 27.9* 32.4*   MCV 92.1 92.8    223     BMP: No results for input(s): NA, K, CL, CO2, PHOS, BUN, CREATININE in the last 72 hours. Invalid input(s): CA  LIVER PROFILE: No results for input(s): AST, ALT, LIPASE, BILIDIR, BILITOT, ALKPHOS in the last 72 hours. Invalid input(s): AMYLASE,  ALB  PT/INR: No results for input(s): PROTIME, INR in the last 72 hours. APTT: No results for input(s): APTT in the last 72 hours. BNP:  No results for input(s): BNP in the last 72 hours.       Assessment:  Patient Active Problem List    Diagnosis Date Noted    CAD (coronary artery disease) 05/14/2019     Priority: High    Myelodysplastic disease ([facility] 05/14/2019     Priority: High    Dementia, in, senility, with behavioral disturbance 05/14/2019     Priority: High    Closed displaced fracture of left femoral neck ([facility] 05/14/2019    Thyroid disease     Hypertension     Chest pain 12/21/2014       Nieves Barrios MD 5/18/2019 12:19 PM

## 2019-05-19 NOTE — ED PROVIDER NOTES
Emergency [address] EMERGENCY DEPARTMENT    Patient: Carli Martínez  MRN: [id number]  : 10/2/1931  Date of Evaluation: 2019  ED Provider: Tee Mathew PA-C    Chief Complaint       Chief Complaint   Patient presents with    Altered Mental Status       Cheril Bloch is a 80 y.o. female who presents to the emergency department for agitation. Patient sent in from [place]. She was discharged from the hospital earlier today following a fall and hip fracture that was repaired by Dr. uAre Roberts. She was discharged to [place] for rehab      ROS     CONSTITUTIONAL:  Denies fever. EYES:  Denies visual changes. HEAD:  Denies headache. ENT:  Denies earache, nasal congestion, sore throat. NECK:  Denies neck pain. RESPIRATORY:  Denies any shortness of breath. CARDIOVASCULAR:  Denies chest pain. GI:  Denies nausea or vomiting. :  Denies urinary symptoms. MUSCULOSKELETAL:  Denies extremity pain or swelling. BACK:  Denies back pain. INTEGUMENT:  Denies skin changes. LYMPHATIC:  Denies lymphadenopathy. NEUROLOGIC:  Denies any numbness/tingling. PSYCHIATRIC:  Denies SI/HI. Past History     Past Medical History:   Diagnosis Date    Hypertension     Thyroid disease      Past Surgical History:   Procedure Laterality Date    HEMIARTHROPLASTY HIP Left 2019    HIP HEMIARTHROPLASTY performed by Lacy Engel MD at [address] [place] [address] History     Socioeconomic History    Marital status:       Spouse name: None    Number of children: None    Years of education: None    Highest education level: None   Occupational History    None   Social Needs    Financial resource strain: None    Food insecurity:     Worry: None     Inability: None    Transportation needs:     Medical: None     Non-medical: None   Tobacco Use    Smoking status: Never Smoker    Smokeless tobacco: Never Used   Substance and Sexual Activity    Alcohol use: No    Drug use: No    Sexual activity: None   Lifestyle    Physical activity:     Days per week: None     Minutes per session: None    Stress: None   Relationships    Social connections:     Talks on phone: None     Gets together: None     Attends Yazidism service: None     Active member of club or organization: None     Attends meetings of clubs or organizations: None     Relationship status: None    Intimate partner violence:     Fear of current or ex partner: None     Emotionally abused: None     Physically abused: None     Forced sexual activity: None   Other Topics Concern    None   Social History Narrative    None       Medications/Allergies     Discharge Medication List as of 5/18/2019  9:41 PM      CONTINUE these medications which have NOT CHANGED    Details   docusate sodium (COLACE, DULCOLAX) 100 MG CAPS Take 100 mg by mouth 2 times daily, Disp-30 capsule, R-0Normal      amiodarone (CORDARONE) 200 MG tablet Take 1 tablet by mouth daily, Disp-30 tablet, R-0Print      carvedilol (COREG) 25 MG tablet Take 25 mg by mouth 2 times daily (with meals). MAGNESIUM-ZINC PO Take 1 tablet by mouth daily. levothyroxine (SYNTHROID) 75 MCG tablet Take 75 mcg by mouth Daily. aspirin 81 MG tablet Take 81 mg by mouth daily. Allergies   Allergen Reactions    Ibuprofen Other (See Comments)     Unknown     Pcn [Penicillins]         Physical Exam       ED Triage Vitals   BP Temp Temp Source Pulse Resp SpO2 Height Weight   05/18/19 2018 05/18/19 2018 05/18/19 2018 05/18/19 2018 05/18/19 2018 05/18/19 2018 -- 05/18/19 2015   (!) 152/70 98.7 °F (37.1 °C) Oral 95 14 97 %  150 lb 3.2 oz (68.1 kg)     GENERAL APPEARANCE:  Well-developed, well-nourished, no acute distress. HEAD:  NC/AT. EYES:  Sclera anicteric. ENT:  Ears, nose, mouth normal.     NECK:  Supple. CARDIO:  RRR. LUNGS:   CTAB. Respirations unlabored. ABDOMEN:  Soft, non-distended, non-tender.   BS active. EXTREMITIES:  No acute deformities. DP intact. SKIN:  Warm and dry. NEUROLOGICAL:  Alert and oriented. PSYCHIATRIC:  Pleasant affect. ED Course and MDM   -  Patient seen and evaluated in the emergency department. -  Triage and nursing notes reviewed and incorporated. -  Old chart records reviewed and incorporated. -  Supervising physician was Dr. Asha Cain. Patient was seen independently. -  Patient's family state they were told by [facility] that protocol was to send patient back to the ED for evaluation before they could take her home. Daughters both state that patient was agitated during her hospitalization as well as upon arrival to [facility], but they both believe that is because she does not like hospitals or being left alone and she is fearful if she goes to a nursing home she will never leave like her brother did in similar circumstances. They do not have any concerns for an acute encephalopathy and do not feel she needs evaluated for any acute process. I consulted Santiago Valero with Case Management, who saw and spoke with patient's family and confirmed home health services. Please see his note for further details. Patient discharged home with family. FU with PCP and Dr. Enriqueta Nunez or return here as needed. They are agreeable with plan of care and disposition.  -  Disposition:  Home    Final Impression      1.  History of left hip replacement        Adelaida Ribera PA-C  [address] [place], [place]  05/19/19 6025

## 2019-05-19 NOTE — CARE COORDINATION
LSW was consulted to assist this pt with d/c planning. Pt also identified as a possible readmission risk. This pt had Left displaced femoral neck fracture, subsequent surgery and was d[facility]'d [facility] to [facility]. LSW to room # 14 and pt sitting in w/c. Pt's dgt and grand dgt in room. They explained pt did not want to be @ [facility] & is ready to go home. Pt was only @ [facility] for appox 4 hours. Only reason they came here is to get [address] order and were directed to do so via [place]. Pt was very calm and quiet and pts grand dgt explained pt is going to stay w/her son @ [address] in [facility]. He has w/c ramp and his home is accessible. Pt has a w/c and will be utilizing [facility]. They already spoke to [address] stated she will complete home visit tomorrow as long as this is noted in the referral packet. Pts dgt noted need to get pt to the [facility] asap and requested LSW to ensure d/c is initiated. LSW explained process of Pablo writing d/c order and Nurse bringing papers to sign. LSW placed t/c to [facility] on-call and left message for return call. 2120 LSW received t/c from [facility] on-call staff. Discussed referral for this pt. Lesly Najera noted pt will be accepted and to fax packet. LSW spoke to Pablo concerning POC for this pt. LSW updated pt and family of [address] [facility] [address].     2140 LSW spoke to [facility] about pt's d/c and he processed d/c for pt., covering for pt's RN who was providing care to another pt.
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
## 2019-05-19 NOTE — ED NOTES
Bed: ED-14  Expected date:   Expected time:   Means of arrival:   Comments:  EMS     Abhay Dangelo RN  05/18/19 2010

## 2019-05-31 ENCOUNTER — CARE COORDINATION (OUTPATIENT)
Dept: CASE MANAGEMENT | Age: 84
End: 2019-05-31

## 2019-10-16 ENCOUNTER — HOSPITAL ENCOUNTER (OUTPATIENT)
Age: 84
Setting detail: SPECIMEN
Discharge: HOME | End: 2019-10-16
Payer: COMMERCIAL

## 2019-10-16 LAB
FERRITIN: 102 NG/ML (ref 15–150)
IRON: 54 UG/DL (ref 37–145)
PCT TRANSFERRIN: 15 % (ref 10–44)
TOTAL IRON BINDING CAPACITY: 349 UG/DL (ref 250–450)
UNSATURATED IRON BINDING CAPACITY: 295 UG/DL (ref 110–370)
VITAMIN B-12: >2000 PG/ML (ref 211–911)

## 2019-10-16 PROCEDURE — 82607 VITAMIN B-12: CPT

## 2019-10-16 PROCEDURE — 83540 ASSAY OF IRON: CPT

## 2019-10-16 PROCEDURE — 82728 ASSAY OF FERRITIN: CPT

## 2019-10-16 PROCEDURE — 83550 IRON BINDING TEST: CPT

## 2020-07-08 ENCOUNTER — APPOINTMENT (OUTPATIENT)
Dept: CT IMAGING | Age: 85
DRG: 689 | End: 2020-07-08
Payer: COMMERCIAL

## 2020-07-08 ENCOUNTER — HOSPITAL ENCOUNTER (INPATIENT)
Age: 85
LOS: 2 days | Discharge: PSYCHIATRIC OR SUBSTANCE USE FACILITY | DRG: 689 | End: 2020-07-10
Attending: EMERGENCY MEDICINE | Admitting: INTERNAL MEDICINE
Payer: COMMERCIAL

## 2020-07-08 PROBLEM — G93.41 ACUTE METABOLIC ENCEPHALOPATHY: Status: ACTIVE | Noted: 2020-07-08

## 2020-07-08 LAB
AMORPHOUS: ABNORMAL /HPF
ANION GAP SERPL CALCULATED.3IONS-SCNC: 10 MMOL/L (ref 4–16)
BACTERIA: NEGATIVE /HPF
BASOPHILS ABSOLUTE: 0.1 K/CU MM
BASOPHILS RELATIVE PERCENT: 1.2 % (ref 0–1)
BILIRUBIN URINE: NEGATIVE MG/DL
BLOOD, URINE: NEGATIVE
BUN BLDV-MCNC: 29 MG/DL (ref 6–23)
CALCIUM SERPL-MCNC: 9.3 MG/DL (ref 8.3–10.6)
CHLORIDE BLD-SCNC: 102 MMOL/L (ref 99–110)
CLARITY: ABNORMAL
CO2: 26 MMOL/L (ref 21–32)
COLOR: YELLOW
CREAT SERPL-MCNC: 0.8 MG/DL (ref 0.6–1.1)
DIFFERENTIAL TYPE: ABNORMAL
EOSINOPHILS ABSOLUTE: 0.2 K/CU MM
EOSINOPHILS RELATIVE PERCENT: 2.8 % (ref 0–3)
FOLATE: >20 NG/ML (ref 3.1–17.5)
GFR AFRICAN AMERICAN: >60 ML/MIN/1.73M2
GFR NON-AFRICAN AMERICAN: >60 ML/MIN/1.73M2
GLUCOSE BLD-MCNC: 100 MG/DL (ref 70–99)
GLUCOSE, URINE: NEGATIVE MG/DL
HCT VFR BLD CALC: 34.9 % (ref 37–47)
HEMOGLOBIN: 10.8 GM/DL (ref 12.5–16)
IMMATURE NEUTROPHIL %: 0.3 % (ref 0–0.43)
KETONES, URINE: NEGATIVE MG/DL
LEUKOCYTE ESTERASE, URINE: ABNORMAL
LYMPHOCYTES ABSOLUTE: 0.9 K/CU MM
LYMPHOCYTES RELATIVE PERCENT: 16.3 % (ref 24–44)
MCH RBC QN AUTO: 28.7 PG (ref 27–31)
MCHC RBC AUTO-ENTMCNC: 30.9 % (ref 32–36)
MCV RBC AUTO: 92.8 FL (ref 78–100)
MONOCYTES ABSOLUTE: 0.7 K/CU MM
MONOCYTES RELATIVE PERCENT: 11.3 % (ref 0–4)
NITRITE URINE, QUANTITATIVE: NEGATIVE
NUCLEATED RBC %: 0 %
PDW BLD-RTO: 15.2 % (ref 11.7–14.9)
PH, URINE: 7 (ref 5–8)
PLATELET # BLD: 252 K/CU MM (ref 140–440)
PMV BLD AUTO: 9 FL (ref 7.5–11.1)
POTASSIUM SERPL-SCNC: 4.3 MMOL/L (ref 3.5–5.1)
PROTEIN UA: NEGATIVE MG/DL
RBC # BLD: 3.76 M/CU MM (ref 4.2–5.4)
RBC URINE: ABNORMAL /HPF (ref 0–6)
SEGMENTED NEUTROPHILS ABSOLUTE COUNT: 3.9 K/CU MM
SEGMENTED NEUTROPHILS RELATIVE PERCENT: 68.1 % (ref 36–66)
SODIUM BLD-SCNC: 138 MMOL/L (ref 135–145)
SPECIFIC GRAVITY UA: 1.02 (ref 1–1.03)
SQUAMOUS EPITHELIAL: 6 /HPF
TOTAL IMMATURE NEUTOROPHIL: 0.02 K/CU MM
TOTAL NUCLEATED RBC: 0 K/CU MM
TRICHOMONAS: ABNORMAL /HPF
TSH HIGH SENSITIVITY: 2.92 UIU/ML (ref 0.27–4.2)
UROBILINOGEN, URINE: NORMAL MG/DL (ref 0.2–1)
VITAMIN B-12: 1277 PG/ML (ref 211–911)
WBC # BLD: 5.8 K/CU MM (ref 4–10.5)
WBC CLUMP: ABNORMAL /HPF
WBC UA: 27 /HPF (ref 0–5)

## 2020-07-08 PROCEDURE — 99285 EMERGENCY DEPT VISIT HI MDM: CPT

## 2020-07-08 PROCEDURE — 85025 COMPLETE CBC W/AUTO DIFF WBC: CPT

## 2020-07-08 PROCEDURE — 96372 THER/PROPH/DIAG INJ SC/IM: CPT

## 2020-07-08 PROCEDURE — 93005 ELECTROCARDIOGRAM TRACING: CPT | Performed by: EMERGENCY MEDICINE

## 2020-07-08 PROCEDURE — G0378 HOSPITAL OBSERVATION PER HR: HCPCS

## 2020-07-08 PROCEDURE — 6360000002 HC RX W HCPCS: Performed by: NURSE PRACTITIONER

## 2020-07-08 PROCEDURE — 70450 CT HEAD/BRAIN W/O DYE: CPT

## 2020-07-08 PROCEDURE — 82607 VITAMIN B-12: CPT

## 2020-07-08 PROCEDURE — 81001 URINALYSIS AUTO W/SCOPE: CPT

## 2020-07-08 PROCEDURE — 84443 ASSAY THYROID STIM HORMONE: CPT

## 2020-07-08 PROCEDURE — 6360000002 HC RX W HCPCS: Performed by: PHYSICIAN ASSISTANT

## 2020-07-08 PROCEDURE — 87086 URINE CULTURE/COLONY COUNT: CPT

## 2020-07-08 PROCEDURE — 2580000003 HC RX 258: Performed by: EMERGENCY MEDICINE

## 2020-07-08 PROCEDURE — 82746 ASSAY OF FOLIC ACID SERUM: CPT

## 2020-07-08 PROCEDURE — 1200000000 HC SEMI PRIVATE

## 2020-07-08 PROCEDURE — 80048 BASIC METABOLIC PNL TOTAL CA: CPT

## 2020-07-08 PROCEDURE — 96375 TX/PRO/DX INJ NEW DRUG ADDON: CPT

## 2020-07-08 PROCEDURE — 2580000003 HC RX 258: Performed by: NURSE PRACTITIONER

## 2020-07-08 PROCEDURE — 36415 COLL VENOUS BLD VENIPUNCTURE: CPT

## 2020-07-08 PROCEDURE — 96365 THER/PROPH/DIAG IV INF INIT: CPT

## 2020-07-08 PROCEDURE — 6360000002 HC RX W HCPCS: Performed by: EMERGENCY MEDICINE

## 2020-07-08 RX ORDER — PROMETHAZINE HYDROCHLORIDE 12.5 MG/1
12.5 TABLET ORAL EVERY 6 HOURS PRN
Status: DISCONTINUED | OUTPATIENT
Start: 2020-07-08 | End: 2020-07-10 | Stop reason: HOSPADM

## 2020-07-08 RX ORDER — DOCUSATE SODIUM 100 MG/1
100 CAPSULE, LIQUID FILLED ORAL 2 TIMES DAILY
Status: DISCONTINUED | OUTPATIENT
Start: 2020-07-08 | End: 2020-07-10 | Stop reason: HOSPADM

## 2020-07-08 RX ORDER — ACETAMINOPHEN 650 MG/1
650 SUPPOSITORY RECTAL EVERY 6 HOURS PRN
Status: DISCONTINUED | OUTPATIENT
Start: 2020-07-08 | End: 2020-07-10 | Stop reason: HOSPADM

## 2020-07-08 RX ORDER — SODIUM CHLORIDE 0.9 % (FLUSH) 0.9 %
10 SYRINGE (ML) INJECTION PRN
Status: DISCONTINUED | OUTPATIENT
Start: 2020-07-08 | End: 2020-07-10 | Stop reason: HOSPADM

## 2020-07-08 RX ORDER — SODIUM CHLORIDE 0.9 % (FLUSH) 0.9 %
10 SYRINGE (ML) INJECTION EVERY 12 HOURS SCHEDULED
Status: DISCONTINUED | OUTPATIENT
Start: 2020-07-08 | End: 2020-07-10 | Stop reason: HOSPADM

## 2020-07-08 RX ORDER — HALOPERIDOL 5 MG/ML
0.5 INJECTION INTRAMUSCULAR EVERY 6 HOURS PRN
Status: DISCONTINUED | OUTPATIENT
Start: 2020-07-08 | End: 2020-07-10 | Stop reason: HOSPADM

## 2020-07-08 RX ORDER — AMIODARONE HYDROCHLORIDE 200 MG/1
200 TABLET ORAL DAILY
Status: DISCONTINUED | OUTPATIENT
Start: 2020-07-08 | End: 2020-07-10 | Stop reason: HOSPADM

## 2020-07-08 RX ORDER — POLYETHYLENE GLYCOL 3350 17 G/17G
17 POWDER, FOR SOLUTION ORAL DAILY PRN
Status: DISCONTINUED | OUTPATIENT
Start: 2020-07-08 | End: 2020-07-10 | Stop reason: HOSPADM

## 2020-07-08 RX ORDER — ASPIRIN 81 MG/1
81 TABLET, CHEWABLE ORAL DAILY
Status: DISCONTINUED | OUTPATIENT
Start: 2020-07-08 | End: 2020-07-10 | Stop reason: HOSPADM

## 2020-07-08 RX ORDER — DIPHENHYDRAMINE HYDROCHLORIDE 50 MG/ML
25 INJECTION INTRAMUSCULAR; INTRAVENOUS EVERY 6 HOURS PRN
Status: DISCONTINUED | OUTPATIENT
Start: 2020-07-08 | End: 2020-07-10 | Stop reason: HOSPADM

## 2020-07-08 RX ORDER — ACETAMINOPHEN 325 MG/1
650 TABLET ORAL EVERY 6 HOURS PRN
Status: DISCONTINUED | OUTPATIENT
Start: 2020-07-08 | End: 2020-07-10 | Stop reason: HOSPADM

## 2020-07-08 RX ORDER — SODIUM CHLORIDE 9 MG/ML
INJECTION, SOLUTION INTRAVENOUS CONTINUOUS
Status: DISCONTINUED | OUTPATIENT
Start: 2020-07-08 | End: 2020-07-10 | Stop reason: HOSPADM

## 2020-07-08 RX ORDER — LEVOTHYROXINE SODIUM 0.07 MG/1
75 TABLET ORAL DAILY
Status: DISCONTINUED | OUTPATIENT
Start: 2020-07-09 | End: 2020-07-10 | Stop reason: HOSPADM

## 2020-07-08 RX ORDER — ONDANSETRON 2 MG/ML
4 INJECTION INTRAMUSCULAR; INTRAVENOUS EVERY 6 HOURS PRN
Status: DISCONTINUED | OUTPATIENT
Start: 2020-07-08 | End: 2020-07-10 | Stop reason: HOSPADM

## 2020-07-08 RX ORDER — DIPHENHYDRAMINE HYDROCHLORIDE 50 MG/ML
50 INJECTION INTRAMUSCULAR; INTRAVENOUS ONCE
Status: COMPLETED | OUTPATIENT
Start: 2020-07-08 | End: 2020-07-08

## 2020-07-08 RX ORDER — CARVEDILOL 25 MG/1
25 TABLET ORAL 2 TIMES DAILY WITH MEALS
Status: DISCONTINUED | OUTPATIENT
Start: 2020-07-08 | End: 2020-07-10 | Stop reason: HOSPADM

## 2020-07-08 RX ADMIN — SODIUM CHLORIDE: 9 INJECTION, SOLUTION INTRAVENOUS at 21:14

## 2020-07-08 RX ADMIN — DIPHENHYDRAMINE HYDROCHLORIDE 50 MG: 50 INJECTION, SOLUTION INTRAMUSCULAR; INTRAVENOUS at 20:31

## 2020-07-08 RX ADMIN — CEFTRIAXONE 1 G: 1 INJECTION, POWDER, FOR SOLUTION INTRAMUSCULAR; INTRAVENOUS at 18:44

## 2020-07-08 RX ADMIN — HALOPERIDOL LACTATE 0.5 MG: 5 INJECTION, SOLUTION INTRAMUSCULAR at 20:31

## 2020-07-08 ASSESSMENT — PAIN SCALES - GENERAL: PAINLEVEL_OUTOF10: 0

## 2020-07-08 NOTE — ED PROVIDER NOTES
level: None   Occupational History    None   Social Needs    Financial resource strain: None    Food insecurity     Worry: None     Inability: None    Transportation needs     Medical: None     Non-medical: None   Tobacco Use    Smoking status: Never Smoker    Smokeless tobacco: Never Used   Substance and Sexual Activity    Alcohol use: No    Drug use: No    Sexual activity: None   Lifestyle    Physical activity     Days per week: None     Minutes per session: None    Stress: None   Relationships    Social connections     Talks on phone: None     Gets together: None     Attends Judaism service: None     Active member of club or organization: None     Attends meetings of clubs or organizations: None     Relationship status: None    Intimate partner violence     Fear of current or ex partner: None     Emotionally abused: None     Physically abused: None     Forced sexual activity: None   Other Topics Concern    None   Social History Narrative    None       SURGICAL HISTORY  Past Surgical History:   Procedure Laterality Date    HEMIARTHROPLASTY HIP Left 5/14/2019    HIP HEMIARTHROPLASTY performed by Olvin Echavarria MD at [facility]  Previous Medications    AMIODARONE (CORDARONE) 200 MG TABLET    Take 1 tablet by mouth daily    ASPIRIN 81 MG TABLET    Take 81 mg by mouth daily. CARVEDILOL (COREG) 25 MG TABLET    Take 25 mg by mouth 2 times daily (with meals). DOCUSATE SODIUM (COLACE, DULCOLAX) 100 MG CAPS    Take 100 mg by mouth 2 times daily    LEVOTHYROXINE (SYNTHROID) 75 MCG TABLET    Take 75 mcg by mouth Daily. MAGNESIUM-ZINC PO    Take 1 tablet by mouth daily.      ALLERGIES  Allergies   Allergen Reactions    Ibuprofen Other (See Comments)     Unknown     Pcn [Penicillins]      PHYSICAL EXAM  VITAL SIGNS:   ED Triage Vitals   Enc Vitals Group      BP 07/08/20 1542 138/87      Pulse 07/08/20 1542 82      Resp 07/08/20 1542 16      Temp 07/08/20 1522 98 °F (36.7 °C)      Temp Source 07/08/20 1522 Oral      SpO2 07/08/20 1546 99 %      Weight 07/08/20 1524 120 lb (54.4 kg)      Height 07/08/20 1524 4' 11.5\" (1.511 m)      Head Circumference --       Peak Flow --       Pain Score --       Pain Loc --       Pain Edu? --       Excl. in [address]? --      Constitutional: Well developed, Well nourished, nontoxic appearing  HENT: Normocephalic, Atraumatic, Bilateral external ears normal, Oropharynx moist, No oral exudates, Nose normal.   Eyes: PERRL, EOMI, Conjunctiva normal, No discharge. No scleral icterus. Neck: Normal range of motion, No tenderness, Supple. Lymphatic: No lymphadenopathy noted. Cardiovascular: Normal heart rate, Normal rhythm, No murmurs, gallops or rubs. Thorax & Lungs: Normal breath sounds, No respiratory distress, No wheezing. Abdomen: Soft, No tenderness, No masses, No pulsatile masses, No distention, Normal bowel sounds  Skin: Warm, Dry, Pink, No mottling, No erythema, No rash. Back: No tenderness, No CVA tenderness. Extremities: No edema, No tenderness, No cyanosis, Normal perfusion, No clubbing. Musculoskeletal: Good range of motion in all major joints as observed. No major deformities noted. Neurologic: Alert & oriented to person only, Normal motor function, Normal sensory function, CN II-XII grossly intact as tested, No focal deficits noted. Psychiatric: Confused, cooperative  EKG  Demonstrates normal sinus rhythm at a rate of 70 bpm.  Normal axis. Normal intervals. No acute ST segment changes.   RADIOLOGY  Labs Reviewed   CBC WITH AUTO DIFFERENTIAL - Abnormal; Notable for the following components:       Result Value    RBC 3.76 (*)     Hemoglobin 10.8 (*)     Hematocrit 34.9 (*)     MCHC 30.9 (*)     RDW 15.2 (*)     Segs Relative 68.1 (*)     Lymphocytes % 16.3 (*)     Monocytes % 11.3 (*)     Basophils % 1.2 (*)     All other components within normal limits   BASIC METABOLIC PANEL - Abnormal; Notable for the following components:    BUN 29 (*)     Glucose 100 (*)     All other components within normal limits   URINALYSIS WITH MICROSCOPIC - Abnormal; Notable for the following components:    Clarity, UA SLIGHTLY CLOUDY (*)     Leukocyte Esterase, Urine LARGE (*)     WBC, UA 27 (*)     All other components within normal limits   CULTURE, URINE     I personally reviewed the images. The radiologist's interpretation reveals:  Last Imaging results   CT HEAD WO CONTRAST   Final Result   No acute intracranial abnormality. Generalized atrophy and chronic small vessel ischemic white matter disease. Procedures  NA  MEDS GIVEN IN ED:  Medications   cefTRIAXone (ROCEPHIN) 1 g IVPB in 50 mL D5W minibag (1 g Intravenous New Bag 7/8/20 0904)     COURSE & MEDICAL DECISION MAKING  20-year-old female presents emergency department accompanied by family members with reports of worsening confusion and agitation with aggression at home. They feel overwhelmed and unable to care for the patient. Her vital signs are reassuring. Patient is nontoxic-appearing on exam.  On neurological exam she is oriented to person only. No focal deficits otherwise. At this time we will obtain CT of the head, CBC, BMP, urinalysis, and EKG. CBC shows baseline anemia. MP is reassuring. EKG is reassuring. CT of the head is without acute intracranial pathology. Urinalysis shows a large amount of leukocyte esterase and white blood cells. We will send for culture. Given the agitation worsening confusion we will treat her for UTI with Rocephin. At this time the patient will benefit from hospitalization for possible placement into a nursing home facility given her level of aggression and inability of family to care for her at home. This was discussed with hospitalist service who agreed to hospitalize patient for further management. ?  Time of Disposition: See timeline  ? New Prescriptions    No medications on file     FINAL IMPRESSION  1.  Dementia with behavioral disturbance, unspecified dementia type ([facility]    2. Agitation    3. Urinary tract infection with hematuria, site unspecified        Electronically signed by:  [address], DO, 7/8/2020         [address], DO  07/08/20 5679

## 2020-07-08 NOTE — ED NOTES
Pt refusing to get into gown at this time. Going to wait for daughter to come to help calm patient.       Filomena Aldana RN  07/08/20 7407
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
## 2020-07-08 NOTE — ED NOTES
Charge nurse called to give report, currently they are doing handoff report so will call back in 20mins.      Radha Buchanan RN  07/08/20 2712
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
## 2020-07-08 NOTE — ED NOTES
Sitter at the bedside, no physician has assigned themselves to patient yet at this time. granddaughter at the bedside as well.       Diamond Merlos RN  07/08/20 7148

## 2020-07-08 NOTE — ED NOTES
Meal tray ordered, regular diet on a safety tray.      [facility]  07/08/20 2689

## 2020-07-09 PROCEDURE — 6370000000 HC RX 637 (ALT 250 FOR IP): Performed by: NURSE PRACTITIONER

## 2020-07-09 PROCEDURE — 96372 THER/PROPH/DIAG INJ SC/IM: CPT

## 2020-07-09 PROCEDURE — 94761 N-INVAS EAR/PLS OXIMETRY MLT: CPT

## 2020-07-09 PROCEDURE — 6360000002 HC RX W HCPCS: Performed by: PHYSICIAN ASSISTANT

## 2020-07-09 PROCEDURE — 2580000003 HC RX 258: Performed by: NURSE PRACTITIONER

## 2020-07-09 PROCEDURE — 93010 ELECTROCARDIOGRAM REPORT: CPT | Performed by: INTERNAL MEDICINE

## 2020-07-09 PROCEDURE — 6360000002 HC RX W HCPCS: Performed by: NURSE PRACTITIONER

## 2020-07-09 PROCEDURE — 99221 1ST HOSP IP/OBS SF/LOW 40: CPT | Performed by: NURSE PRACTITIONER

## 2020-07-09 PROCEDURE — 96376 TX/PRO/DX INJ SAME DRUG ADON: CPT

## 2020-07-09 PROCEDURE — G0378 HOSPITAL OBSERVATION PER HR: HCPCS

## 2020-07-09 PROCEDURE — 1200000000 HC SEMI PRIVATE

## 2020-07-09 PROCEDURE — 96366 THER/PROPH/DIAG IV INF ADDON: CPT

## 2020-07-09 PROCEDURE — 2500000003 HC RX 250 WO HCPCS: Performed by: INTERNAL MEDICINE

## 2020-07-09 RX ORDER — OLANZAPINE 10 MG/1
5 INJECTION, POWDER, LYOPHILIZED, FOR SOLUTION INTRAMUSCULAR ONCE
Status: COMPLETED | OUTPATIENT
Start: 2020-07-09 | End: 2020-07-09

## 2020-07-09 RX ORDER — LOSARTAN POTASSIUM 50 MG/1
50 TABLET ORAL DAILY
COMMUNITY

## 2020-07-09 RX ORDER — QUETIAPINE FUMARATE 100 MG/1
50 TABLET, FILM COATED ORAL NIGHTLY
COMMUNITY

## 2020-07-09 RX ORDER — HALOPERIDOL 5 MG/ML
5 INJECTION INTRAMUSCULAR ONCE
Status: COMPLETED | OUTPATIENT
Start: 2020-07-09 | End: 2020-07-09

## 2020-07-09 RX ORDER — LANOLIN ALCOHOL/MO/W.PET/CERES
1000 CREAM (GRAM) TOPICAL DAILY
COMMUNITY

## 2020-07-09 RX ADMIN — HALOPERIDOL LACTATE 0.5 MG: 5 INJECTION, SOLUTION INTRAMUSCULAR at 11:04

## 2020-07-09 RX ADMIN — HALOPERIDOL LACTATE 0.5 MG: 5 INJECTION, SOLUTION INTRAMUSCULAR at 03:39

## 2020-07-09 RX ADMIN — CARVEDILOL 25 MG: 25 TABLET, FILM COATED ORAL at 08:49

## 2020-07-09 RX ADMIN — DIPHENHYDRAMINE HYDROCHLORIDE 25 MG: 50 INJECTION, SOLUTION INTRAMUSCULAR; INTRAVENOUS at 03:39

## 2020-07-09 RX ADMIN — DIPHENHYDRAMINE HYDROCHLORIDE 25 MG: 50 INJECTION, SOLUTION INTRAMUSCULAR; INTRAVENOUS at 22:51

## 2020-07-09 RX ADMIN — OLANZAPINE 5 MG: 10 INJECTION, POWDER, LYOPHILIZED, FOR SOLUTION INTRAMUSCULAR at 07:32

## 2020-07-09 RX ADMIN — DIPHENHYDRAMINE HYDROCHLORIDE 25 MG: 50 INJECTION, SOLUTION INTRAMUSCULAR; INTRAVENOUS at 11:04

## 2020-07-09 RX ADMIN — CEFTRIAXONE SODIUM 1 G: 1 INJECTION, POWDER, FOR SOLUTION INTRAMUSCULAR; INTRAVENOUS at 08:50

## 2020-07-09 RX ADMIN — LEVOTHYROXINE SODIUM 75 MCG: 75 TABLET ORAL at 05:21

## 2020-07-09 RX ADMIN — CARVEDILOL 25 MG: 25 TABLET, FILM COATED ORAL at 16:27

## 2020-07-09 RX ADMIN — AMIODARONE HYDROCHLORIDE 200 MG: 200 TABLET ORAL at 08:50

## 2020-07-09 RX ADMIN — HALOPERIDOL LACTATE 5 MG: 5 INJECTION, SOLUTION INTRAMUSCULAR at 21:24

## 2020-07-09 RX ADMIN — HALOPERIDOL LACTATE 0.5 MG: 5 INJECTION, SOLUTION INTRAMUSCULAR at 16:27

## 2020-07-09 ASSESSMENT — PAIN SCALES - GENERAL
PAINLEVEL_OUTOF10: 0
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
## 2020-07-09 NOTE — PROGRESS NOTES
Hospitalist Progress Note      Name:  Le Murdock /Age/Sex: 10/2/1931  (80 y.o. female)   MRN & CSN:  [id number] & [id number] Admission Date/Time: 2020  3:20 PM   Location:  [address] [place] [address] PCP: Gregor Todd MD         Hospital Day: 2    Assessment and Plan:   Le Murdock is a 80 y.o.  female  who presents with AMS    1) Acute metabolic encephalopathy 2/2 UTI  -CT Head: No acute intracranial abnormality  -UA suggestive of UTI  -Await Urine culture  -Continue on broad spectrum abx  -Continue sitter and await Psych  -If possible, avoid all benzos. Ok to use antipsychotics (mainly atypical) in extreme cases of agitation       2) HTN  -Continue amiodarone and Coreg     3) Hypothyroidism  -Continue levothyroxine    4) CAD  -Continue aspirin, beta-blocker     Other Chronic medical conditions; medication resumed unless contraindicated    -Dementia  -Myelodysplastic disease       Diet DIET GENERAL; Safety Tray; Safety Tray (Disposables)   DVT Prophylaxis [] Lovenox, []  Heparin, [] SCDs, [] Ambulation   GI Prophylaxis [] PPI,  [] H2 Blocker,  [] Carafate,  [] Diet/Tube Feeds   Code Status Full Code   Disposition TBD   MDM      History of Present Illness:     Patient was seen and examined  Was very agitated this morning and threatening to leave  I tried to redirect her but to no avail  Symptoms seem to improve after a dose of zyprexa    Ten point ROS reviewed negative, unless as noted above    Objective:   No intake or output data in the 24 hours ending 20 0831   Vitals:   Vitals:    20 0432   BP: (!) 154/80   Pulse: 72   Resp: 18   Temp: 97.8 °F (36.6 °C)   SpO2: 97%     Physical Exam:   GEN Awake female, sitting upright in bed in no apparent distress. Appears given age. EYES Pupils are equally round. No scleral erythema, discharge, or conjunctivitis. HENT Mucous membranes are moist. Oral pharynx without exudates, no evidence of thrush. NECK Supple, no apparent thyromegaly or masses.   RESP Clear
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
## 2020-07-10 ENCOUNTER — HOSPITAL ENCOUNTER (INPATIENT)
Age: 85
LOS: 9 days | Discharge: ANOTHER HOSPITAL | DRG: 884 | End: 2020-07-19
Attending: PSYCHIATRY & NEUROLOGY | Admitting: PSYCHIATRY & NEUROLOGY
Payer: COMMERCIAL

## 2020-07-10 VITALS
WEIGHT: 120 LBS | OXYGEN SATURATION: 95 % | DIASTOLIC BLOOD PRESSURE: 81 MMHG | HEART RATE: 81 BPM | BODY MASS INDEX: 23.56 KG/M2 | TEMPERATURE: 98.1 F | RESPIRATION RATE: 16 BRPM | SYSTOLIC BLOOD PRESSURE: 147 MMHG | HEIGHT: 60 IN

## 2020-07-10 PROBLEM — F03.918 DEMENTIA WITH BEHAVIORAL DISTURBANCE: Status: ACTIVE | Noted: 2020-07-10

## 2020-07-10 PROBLEM — F03.918 DEMENTIA WITH BEHAVIORAL DISTURBANCE: Status: RESOLVED | Noted: 2020-07-10 | Resolved: 2020-07-10

## 2020-07-10 PROBLEM — I34.0 MILD MITRAL REGURGITATION: Status: ACTIVE | Noted: 2019-05-14

## 2020-07-10 PROBLEM — I35.1 MILD AORTIC INSUFFICIENCY: Status: ACTIVE | Noted: 2019-05-14

## 2020-07-10 PROBLEM — I51.89 GRADE I DIASTOLIC DYSFUNCTION: Status: ACTIVE | Noted: 2019-05-14

## 2020-07-10 PROBLEM — I51.7 MILD LEFT VENTRICULAR HYPERTROPHY: Status: ACTIVE | Noted: 2019-05-14

## 2020-07-10 PROBLEM — F01.518 SUBCORTICAL VASCULAR DEMENTIA WITH BEHAVIORAL DISTURBANCE: Status: ACTIVE | Noted: 2020-07-10

## 2020-07-10 LAB
CULTURE: NORMAL
Lab: NORMAL
SARS-COV-2, NAAT: NOT DETECTED
SOURCE: NORMAL
SPECIMEN: NORMAL

## 2020-07-10 PROCEDURE — U0002 COVID-19 LAB TEST NON-CDC: HCPCS

## 2020-07-10 PROCEDURE — 6360000002 HC RX W HCPCS: Performed by: NURSE PRACTITIONER

## 2020-07-10 PROCEDURE — 6370000000 HC RX 637 (ALT 250 FOR IP): Performed by: NURSE PRACTITIONER

## 2020-07-10 PROCEDURE — 1240000000 HC EMOTIONAL WELLNESS R&B

## 2020-07-10 PROCEDURE — 96372 THER/PROPH/DIAG INJ SC/IM: CPT

## 2020-07-10 PROCEDURE — G0378 HOSPITAL OBSERVATION PER HR: HCPCS

## 2020-07-10 RX ORDER — CARVEDILOL 6.25 MG/1
12.5 TABLET ORAL 2 TIMES DAILY WITH MEALS
Status: DISCONTINUED | OUTPATIENT
Start: 2020-07-10 | End: 2020-07-19 | Stop reason: HOSPADM

## 2020-07-10 RX ORDER — LANOLIN ALCOHOL/MO/W.PET/CERES
200 CREAM (GRAM) TOPICAL DAILY
Status: DISCONTINUED | OUTPATIENT
Start: 2020-07-10 | End: 2020-07-19 | Stop reason: HOSPADM

## 2020-07-10 RX ORDER — ACETAMINOPHEN 500 MG
500 TABLET ORAL EVERY 4 HOURS PRN
Status: DISCONTINUED | OUTPATIENT
Start: 2020-07-10 | End: 2020-07-13 | Stop reason: ALTCHOICE

## 2020-07-10 RX ORDER — AMIODARONE HYDROCHLORIDE 200 MG/1
200 TABLET ORAL DAILY
Status: DISCONTINUED | OUTPATIENT
Start: 2020-07-11 | End: 2020-07-19 | Stop reason: HOSPADM

## 2020-07-10 RX ORDER — HALOPERIDOL 0.5 MG/1
0.5 TABLET ORAL 4 TIMES DAILY
Status: ON HOLD | COMMUNITY
End: 2020-07-21 | Stop reason: HOSPADM

## 2020-07-10 RX ORDER — LEVOTHYROXINE SODIUM 0.07 MG/1
75 TABLET ORAL DAILY
Status: DISCONTINUED | OUTPATIENT
Start: 2020-07-11 | End: 2020-07-19 | Stop reason: HOSPADM

## 2020-07-10 RX ORDER — DIPHENHYDRAMINE HYDROCHLORIDE 50 MG/ML
25 INJECTION INTRAMUSCULAR; INTRAVENOUS EVERY 6 HOURS PRN
Status: ON HOLD | COMMUNITY
End: 2020-07-21 | Stop reason: HOSPADM

## 2020-07-10 RX ORDER — ACETAMINOPHEN 325 MG/1
325 TABLET ORAL EVERY 4 HOURS PRN
Status: DISCONTINUED | OUTPATIENT
Start: 2020-07-10 | End: 2020-07-13 | Stop reason: ALTCHOICE

## 2020-07-10 RX ORDER — DONEPEZIL HYDROCHLORIDE 5 MG/1
5 TABLET, FILM COATED ORAL NIGHTLY
Status: DISCONTINUED | OUTPATIENT
Start: 2020-07-10 | End: 2020-07-19 | Stop reason: HOSPADM

## 2020-07-10 RX ORDER — DIPHENHYDRAMINE HYDROCHLORIDE 50 MG/ML
25 INJECTION INTRAMUSCULAR; INTRAVENOUS EVERY 4 HOURS PRN
Status: DISCONTINUED | OUTPATIENT
Start: 2020-07-10 | End: 2020-07-19 | Stop reason: HOSPADM

## 2020-07-10 RX ORDER — DOCUSATE SODIUM 100 MG/1
100 CAPSULE, LIQUID FILLED ORAL 2 TIMES DAILY
Status: DISCONTINUED | OUTPATIENT
Start: 2020-07-11 | End: 2020-07-13 | Stop reason: ALTCHOICE

## 2020-07-10 RX ORDER — ACETAMINOPHEN 325 MG/1
650 TABLET ORAL EVERY 4 HOURS PRN
Status: DISCONTINUED | OUTPATIENT
Start: 2020-07-10 | End: 2020-07-13 | Stop reason: ALTCHOICE

## 2020-07-10 RX ORDER — TRAZODONE HYDROCHLORIDE 50 MG/1
50 TABLET ORAL NIGHTLY PRN
Status: DISCONTINUED | OUTPATIENT
Start: 2020-07-10 | End: 2020-07-19 | Stop reason: HOSPADM

## 2020-07-10 RX ORDER — LOSARTAN POTASSIUM 50 MG/1
50 TABLET ORAL DAILY
Status: DISCONTINUED | OUTPATIENT
Start: 2020-07-10 | End: 2020-07-19 | Stop reason: HOSPADM

## 2020-07-10 RX ORDER — HALOPERIDOL 5 MG/ML
5 INJECTION INTRAMUSCULAR EVERY 4 HOURS PRN
Status: DISCONTINUED | OUTPATIENT
Start: 2020-07-10 | End: 2020-07-13

## 2020-07-10 RX ORDER — QUETIAPINE FUMARATE 50 MG/1
50 TABLET, FILM COATED ORAL NIGHTLY
Status: DISCONTINUED | OUTPATIENT
Start: 2020-07-10 | End: 2020-07-19 | Stop reason: HOSPADM

## 2020-07-10 RX ORDER — ASPIRIN 81 MG/1
81 TABLET ORAL DAILY
Status: DISCONTINUED | OUTPATIENT
Start: 2020-07-11 | End: 2020-07-13 | Stop reason: ALTCHOICE

## 2020-07-10 RX ORDER — CEFDINIR 300 MG/1
300 CAPSULE ORAL 2 TIMES DAILY
Qty: 10 CAPSULE | Refills: 0 | Status: SHIPPED | OUTPATIENT
Start: 2020-07-10 | End: 2020-07-15

## 2020-07-10 RX ORDER — LANOLIN ALCOHOL/MO/W.PET/CERES
1000 CREAM (GRAM) TOPICAL DAILY
Status: DISCONTINUED | OUTPATIENT
Start: 2020-07-10 | End: 2020-07-19 | Stop reason: HOSPADM

## 2020-07-10 RX ORDER — LORAZEPAM 2 MG/ML
1 INJECTION INTRAMUSCULAR EVERY 4 HOURS PRN
Status: DISCONTINUED | OUTPATIENT
Start: 2020-07-10 | End: 2020-07-19 | Stop reason: HOSPADM

## 2020-07-10 RX ORDER — CEFDINIR 300 MG/1
300 CAPSULE ORAL 2 TIMES DAILY
Status: DISCONTINUED | OUTPATIENT
Start: 2020-07-10 | End: 2020-07-11

## 2020-07-10 RX ORDER — POLYETHYLENE GLYCOL 3350 17 G/17G
17 POWDER, FOR SOLUTION ORAL DAILY PRN
Status: DISCONTINUED | OUTPATIENT
Start: 2020-07-10 | End: 2020-07-19 | Stop reason: HOSPADM

## 2020-07-10 RX ORDER — LORAZEPAM 1 MG/1
1 TABLET ORAL EVERY 4 HOURS PRN
Status: DISCONTINUED | OUTPATIENT
Start: 2020-07-10 | End: 2020-07-19 | Stop reason: HOSPADM

## 2020-07-10 RX ORDER — HALOPERIDOL 5 MG
5 TABLET ORAL EVERY 4 HOURS PRN
Status: DISCONTINUED | OUTPATIENT
Start: 2020-07-10 | End: 2020-07-13

## 2020-07-10 RX ADMIN — DIPHENHYDRAMINE HYDROCHLORIDE 25 MG: 50 INJECTION, SOLUTION INTRAMUSCULAR; INTRAVENOUS at 17:09

## 2020-07-10 RX ADMIN — HALOPERIDOL LACTATE 0.5 MG: 5 INJECTION, SOLUTION INTRAMUSCULAR at 12:58

## 2020-07-10 RX ADMIN — DONEPEZIL HYDROCHLORIDE 5 MG: 5 TABLET, FILM COATED ORAL at 20:50

## 2020-07-10 RX ADMIN — QUETIAPINE FUMARATE 50 MG: 50 TABLET ORAL at 20:50

## 2020-07-10 RX ADMIN — HALOPERIDOL LACTATE 5 MG: 5 INJECTION, SOLUTION INTRAMUSCULAR at 17:07

## 2020-07-10 RX ADMIN — LORAZEPAM 1 MG: 2 INJECTION INTRAMUSCULAR; INTRAVENOUS at 17:10

## 2020-07-10 ASSESSMENT — PAIN SCALES - GENERAL: PAINLEVEL_OUTOF10: 0

## 2020-07-10 ASSESSMENT — LIFESTYLE VARIABLES: HISTORY_ALCOHOL_USE: NO

## 2020-07-10 NOTE — PROGRESS NOTES
Pt Belongings:    With Patient  Teal Shirt  Jeans x2  Underwear x2  Pink polka dot pj bottoms  White polka dot pj top  Green babar Beverly top  Automatic Data    In [facility] x2  [employer] with polygrip and tape inside  [employer] containing pictures and rewards cards  Pink comb  Tissues  Cloth masks x2  Keys  Glasses chain  Pair of slip on shoes  [employer] signed by [facility] HospitalKARIN MA on 7/10/2020 at 4:25 PM

## 2020-07-10 NOTE — BH NOTE
[facility]  Admission Note     Admission Type:   Admission Type: Involuntary    Reason for admission:  Reason for Admission: Dementia with Behavioral Disturbance     PATIENT STRENGTHS:  Strengths: Positive Support    Patient Strengths and Limitations:  Limitations: Unrealistic self-view, General negative or hopeless attitude about future/recovery    Addictive Behavior:   Addictive Behavior  In the past 3 months, have you felt or has someone told you that you have a problem with:  : (denies at admission)  Do you have a history of Chemical Use?: No  Do you have a history of Alcohol Use?: No  Do you have a history of Street Drug Abuse?: No  Histroy of Prescripton Drug Abuse?: No    Medical Problems:   Past Medical History:   Diagnosis Date    Dilated cardiomyopathy ([email or web address]     Grade I diastolic dysfunction 54/84/3199    Hyperlipidemia     Hypertension     Hypothyroidism     Ischemic cardiomyopathy     Mild aortic insufficiency 05/14/2019    Mild left ventricular hypertrophy 05/14/2019    Mild mitral regurgitation 05/14/2019    Myelodysplastic disease ([facility]     Non-rheumatic mitral regurgitation     [facility]     Subcortical vascular dementia with behavioral disturbance ([email or web address] 07/10/2020       Status EXAM:  Status and Exam  Normal: No  Facial Expression: Hostile  Affect: Blunt, Unstable  Level of Consciousness: Confused  Mood:Normal: No  Mood: Anxious, Angry, Irritable  Motor Activity:Normal: No  Motor Activity: Decreased  Interview Behavior: Uncooperative/Withdrawn  Preception: [place] to Person  Attention:Normal: No  Attention: Others(See comment)(preoccupied with leaving unit)  Thought Processes: Circumstantial  Thought Content: Preoccupations  Hallucinations: None  Delusions: Yes  Delusions:  Other(See Comment)(her mother)  Memory:Normal: No  Memory: Poor Recent, Confabulation  Insight and Judgment: No  Insight and Judgment: Poor Judgment, Poor Insight, Unrealistic  Present Suicidal Ideation: No  Present Homicidal Ideation: No    Tobacco Screening:  Practical Counseling, on admission, john X, if applicable and completed (first 3 are required if patient doesn't refuse):            ( )  Recognizing danger situations (included triggers and roadblocks)                    ( )  Coping skills (new ways to manage stress, exercise, relaxation techniques, changing routine, distraction)                                                           ( )  Basic information about quitting (benefits of quitting, techniques in how to quit, available resources  ( ) Referral for counseling faxed to [facility]                                           ( ) Patient refused counseling  ( ) Patient has not smoked in the last 30 days      Pt admitted with followings belongings:  Dentures: None  Vision - Corrective Lenses: Glasses  Hearing Aid: None  Jewelry: None  Body Piercings Removed: N/A  Clothing: Pants, Shirt, Undergarments (Comment), Footwear, Pajamas  Were All Patient Medications Collected?: Not Applicable  Other Valuables: [address] [place], [address]     [facility] with a diagnosis of Dementia with Behavioral Disturbance. Admitted to room #1052. Patient arrived with limited belongings when arriving from [address] [place] [address]. She arrived via a medical transport on a stretcher. Noted to be confused on admission, arriving at 1520 to the [facility]. Patient oriented to surroundings and program expectations and copy of patient rights given, however patient refused. Patient offered Psychiatric Advanced Directive and refused at this time. Received admission packet, however refused to sign paperwork. Consents reviewed and refused to sign. Patient unable to verbalize understanding at time of admission and began exit seeking. Ambulates with a cane, however it had to be removed from her when she began trying to break windows.   Replaced with a walker. Patient will be maintained on 15 mins safety checks and fall monitoring, (Non-skid socks on). Patient education on precautions, but unable to verbalize understanding.                 Kelley Quintero RN

## 2020-07-10 NOTE — CARE COORDINATION
Reviewed chart and spoke with pt's daughter Charity Hamman, family in agreement with pt going to [facility]. She believes pt will refuse, spoke with Dr Brielle Ramachandran and Pick slip obtained. I then called Dr Tamera López at [facility] [phone]. He approved pt, PS to Dr Brielle Ramachandran, plan to send pt today to [facility] unit. 1100 pt discharged to [facility] ,called Nery Nolan on [facility] and she states Covid results required prior to admission. Orders obtained for Rapid Covid. Called and updated pt's daughter Charity Hamman she is agreeable, she will bring clothes to main entrance to be given to pt prior to leaving and will also  pt's purse. Saji Garibay pt's nurse updated on plans. Set up with [facility] for 1430 per Nancy Damon unit clerk.

## 2020-07-10 NOTE — PROGRESS NOTES
Deandra Obrien ok for patient to be off fluids right now, due to patients agitation.   Will re evaluate later in shift after patient is calmer

## 2020-07-10 NOTE — DISCHARGE SUMMARY
Discharge Summary    Name:  Jose Campuzano /Age/Sex: 10/2/1931  (80 y.o. female)   MRN & CSN:  [id number] & [id number] Admission Date/Time: 2020  3:20 PM   Attending:  Tanya Roach MD Discharging Physician: Cindy Gabriel MD     Hospital Course:   Jose Campuzano is a 80 y.o.  female  who presents with Acute metabolic encephalopathy    Patient was seen and examined  Denied any fever, chills  No chest pain, SOB, palpitations  No new complaint  Seems a bit calm this morning     Assessment and Plan  1) Acute metabolic encephalopathy 2/2 UTI  -CT Head: No acute intracranial abnormality  -UA suggestive of UTI  -Urine culture mixed kin/ urogenital kassidy. No further work up.  -Continue with Cefdinir  -Patient is medically stable to be discharged to [facility] to continue psychiatry management     2) HTN  -Continue amiodarone and Coreg     3) Hypothyroidism  -Continue levothyroxine     4) CAD  -Continue aspirin, beta-blocker     Other Chronic medical conditions; medication resumed unless contraindicated     -Dementia with behavioral disturbance  -Myelodysplastic disease         The patient expressed appropriate understanding of and agreement with the discharge recommendations, medications, and plan. Consults this admission:  IP CONSULT TO HOSPITALIST  IP CONSULT TO PSYCHIATRY  IP CONSULT TO CASE MANAGEMENT    Discharge Instruction:   Follow up appointments:   Primary care physician:  within 2 weeks    Diet:  cardiac diet   Activity: activity as tolerated  Disposition: Discharged to:   [x][facility] []C, []SNF, []Acute Rehab, []Hospice   Condition on discharge: Stable    Discharge Medications:      Judson Regalado   Home Medication Instructions YESSY:[id number]    Printed on:07/10/20 1045   Medication Information                      amiodarone (CORDARONE) 200 MG tablet  Take 1 tablet by mouth daily             aspirin 81 MG tablet  Take 81 mg by mouth daily.              carvedilol (COREG) 25 MG tablet  Take 12.5 mg by mouth 2 times daily (with meals)              cefdinir (OMNICEF) 300 MG capsule  Take 1 capsule by mouth 2 times daily for 5 days             docusate sodium (COLACE, DULCOLAX) 100 MG CAPS  Take 100 mg by mouth 2 times daily             levothyroxine (SYNTHROID) 75 MCG tablet  Take 75 mcg by mouth Daily. losartan (COZAAR) 50 MG tablet  Take 50 mg by mouth daily             MAGNESIUM-ZINC PO  Take 1 tablet by mouth daily. QUEtiapine (SEROQUEL) 25 MG tablet  Take 50 mg by mouth nightly             vitamin B-12 (CYANOCOBALAMIN) 1000 MCG tablet  Take 1,000 mcg by mouth daily                 Objective Findings at Discharge:   BP (!) 147/81   Pulse 81   Temp 98.1 °F (36.7 °C) (Axillary)   Resp 16   Ht 4' 11.5\" (1.511 m)   Wt 120 lb (54.4 kg)   SpO2 95%   BMI 23.83 kg/m²            PHYSICAL EXAM   GEN Awake female, sitting upright in bed in no apparent distress. Appears given age. EYES Pupils are equally round. No scleral erythema, discharge, or conjunctivitis. HENT Mucous membranes are moist. Oral pharynx without exudates, no evidence of thrush. NECK Supple, no apparent thyromegaly or masses. RESP Clear to auscultation, no wheezes, rales or rhonchi. Symmetric chest movement while on room air. CARDIO/VASC S1/S2 auscultated. Regular rate without appreciable murmurs, rubs, or gallops. No JVD or carotid bruits. Peripheral pulses equal bilaterally and palpable. No peripheral edema. GI Abdomen is soft without significant tenderness, masses, or guarding. Bowel sounds are normoactive. Rectal exam deferred.  No costovertebral angle tenderness. Normal appearing external genitalia. Armenta catheter is not present. HEME/LYMPH No palpable cervical lymphadenopathy and no hepatosplenomegaly. No petechiae or ecchymoses. MSK No gross joint deformities. SKIN Normal coloration, warm, dry. NEURO No focal deficit  PSYCH Awake, alert. Affect appropriate.     BMP/CBC  Recent Labs

## 2020-07-10 NOTE — PROGRESS NOTES
Kajal Felicerajendra, gave purse to daughter due to pt being transferred to [facility]. Her clothes were also received from daughter for her to travel with.

## 2020-07-10 NOTE — BH NOTE
Patient continued to escalate with agitated behaviors. Exit seeking and attempts to break windows of unit with cane. PRN Ativan 1 mg IM, Haldol 5 mg IM and Benadryl 25 mg IM given in her right quadrant. Receptive to staff reassurance. Injection given without difficulties.

## 2020-07-10 NOTE — PROGRESS NOTES
Pt seen by therapist from approximately [phone] to complete pt's recreation/leisure assessment. When asked about reason for admission, pt states \"I don't know. \"  Pt denies any mental health needs. Pt noted to have limited insight into leisure and coping skills. Therapist encouraged pt to attend groups.      Electronically signed by KARIN Chavis MA on 7/10/2020 at 4:48 PM

## 2020-07-11 LAB
ALBUMIN SERPL-MCNC: 3.7 GM/DL (ref 3.4–5)
ALP BLD-CCNC: 78 IU/L (ref 40–129)
ALT SERPL-CCNC: 12 U/L (ref 10–40)
AMMONIA: 32 UMOL/L (ref 11–51)
ANION GAP SERPL CALCULATED.3IONS-SCNC: 13 MMOL/L (ref 4–16)
AST SERPL-CCNC: 29 IU/L (ref 15–37)
BASOPHILS ABSOLUTE: 0 K/CU MM
BASOPHILS RELATIVE PERCENT: 0.9 % (ref 0–1)
BILIRUB SERPL-MCNC: 1.2 MG/DL (ref 0–1)
BUN BLDV-MCNC: 21 MG/DL (ref 6–23)
CALCIUM SERPL-MCNC: 9 MG/DL (ref 8.3–10.6)
CHLORIDE BLD-SCNC: 102 MMOL/L (ref 99–110)
CHOLESTEROL: 166 MG/DL
CO2: 25 MMOL/L (ref 21–32)
CREAT SERPL-MCNC: 0.8 MG/DL (ref 0.6–1.1)
DIFFERENTIAL TYPE: ABNORMAL
EOSINOPHILS ABSOLUTE: 0.2 K/CU MM
EOSINOPHILS RELATIVE PERCENT: 4.7 % (ref 0–3)
ESTIMATED AVERAGE GLUCOSE: 111 MG/DL
GFR AFRICAN AMERICAN: >60 ML/MIN/1.73M2
GFR NON-AFRICAN AMERICAN: >60 ML/MIN/1.73M2
GLUCOSE BLD-MCNC: 72 MG/DL (ref 70–99)
HBA1C MFR BLD: 5.5 % (ref 4.2–6.3)
HCT VFR BLD CALC: 34.6 % (ref 37–47)
HDLC SERPL-MCNC: 63 MG/DL
HEMOGLOBIN: 10.5 GM/DL (ref 12.5–16)
IMMATURE NEUTROPHIL %: 0.2 % (ref 0–0.43)
LDL CHOLESTEROL DIRECT: 86 MG/DL
LYMPHOCYTES ABSOLUTE: 1 K/CU MM
LYMPHOCYTES RELATIVE PERCENT: 22.1 % (ref 24–44)
MCH RBC QN AUTO: 27.7 PG (ref 27–31)
MCHC RBC AUTO-ENTMCNC: 30.3 % (ref 32–36)
MCV RBC AUTO: 91.3 FL (ref 78–100)
MONOCYTES ABSOLUTE: 0.6 K/CU MM
MONOCYTES RELATIVE PERCENT: 13.7 % (ref 0–4)
PDW BLD-RTO: 14.9 % (ref 11.7–14.9)
PLATELET # BLD: 218 K/CU MM (ref 140–440)
PMV BLD AUTO: 8.8 FL (ref 7.5–11.1)
POTASSIUM SERPL-SCNC: 3.8 MMOL/L (ref 3.5–5.1)
RBC # BLD: 3.79 M/CU MM (ref 4.2–5.4)
SEGMENTED NEUTROPHILS ABSOLUTE COUNT: 2.6 K/CU MM
SEGMENTED NEUTROPHILS RELATIVE PERCENT: 58.4 % (ref 36–66)
SODIUM BLD-SCNC: 140 MMOL/L (ref 135–145)
TOTAL IMMATURE NEUTOROPHIL: 0.01 K/CU MM
TOTAL PROTEIN: 6.6 GM/DL (ref 6.4–8.2)
TRIGL SERPL-MCNC: 71 MG/DL
VITAMIN D 25-HYDROXY: 34.08 NG/ML
WBC # BLD: 4.4 K/CU MM (ref 4–10.5)

## 2020-07-11 PROCEDURE — 83036 HEMOGLOBIN GLYCOSYLATED A1C: CPT

## 2020-07-11 PROCEDURE — 83721 ASSAY OF BLOOD LIPOPROTEIN: CPT

## 2020-07-11 PROCEDURE — 36415 COLL VENOUS BLD VENIPUNCTURE: CPT

## 2020-07-11 PROCEDURE — 1240000000 HC EMOTIONAL WELLNESS R&B

## 2020-07-11 PROCEDURE — 90792 PSYCH DIAG EVAL W/MED SRVCS: CPT | Performed by: NURSE PRACTITIONER

## 2020-07-11 PROCEDURE — 85025 COMPLETE CBC W/AUTO DIFF WBC: CPT

## 2020-07-11 PROCEDURE — 80053 COMPREHEN METABOLIC PANEL: CPT

## 2020-07-11 PROCEDURE — 6360000002 HC RX W HCPCS: Performed by: NURSE PRACTITIONER

## 2020-07-11 PROCEDURE — 82140 ASSAY OF AMMONIA: CPT

## 2020-07-11 PROCEDURE — 80061 LIPID PANEL: CPT

## 2020-07-11 PROCEDURE — 82306 VITAMIN D 25 HYDROXY: CPT

## 2020-07-11 PROCEDURE — 6370000000 HC RX 637 (ALT 250 FOR IP): Performed by: NURSE PRACTITIONER

## 2020-07-11 RX ORDER — CEFDINIR 300 MG/1
300 CAPSULE ORAL 2 TIMES DAILY
Status: DISCONTINUED | OUTPATIENT
Start: 2020-07-11 | End: 2020-07-19 | Stop reason: HOSPADM

## 2020-07-11 RX ADMIN — HALOPERIDOL LACTATE 5 MG: 5 INJECTION, SOLUTION INTRAMUSCULAR at 21:23

## 2020-07-11 RX ADMIN — AMIODARONE HYDROCHLORIDE 200 MG: 200 TABLET ORAL at 08:12

## 2020-07-11 RX ADMIN — Medication 200 MG: at 08:12

## 2020-07-11 RX ADMIN — LOSARTAN POTASSIUM 50 MG: 50 TABLET ORAL at 08:12

## 2020-07-11 RX ADMIN — CEFDINIR 300 MG: 300 CAPSULE ORAL at 20:07

## 2020-07-11 RX ADMIN — ASPIRIN 81 MG: 81 TABLET, COATED ORAL at 08:12

## 2020-07-11 RX ADMIN — DOCUSATE SODIUM 100 MG: 100 CAPSULE, LIQUID FILLED ORAL at 08:12

## 2020-07-11 RX ADMIN — TRAZODONE HYDROCHLORIDE 50 MG: 50 TABLET ORAL at 20:07

## 2020-07-11 RX ADMIN — QUETIAPINE FUMARATE 50 MG: 50 TABLET ORAL at 20:07

## 2020-07-11 RX ADMIN — HALOPERIDOL 5 MG: 5 TABLET ORAL at 13:27

## 2020-07-11 RX ADMIN — CARVEDILOL 12.5 MG: 6.25 TABLET, FILM COATED ORAL at 08:12

## 2020-07-11 RX ADMIN — CEFDINIR 300 MG: 300 CAPSULE ORAL at 13:27

## 2020-07-11 RX ADMIN — DONEPEZIL HYDROCHLORIDE 5 MG: 5 TABLET, FILM COATED ORAL at 20:07

## 2020-07-11 RX ADMIN — LEVOTHYROXINE SODIUM 75 MCG: 0.07 TABLET ORAL at 06:26

## 2020-07-11 RX ADMIN — CYANOCOBALAMIN TAB 1000 MCG 1000 MCG: 1000 TAB at 08:12

## 2020-07-11 ASSESSMENT — PAIN SCALES - GENERAL: PAINLEVEL_OUTOF10: 0

## 2020-07-11 NOTE — PLAN OF CARE
[facility]  Initial Interdisciplinary Treatment Plan NOTE    Review Date & Time: 07/11/2020 2:30pm    Admission Type:   Admission Type:  Involuntary    Reason for admission:  Reason for Admission: Dementia with Behavioral Disturbance     Patient Admission Diagnosis: Subcortical vascular dementia with behavioral disturbance ([facility]    PATIENT STRENGTHS:  Patient Strengths Strengths: Positive Support  Patient Strengths and Limitations:Limitations: Unrealistic self-view, General negative or hopeless attitude about future/recovery  Addictive Behavior:Addictive Behavior  In the past 3 months, have you felt or has someone told you that you have a problem with:  : (denies at admission)  Do you have a history of Chemical Use?: No  Do you have a history of Alcohol Use?: No  Do you have a history of Street Drug Abuse?: No  Histroy of Prescripton Drug Abuse?: No  Medical Problems:  Past Medical History:   Diagnosis Date    Dilated cardiomyopathy ([facility]     Grade I diastolic dysfunction 42/99/3515    Hyperlipidemia     Hypertension     Hypothyroidism     Ischemic cardiomyopathy     Mild aortic insufficiency 05/14/2019    Mild left ventricular hypertrophy 05/14/2019    Mild mitral regurgitation 05/14/2019    Myelodysplastic disease ([facility]     Non-rheumatic mitral regurgitation     PAF (paroxysmal atrial fibrillation) ([facility]     Subcortical vascular dementia with behavioral disturbance ([facility] 07/10/2020     EDUCATION:   Learner Progress Toward Treatment Goals: Reviewed results and recommendations of this team    Method: Group    Outcome: Verbalized understanding    PATIENT GOALS: Med titration, orientation and compliance with unit programming    PLAN/TREATMENT RECOMMENDATIONS UPDATE: Med titration    Estimated Length of Stay Update:  7 days  Estimated Discharge Date Update: 07/16/2020  Discharge Criteria: Med titration     SHORT-TERM GOALS UPDATE:   Time frame for Short-Term Goals: 7 days    LONG-TERM GOALS UPDATE:   Time frame for Long-Term Goals: 7 days  Members Present in Team Meeting: See Signature [id number] MS LyndsayW, LSW

## 2020-07-11 NOTE — H&P
Initial Psychiatric History and Physical    Kaylah Most Derick Kumar  [id number]  7/10/2020  07/11/20    ID: Patient is a 80 yrs y.o. female    CC: \"I don't know. \"    HPI: HPI:  Akira Cortez is an 80year old  female with PMH of dementia, depression, HTN, Hypothyroidism, CAD and myelodysplastic disease. Patient presented to [facility] ED after becoming aggressive at home. Per ED notes patient attempted to break windows at home with her cane, threatened suicide and homicide. Psychiatry was consulted by Homa Rosario CNP for suicide and homicide attempt.     During today's interview the patient is alert and oriented to self only. She is not aware of why she is in the hospital. She denies SI/HI. Denies auditory and visual hallucinations. Denies depression or anxiety. States she lives alone and her daughters help her out at home because they \"want to not that she needs it. \" She states she is sleeping \"OK\" but that her appetite is \"poor. \" During the interview, patient wanted to make sure the door to the room was open \"so she can see me. \"    Pt noted she currently feels safe and comfortable on the unit. Pt was in agreement with treatment team.  Pt was polite and cordial during the interview process.       Patient sees a neurologist in [facility] and started the patient on Seroquel for anxiety. Patient noted to talk to people who are not there and sees others who are not there. She has been agitated, threatening suicide and homicide prior to UTI but after UTI it became unmanageable. Patient has recently become paranoid and guarded. Pt denied any hx of seizures, TBIs, Hep C or HIV  No TD noted, AIMS=0    Pt denied hx of previous inpt psychiatric admissions  Pt denied any previous suicide attempts  Pt denied any family hx of suicides  Pt denied any family mental health hx  Pt denied any hx of abuse trauma or neglect.       Alcohol: denies any current  Street drugs: denies any current  Tobacco: none  Caffeine: 2 C tea per day    Past Psychiatric History:   See note above    Family Psychiatric History:   See note above    Family Psychiatric History:   History reviewed. No pertinent family history. Allergies:   Allergies   Allergen Reactions    Ibuprofen Other (See Comments)     Unknown     Pcn [Penicillins]         OBJECTIVE  Vital Signs:  Vitals:    07/11/20 0810   BP: 135/79   Pulse: 86   Resp: 16   Temp: 98.5 °F (36.9 °C)   SpO2: 94%       Labs:  Recent Results (from the past 48 hour(s))   COVID-19    Collection Time: 07/10/20 11:07 AM    Specimen: Nasopharyngeal Swab   Result Value Ref Range    Source THROAT     SARS-CoV-2, NAAT NOT DETECTED    Hemoglobin A1c    Collection Time: 07/11/20  6:40 AM   Result Value Ref Range    Hemoglobin A1C 5.5 4.2 - 6.3 %    eAG 111 mg/dL   Lipid panel - fasting    Collection Time: 07/11/20  6:40 AM   Result Value Ref Range    Triglycerides 71 <150 MG/DL    Cholesterol 166 <200 MG/DL    HDL 63 >40 MG/DL    LDL Direct 86 <100 MG/DL   CBC auto differential    Collection Time: 07/11/20  6:40 AM   Result Value Ref Range    WBC 4.4 4.0 - 10.5 K/CU MM    RBC 3.79 (L) 4.2 - 5.4 M/CU MM    Hemoglobin 10.5 (L) 12.5 - 16.0 GM/DL    Hematocrit 34.6 (L) 37 - 47 %    MCV 91.3 78 - 100 FL    MCH 27.7 27 - 31 PG    MCHC 30.3 (L) 32.0 - 36.0 %    RDW 14.9 11.7 - 14.9 %    Platelets 656 905 - 290 K/CU MM    MPV 8.8 7.5 - 11.1 FL    Differential Type AUTOMATED DIFFERENTIAL     Segs Relative 58.4 36 - 66 %    Lymphocytes % 22.1 (L) 24 - 44 %    Monocytes % 13.7 (H) 0 - 4 %    Eosinophils % 4.7 (H) 0 - 3 %    Basophils % 0.9 0 - 1 %    Segs Absolute 2.6 K/CU MM    Lymphocytes Absolute 1.0 K/CU MM    Monocytes Absolute 0.6 K/CU MM    Eosinophils Absolute 0.2 K/CU MM    Basophils Absolute 0.0 K/CU MM    Immature Neutrophil % 0.2 0 - 0.43 %    Total Immature Neutrophil 0.01 K/CU MM   Ammonia    Collection Time: 07/11/20  6:40 AM   Result Value Ref Range    Ammonia 32 11 - 51 UMOL/L   Comprehensive Metabolic Panel w/ Reflex to MG    Collection Time: 07/11/20  6:40 AM   Result Value Ref Range    Sodium 140 135 - 145 MMOL/L    Potassium 3.8 3.5 - 5.1 MMOL/L    Chloride 102 99 - 110 mMol/L    CO2 25 21 - 32 MMOL/L    BUN 21 6 - 23 MG/DL    CREATININE 0.8 0.6 - 1.1 MG/DL    Glucose 72 70 - 99 MG/DL    Calcium 9.0 8.3 - 10.6 MG/DL    Alb 3.7 3.4 - 5.0 GM/DL    Total Protein 6.6 6.4 - 8.2 GM/DL    Total Bilirubin 1.2 (H) 0.0 - 1.0 MG/DL    ALT 12 10 - 40 U/L    AST 29 15 - 37 IU/L    Alkaline Phosphatase 78 40 - 129 IU/L    GFR Non-African American >60 >60 mL/min/1.73m2    GFR African American >60 >60 mL/min/1.73m2    Anion Gap 13 4 - 16   Vitamin D 25 hydroxy    Collection Time: 07/11/20  6:40 AM   Result Value Ref Range    Vit D, 25-Hydroxy 34.08 >20 NG/ML       Review of Systems:  Reports of no current cardiovascular, respiratory, gastrointestinal, genitourinary, integumentary, neurological, musculoskeletal, or immunological symptoms today. PSYCHIATRIC: See HPI above. Neurologic examination    Mental status: The patient is alert, attentive, and oriented. Speech is clear and fluent with good repetition, comprehension, and naming. Cranial nerves:    CN II: Visual fields are full to confrontation. Pupils are 3 mm and briskly reactive to light. CN III, IV, VI: At primary gaze, there is no eye deviation. EOMs intact. CN V: Facial sensation is intact to pinprick in all 3 divisions bilaterally. Corneal responses are intact. CN VII: Face is symmetric with normal eye closure and smile. CN VII: Hearing decreased on the right but normal on the left to rubbing fingers    CN IX, X: Palate elevates symmetrically. Phonation is normal.    CN XI: Head turning and shoulder shrug are intact    CN XII: Tongue is midline with normal movements and no atrophy. Motor: There is no pronator drift of out-stretched arms. Muscle bulk and tone are normal. Strength is decreased in legs bilaterally.     Reflexes:  Reflexes are 2+ and symmetric at the biceps, triceps, knees, and ankles. Plantar responses are flexor. Sensory:  Light touch, pinprick, position sense, and vibration sense are intact in fingers    Coordination:  Rapid alternating movements and fine finger movements are intact. There is no dysmetria on finger-to-nose and heel-knee-shin. There are no abnormal or extraneous movements. Romberg is absent. Gait/Stance:  Posture is normal. Gait is unsteady with shuffling steps. Stride significantly diminished. PSYCHIATRIC EXAMINATION / MENTAL STATUS EXAM    CONSTITUTIONAL:    Vitals:   Vitals:    07/11/20 0810   BP: 135/79   Pulse: 86   Resp: 16   Temp: 98.5 °F (36.9 °C)   SpO2: 94%      General appearance: [x] appears age, []  appears older than stated age,               [x]  adequately dressed and groomed, [] disheveled,               [x]  in no acute distress, [] appears mildly distressed, [] other           MUSCULOSKELETAL:   Gait:   [] normal, [] antalgic, [] unsteady, [] gait not evaluated   Station:             [] erect, [] sitting, [] recumbent, [] other        Strength/tone:  [x] muscle strength and tone appear consistent with age and                                        condition     [] atrophy      [] abnormal movements  PSYCHIATRIC:    Appearance: appears stated age. alert and oriented to person, place, time & situation. no acute distress. Adequate grooming and hygiene. Good eye contact. No prominent physical abnormalities. Attitude: Manner is cooperative and pleasant  Motor: No psychomotor agitation, retardation or abnormal movements noted  Speech: Clearly articulated; normal rate, volume, tone & amount. Language: intact understanding and production  Mood:  Affect: euthymic, full range, non-labile, congruent with mood and content of speech  Thought Production: Spontaneous. Thought Form: Coherent, linear, logical & goal-directed. No tangentiality or circumstantiality.  No flight of ideas or loosening of to continue therapy group or individual.  23. Pt was in agreement with treatment plan. 24. The risks benefits and side effects of medications were discussed with the patient, including alternatives and no treatment.     Electronically signed by CELI England CNP on 7/11/2020 at 2:00 PM

## 2020-07-11 NOTE — CARE COORDINATION
DISCHARGE BARRIERS     Reason for Referral: [facility] initial assessment  Mental Status: Oriented  Decision Making Ability: Yes  Family/Social/Home Environment: spoke with patient who states the patient currently resides with alone in a private residence. Daughters are with patient 24hours a day. Patient reported family as support systems in place. Current Services/ Equipment: Cane  Patient Income source: Patient receives SSI and reported that expenses exceeds income. Concerns or Barriers to Discharge: no  Patient and/or family verbalized understanding of the plan of care and contributed to goal setting. Social/ Functional History  Lives With: Alone  Type of Home: House  Home Layout:  one level  Home Access: stairs with rails  Bathroom Shower/ Tub: Shower/Tub unit  Bathroom Toilet: Standard  Other Bathroom Assistive Devices: shower chair  Home Equipment: Cane  ADL Assistance: Independent  Homemaking Assistance: Independent (daughters assist)  Ambulation Assistance: Independent with a cane  Transfer Assistance: Independent  Additional Comments: Patient has 2 daughters that are with her 24hours a day. They rotate and manage her medications.      Anticipated Needs per Patient:   Potential Assistance Needed: [facility] or [facility] access hospital  Type of Home Care Services: None  Patient expects to be discharged to: Home     Discharge Plan:  [place] [address] [place]

## 2020-07-11 NOTE — PROGRESS NOTES
Pt alert to self only, cooperative with am meds crushed and placed in applesauce. Pt tolerating meals and fluids w/o difficulty. Pt has been continent during the shift and has been out on the unit speaking with other pts. Appeared to get increasingly agitated at this time evident by pacing and stating she needed to get out of here to go home. Pt redirectable at this time and was given PRN PO Haldol 5 mg to decrease the agitation. Pt at first refused meds, ATB and Haldol, but then was coaxed into taking. Pt denies anxiety, depression, SI/HI or AVH. Consent (Ear)/Introductory Paragraph: The rationale for Mohs was explained to the patient and consent was obtained. The risks, benefits and alternatives to therapy were discussed in detail. Specifically, the risks of ear deformity, infection, scarring, bleeding, prolonged wound healing, incomplete removal, allergy to anesthesia, nerve injury and recurrence were addressed. Prior to the procedure, the treatment site was clearly identified and confirmed by the patient. All components of Universal Protocol/PAUSE Rule completed.

## 2020-07-11 NOTE — GROUP NOTE
Group Therapy Note    Date: 7/11/2020    Group Start Time: 2240  Group End Time: 1200  Group Topic: Recreational    [address], [address]    Group Therapy Note    Attendees: 4/5       Patient's Goal: \"I don't know if I am going to be here today\"    Notes: Patient participated in group. Open discussion with peers about music and mental health. Patient listened to songs picked by the group.      Status After Intervention:  Improved    Participation Level: Interactive    Participation Quality: Appropriate    Speech:  normal    Thought Process/Content: Linear    Affective Functioning: Flat    Mood: elevated    Level of consciousness:  Alert and Attentive    Response to Learning: Able to verbalize current knowledge/experience    Endings: None Reported    Modes of Intervention: Socialization, Exploration and Activity    Discipline Responsible: /Counselor    Signature: STEFAN Alan LSW

## 2020-07-11 NOTE — CONSULTS
Hospitalist Consult Note      Name:  Sebastian Jewell /Age/Sex: 10/2/1931  (80 y.o. female)   MRN & CSN:  [id number] & [id number] Admission Date/Time: 7/10/2020  3:35 PM   Location:  [facility]/1052- PCP: Omar Finch MD       Hospital Day: 2    Assessment and Plan:   Sebastian Jewell is a 80 y.o.  female  who presents with Subcortical vascular dementia with behavioral disturbance [facility])  Hospitalist Team consulted for: Medical Management     1. Dementia with behavioral disturbances managed by primary team  2. Recent UTI continue on cefdinir  3. Hypertension continue on amiodarone and Coreg  4. Hypothyroidism continue levothyroxine  5. Coronary disease continue on aspirin and beta-blocker    Diet DIET GENERAL;   DVT Prophylaxis [] Lovenox, []  Heparin, [] SCDs, [] Ambulation   GI Prophylaxis [] PPI,  [] H2 Blocker,  [] Carafate,  [] Diet/Tube Feeds   Code Status Full Code   Disposition Patient requires continued admission due to    MDM [] Low, [] Moderate,[]  High  Patient's risk as above due to      History of Present Illness:     80years old female with history of dementia history of hypertension hypothyroidism chronic artery disease , admitted to [facility] for the management of advanced dementia with behavioral disturbances. Hospitalist service consulted for the medical management  Patient was admitted recently to [facility] for the management of acute metabolic encephalopathy which was secondary to UTI, urine culture growing mixed urogenital kassidy, switched to cefdinir. Patient currently has no symptoms or complaint denies chest pain or shortness of breath no urinary symptoms no nausea vomiting or change in bowel movement    Ten point ROS reviewed negative, unless as noted above    Objective:        Intake/Output Summary (Last 24 hours) at 2020 1012  Last data filed at 2020 0810  Gross per 24 hour   Intake 60 ml   Output --   Net 60 ml      Vitals:   Vitals:    20 0810   BP: 135/79 Pulse: 86   Resp: 16   Temp: 98.5 °F (36.9 °C)   SpO2: 94%     Physical Exam:   GEN    Awake female, sitting upright in bed in no apparent distress. Appears given age. EYES   Pupils are equally round. No scleral erythema, discharge, or conjunctivitis. HENT  Mucous membranes are moist. Oral pharynx without exudates, no evidence of thrush. NECK  Supple, no apparent thyromegaly or masses. RESP  Clear to auscultation, no wheezes, rales or rhonchi. Symmetric chest movement while on room air. CARDIO/VASC           S1/S2 auscultated. Regular rate without appreciable murmurs, rubs, or gallops. No JVD or carotid bruits. Peripheral pulses equal bilaterally and palpable. No peripheral edema. GI        Abdomen is soft without significant tenderness, masses, or guarding. Bowel sounds are normoactive. Rectal exam deferred. MSK    No gross joint deformities. SKIN    Normal coloration, warm, dry. NEURO           No focal deficit  PSYCH            Awake, alert. Affect appropriate.     Medications:   Medications:    amiodarone  200 mg Oral Daily    aspirin  81 mg Oral Daily    carvedilol  12.5 mg Oral BID WC    cefdinir  300 mg Oral BID    docusate sodium  100 mg Oral BID    levothyroxine  75 mcg Oral Daily    losartan  50 mg Oral Daily    magnesium oxide  200 mg Oral Daily    QUEtiapine  50 mg Oral Nightly    vitamin B-12  1,000 mcg Oral Daily    donepezil  5 mg Oral Nightly      Infusions:   PRN Meds: diphenhydrAMINE, 25 mg, Q4H PRN  acetaminophen, 325 mg, Q4H PRN  traZODone, 50 mg, Nightly PRN  polyethylene glycol, 17 g, Daily PRN  LORazepam, 1 mg, Q4H PRN    Or  LORazepam, 1 mg, Q4H PRN  haloperidol lactate, 5 mg, Q4H PRN    Or  haloperidol, 5 mg, Q4H PRN  acetaminophen, 500 mg, Q4H PRN  acetaminophen, 650 mg, Q4H PRN          Electronically signed by David Perez MD on 7/11/2020 at 10:12 AM

## 2020-07-11 NOTE — PLAN OF CARE
Problem: Falls - Risk of:  Goal: Will remain free from falls  Description: Will remain free from falls  Outcome: Ongoing  Goal: Absence of physical injury  Description: Absence of physical injury  Outcome: Ongoing     Problem: Anger Management/Homicidal Ideation:  Goal: Able to display appropriate communication and problem solving  Description: Able to display appropriate communication and problem solving  Outcome: Ongoing  Goal: Ability to verbalize frustrations and anger appropriately will improve  Description: Ability to verbalize frustrations and anger appropriately will improve  Outcome: Ongoing  Goal: Absence of angry outbursts  Description: Absence of angry outbursts  Outcome: Ongoing     Problem: Altered Mood, Depressive Behavior:  Goal: Able to verbalize and/or display a decrease in depressive symptoms  Description: Able to verbalize and/or display a decrease in depressive symptoms  Outcome: Ongoing     Problem: Altered Mood, Deterioration in Function:  Goal: Maintenance of adequate nutrition will improve  Description: Maintenance of adequate nutrition will improve  Outcome: Ongoing     Problem: Altered Mood, Manic Behavior:  Goal: Able to sleep  Description: Able to sleep  Outcome: Ongoing  Goal: Mood stable  Description: Mood stable  Outcome: Ongoing     Problem: Skin Integrity:  Goal: Will show no infection signs and symptoms  Description: Will show no infection signs and symptoms  Outcome: Ongoing  Goal: Absence of new skin breakdown  Description: Absence of new skin breakdown  Outcome: Ongoing

## 2020-07-11 NOTE — PROGRESS NOTES
Pt's daughter, Brigido Subramanian, called to check on pt. Per Brigido Subramanian either herself or sister Nithya stay with pt at all times. Brigido Subramanian stated that pt has Macular Degeneration and may need assistance due to that. Brandond [place] that staff would help pt as needed.

## 2020-07-11 NOTE — GROUP NOTE
Group Therapy Note    Date: 7/11/2020    Group Start Time: 1330  Group End Time: 1400  Group Topic: Psychoeducation    [address]    STEFAN Gray, GUANAKO    Group Therapy Note    Attendees: 3/5       Patient's Goal: \"I don't know if I am going to be here today\"    Notes: Patient participated in group. Open discussion with peers about socialization and mental health. Participated in a game of TuneCore with peers.      Status After Intervention:  Improved    Participation Level: Interactive    Participation Quality: Appropriate    Speech:  normal    Thought Process/Content: Linear    Affective Functioning: Congruent    Mood: anxious    Level of consciousness:  Alert and Attentive    Response to Learning: Able to verbalize current knowledge/experience    Endings: None Reported    Modes of Intervention: Socialization and Activity    Discipline Responsible: /Counselor    Signature: STEFAN Gray, GUANAKO

## 2020-07-11 NOTE — PROGRESS NOTES
Pt resting in bed for assessment. Oriented to person only. Unable to anwser questions appropriately. Did take medications crushed in applesauce.

## 2020-07-11 NOTE — GROUP NOTE
Group Therapy Note    Date: 7/11/2020    Group Start Time: 0830  Group End Time: 0930  Group Topic: Community Meeting/ AM [address] [facility] [address]    STEFAN Barrera, ELLIEW    Group Therapy Note    Attendees: 5/5       Patient's Goal: \"I don't know if I am going to be here today\"    Notes: Patient participated in group. Patient stated that she was feeling \"okay\". Patient reported getting \"quite a few\" hours of restful sleep last night. Patient was unable to rate on a scale of 0 to 10, depression (no), anxiety (no), irritability/agitation (I don't know). Reviewed goal, meal selection, and unit schedule for the day.      Status After Intervention:  Unchanged    Participation Level: Interactive    Participation Quality: Appropriate    Speech:  normal    Thought Process/Content: Confusion Present    Affective Functioning: Flat    Mood: anxious    Level of consciousness:  Alert and Attentive    Response to Learning: Able to verbalize current knowledge/experience    Endings: None Reported    Modes of Intervention: Exploration and Clarifying    Discipline Responsible: /Counselor    Signature: STEFAN Barrera, GUANAKO

## 2020-07-11 NOTE — PLAN OF CARE
Problem: Falls - Risk of:  Goal: Will remain free from falls  Description: Will remain free from falls  7/11/2020 1057 by Oliva Jose RN  Outcome: Ongoing  7/11/2020 0438 by Clay Hernandez RN  Outcome: Ongoing  Goal: Absence of physical injury  Description: Absence of physical injury  7/11/2020 1057 by Oliva Jose RN  Outcome: Ongoing  7/11/2020 0438 by Clay Hernandez RN  Outcome: Ongoing     Problem: Anger Management/Homicidal Ideation:  Goal: Able to display appropriate communication and problem solving  Description: Able to display appropriate communication and problem solving  7/11/2020 1057 by Oliva Jose RN  Outcome: Ongoing  7/11/2020 0438 by Clay Hernandez RN  Outcome: Ongoing  Goal: Ability to verbalize frustrations and anger appropriately will improve  Description: Ability to verbalize frustrations and anger appropriately will improve  7/11/2020 1057 by Oliva Jose RN  Outcome: Ongoing  7/11/2020 0438 by Clay Hernandez RN  Outcome: Ongoing  Goal: Absence of angry outbursts  Description: Absence of angry outbursts  7/11/2020 1057 by Oliva Jose RN  Outcome: Ongoing  7/11/2020 0438 by Clay Hernandez RN  Outcome: Ongoing     Problem: Altered Mood, Depressive Behavior:  Goal: Able to verbalize and/or display a decrease in depressive symptoms  Description: Able to verbalize and/or display a decrease in depressive symptoms  7/11/2020 1057 by Oliva Jose RN  Outcome: Ongoing  7/11/2020 0438 by Clay Hernandez RN  Outcome: Ongoing     Problem: Altered Mood, Deterioration in Function:  Goal: Maintenance of adequate nutrition will improve  Description: Maintenance of adequate nutrition will improve  7/11/2020 1057 by Oliva Jose RN  Outcome: Ongoing  7/11/2020 0438 by Clay Hernandez RN  Outcome: Ongoing     Problem: Altered Mood, Manic Behavior:  Goal: Able to sleep  Description: Able to sleep  7/11/2020 1057 by Oliva Jose RN  Outcome: Ongoing  7/11/2020 0438 by Corbin Schilder, RN  Outcome: Ongoing  Goal: Mood stable  Description: Mood stable  7/11/2020 1057 by Ninfa Knapp RN  Outcome: Ongoing  7/11/2020 0438 by Corbin Schilder, RN  Outcome: Ongoing     Problem: Skin Integrity:  Goal: Will show no infection signs and symptoms  Description: Will show no infection signs and symptoms  7/11/2020 1057 by Ninfa Knapp RN  Outcome: Ongoing  7/11/2020 0438 by Corbin Schilder, RN  Outcome: Ongoing  Goal: Absence of new skin breakdown  Description: Absence of new skin breakdown  7/11/2020 1057 by Ninfa Knapp RN  Outcome: Ongoing  7/11/2020 0438 by Corbin Schilder, RN  Outcome: Ongoing

## 2020-07-12 PROCEDURE — 1240000000 HC EMOTIONAL WELLNESS R&B

## 2020-07-12 PROCEDURE — 6370000000 HC RX 637 (ALT 250 FOR IP): Performed by: NURSE PRACTITIONER

## 2020-07-12 PROCEDURE — 99233 SBSQ HOSP IP/OBS HIGH 50: CPT | Performed by: NURSE PRACTITIONER

## 2020-07-12 RX ORDER — DIPHENHYDRAMINE HCL 25 MG
25 CAPSULE ORAL EVERY 4 HOURS PRN
Status: DISCONTINUED | OUTPATIENT
Start: 2020-07-12 | End: 2020-07-13 | Stop reason: ALTCHOICE

## 2020-07-12 RX ADMIN — DOCUSATE SODIUM 100 MG: 100 CAPSULE, LIQUID FILLED ORAL at 20:45

## 2020-07-12 RX ADMIN — DOCUSATE SODIUM 100 MG: 100 CAPSULE, LIQUID FILLED ORAL at 09:39

## 2020-07-12 RX ADMIN — LOSARTAN POTASSIUM 50 MG: 50 TABLET ORAL at 09:39

## 2020-07-12 RX ADMIN — QUETIAPINE FUMARATE 50 MG: 50 TABLET ORAL at 20:45

## 2020-07-12 RX ADMIN — CEFDINIR 300 MG: 300 CAPSULE ORAL at 20:45

## 2020-07-12 RX ADMIN — CYANOCOBALAMIN TAB 1000 MCG 1000 MCG: 1000 TAB at 09:39

## 2020-07-12 RX ADMIN — DIPHENHYDRAMINE HYDROCHLORIDE 25 MG: 25 CAPSULE ORAL at 16:33

## 2020-07-12 RX ADMIN — AMIODARONE HYDROCHLORIDE 200 MG: 200 TABLET ORAL at 09:39

## 2020-07-12 RX ADMIN — TRAZODONE HYDROCHLORIDE 50 MG: 50 TABLET ORAL at 20:45

## 2020-07-12 RX ADMIN — ASPIRIN 81 MG: 81 TABLET, COATED ORAL at 09:39

## 2020-07-12 RX ADMIN — LEVOTHYROXINE SODIUM 75 MCG: 0.07 TABLET ORAL at 06:42

## 2020-07-12 RX ADMIN — CARVEDILOL 12.5 MG: 6.25 TABLET, FILM COATED ORAL at 16:33

## 2020-07-12 RX ADMIN — CEFDINIR 300 MG: 300 CAPSULE ORAL at 09:39

## 2020-07-12 RX ADMIN — DONEPEZIL HYDROCHLORIDE 5 MG: 5 TABLET, FILM COATED ORAL at 20:45

## 2020-07-12 RX ADMIN — Medication 200 MG: at 09:39

## 2020-07-12 RX ADMIN — CARVEDILOL 12.5 MG: 6.25 TABLET, FILM COATED ORAL at 09:39

## 2020-07-12 ASSESSMENT — PAIN SCALES - GENERAL
PAINLEVEL_OUTOF10: 0
PAINLEVEL_OUTOF10: 0

## 2020-07-12 NOTE — GROUP NOTE
Group Therapy Note    Date: 7/12/2020    Group Start Time: 1400  Group End Time: 1500  Group Topic: Psychotherapy    Mike Mckay, MSW, ELLIEW    Group Therapy Note    Attendees: 5/6       Patient's Goal: \"to go home\"    Notes: Patient participated in group. Open discussion with peers about maintaining mobility as part of healthy aging.      Status After Intervention:  Improved    Participation Level: Minimal    Participation Quality: Appropriate    Speech:  normal    Thought Process/Content: Confusion Present    Affective Functioning: Congruent    Mood: elevated    Level of consciousness:  Alert and Attentive    Response to Learning: Able to verbalize current knowledge/experience    Endings: None Reported    Modes of Intervention: Education and Socialization    Discipline Responsible: /Counselor    Signature: STEFAN Brown, GUANAKO

## 2020-07-12 NOTE — PROGRESS NOTES
Pt alert and oriented x1 on assessment. Pt denies anxiety and depression, unable to verbalize emotions. Very repetat evelyn, looking for daughters and wanting to go home. Uncooperative with medications. Pt trying to get out of bed,  IM haldol provided. Resting comfortably.

## 2020-07-12 NOTE — GROUP NOTE
Group Therapy Note    Date: 7/12/2020    Group Start Time: 8100  Group End Time: 0945  Group Topic: Community Meeting/ AM Goals Group    [facility]    STEFAN Brown, GUANAKO    Group Therapy Note    Attendees: 6/6       Patient's Goal: \"to go home\"    Notes: Patient participated in group. Patient stated that she was feeling \"pretty good\". Patient reported getting \"quite a bit\" of restful sleep last night. Patient was unable to rate on a scale of 0 to 10, depression (no), anxiety (no), irritability/agtiation (no). Reviewed goal, meal selection, and unit schedule for the day.      Status After Intervention:  Unchanged    Participation Level: Interactive    Participation Quality: Appropriate    Speech:  normal    Thought Process/Content: Confusion Present    Affective Functioning: Congruent    Mood: anxious    Level of consciousness:  Alert and Attentive    Response to Learning: Able to verbalize current knowledge/experience    Endings: None Reported    Modes of Intervention: Exploration and Clarifying    Discipline Responsible: /Counselor    Signature: STEFAN Brown LSW

## 2020-07-12 NOTE — PLAN OF CARE
Calista Landis RN  Outcome: Ongoing  Goal: Mood stable  Description: Mood stable  7/12/2020 1022 by Philip Figueroa RN  Outcome: Ongoing  7/12/2020 0413 by Calista Landis RN  Outcome: Ongoing     Problem: Skin Integrity:  Goal: Will show no infection signs and symptoms  Description: Will show no infection signs and symptoms  7/12/2020 1022 by Philip Figueroa RN  Outcome: Ongoing  7/12/2020 0413 by Calista Landis RN  Outcome: Ongoing  Goal: Absence of new skin breakdown  Description: Absence of new skin breakdown  7/12/2020 1022 by Philip Figueroa RN  Outcome: Ongoing  7/12/2020 0413 by Calista Landis RN  Outcome: Ongoing

## 2020-07-12 NOTE — GROUP NOTE
Group Therapy Note    Date: 7/12/2020    Group Start Time: 1120  Group End Time: 0325  Group Topic: Psychoeducation    Mike Mckay, MSW, ELLIEW    Group Therapy Note    Attendees: 5/6       Patient's Goal: \"to go home\"    Notes: Patient participated in group. Open discussion with peers about self-awareness. Status After Intervention:  Unchanged    Participation Level: Minimal    Participation Quality: Appropriate    Speech:  normal    Thought Process/Content: Confusion Present and difficulty following the conversation.     Affective Functioning: Flat    Mood: depressed    Level of consciousness:  Alert and Attentive    Response to Learning: No evidence of learning    Endings: None Reported    Modes of Intervention: Socialization and Exploration    Discipline Responsible: /Counselor    Signature: Sadie Trejo MSW, GUANAKO

## 2020-07-12 NOTE — PROGRESS NOTES
Psychiatric Progress Note    Diana Das  [id number]  07/12/20    CHIEF COMPLAINT: \"I don't know. \"    HPI: Diana Das is an 80year old  female with PMH of dementia, depression, HTN, Hypothyroidism, CAD and myelodysplastic disease. Patient presented to [facility] ED after becoming aggressive at home. Per ED notes patient attempted to break windows at home with her cane, threatened suicide and homicide. Psychiatry was consulted by Lora Garsia CNP for suicide and homicide attempt.     During today's interview the patient is alert and oriented to self only. She is not aware of why she is in the hospital. She denies SI/HI. Denies auditory and visual hallucinations. Denies depression or anxiety. States she lives alone and her daughters help her out at home because they \"want to not that she needs it. \" She states she is sleeping \"OK\" but that her appetite is \"poor. \" Pt noted she currently feels safe and comfortable on the unit. Pt was in agreement with treatment team.  Has been taking medications and has been compliant with treatment. Tolerating medications without side effect complaints. Pt was polite and cordial during the interview process.       Patient sees a Neurologist in [facility] and started the patient on Seroquel for anxiety. Patient noted to talk to people who are not there and sees others who are not there. She has been agitated, threatening suicide and homicide prior to UTI but after UTI it became unmanageable. Patient has recently become paranoid and guarded.     Allergies   Allergen Reactions    Ibuprofen Other (See Comments)     Unknown     Pcn [Penicillins]        Medications Prior to Admission: diphenhydrAMINE (BENADRYL) 50 MG/ML injection, Inject 25 mg into the muscle every 6 hours as needed  enoxaparin (LOVENOX) 30 MG/0.3ML injection, Inject 30 mg into the skin daily  haloperidol (HALDOL) 0.5 MG tablet, Take 0.5 mg by mouth 4 times daily  docusate sodium (COLACE, DULCOLAX) 100 MG Results (from the past 48 hour(s))   Hemoglobin A1c    Collection Time: 07/11/20  6:40 AM   Result Value Ref Range    Hemoglobin A1C 5.5 4.2 - 6.3 %    eAG 111 mg/dL   Lipid panel - fasting    Collection Time: 07/11/20  6:40 AM   Result Value Ref Range    Triglycerides 71 <150 MG/DL    Cholesterol 166 <200 MG/DL    HDL 63 >40 MG/DL    LDL Direct 86 <100 MG/DL   CBC auto differential    Collection Time: 07/11/20  6:40 AM   Result Value Ref Range    WBC 4.4 4.0 - 10.5 K/CU MM    RBC 3.79 (L) 4.2 - 5.4 M/CU MM    Hemoglobin 10.5 (L) 12.5 - 16.0 GM/DL    Hematocrit 34.6 (L) 37 - 47 %    MCV 91.3 78 - 100 FL    MCH 27.7 27 - 31 PG    MCHC 30.3 (L) 32.0 - 36.0 %    RDW 14.9 11.7 - 14.9 %    Platelets 988 070 - 167 K/CU MM    MPV 8.8 7.5 - 11.1 FL    Differential Type AUTOMATED DIFFERENTIAL     Segs Relative 58.4 36 - 66 %    Lymphocytes % 22.1 (L) 24 - 44 %    Monocytes % 13.7 (H) 0 - 4 %    Eosinophils % 4.7 (H) 0 - 3 %    Basophils % 0.9 0 - 1 %    Segs Absolute 2.6 K/CU MM    Lymphocytes Absolute 1.0 K/CU MM    Monocytes Absolute 0.6 K/CU MM    Eosinophils Absolute 0.2 K/CU MM    Basophils Absolute 0.0 K/CU MM    Immature Neutrophil % 0.2 0 - 0.43 %    Total Immature Neutrophil 0.01 K/CU MM   Ammonia    Collection Time: 07/11/20  6:40 AM   Result Value Ref Range    Ammonia 32 11 - 51 UMOL/L   Comprehensive Metabolic Panel w/ Reflex to MG    Collection Time: 07/11/20  6:40 AM   Result Value Ref Range    Sodium 140 135 - 145 MMOL/L    Potassium 3.8 3.5 - 5.1 MMOL/L    Chloride 102 99 - 110 mMol/L    CO2 25 21 - 32 MMOL/L    BUN 21 6 - 23 MG/DL    CREATININE 0.8 0.6 - 1.1 MG/DL    Glucose 72 70 - 99 MG/DL    Calcium 9.0 8.3 - 10.6 MG/DL    Alb 3.7 3.4 - 5.0 GM/DL    Total Protein 6.6 6.4 - 8.2 GM/DL    Total Bilirubin 1.2 (H) 0.0 - 1.0 MG/DL    ALT 12 10 - 40 U/L    AST 29 15 - 37 IU/L    Alkaline Phosphatase 78 40 - 129 IU/L    GFR Non-African American >60 >60 mL/min/1.73m2    GFR African American >60 >60 mL/min/1.73m2 Anion Gap 13 4 - 16   Vitamin D 25 hydroxy    Collection Time: 07/11/20  6:40 AM   Result Value Ref Range    Vit D, 25-Hydroxy 34.08 >20 NG/ML       PSYCHIATRIC ASSESSMENT / MENTAL STATUS EXAM:   Vitals: Blood pressure 132/74, pulse 74, temperature 98.2 °F (36.8 °C), temperature source Temporal, resp. rate 17, SpO2 97 %. CONSTITUTIONAL:    Appearance: appears stated age. alert and oriented to person, place, time & situation. no acute distress. Adequate grooming and hygeine. Good eye contact. No prominent physical abnormalities. Attitude: Manner is cooperative and pleasant  Motor: No psychomotor agitation, retardation or abnormal movements noted  Speech: Clearly articulated; normal rate, volume, tone & amount. Language: intact understanding and production  Mood:  Affect: euthymic, full range, non-labile, congruent with mood and content of speech  Thought Production: Spontaneous. Thought Form: Coherent, linear, logical & goal-directed. No tangentiality or circumstantiality. No flight of ideas or loosening of associations. Thought Content/Perceptions: No SKYLER, no AVH, no delusion  Insight:  Judgment  Memory: Immediate, recent, and remote appear intact, though not formally tested. Attention: maintained throughout interview  Fund of knowledge: Average  Gait/Balance: WNL/WNL    IMPRESSION:   Subcortical vascular dementia with behavioral disturbance    PLAN:  Psychiatric management:medication initiation and titration, group and individual therapy, safe and theraputic environment. Status of problem/condition: Improving  Medical co-morbidities: [facility], appreciate assistance  Legal Status: Pending  Disposition:estimated LOS: Pending. The treatment team reviewed with the patient the diagnosis and treatment recommendations to include the risks, benefits, and side effects of chosen medications.   The patient verbalized understanding and agreed with the treatment regimen as outlined

## 2020-07-13 LAB
EKG ATRIAL RATE: 70 BPM
EKG DIAGNOSIS: NORMAL
EKG P AXIS: 47 DEGREES
EKG P-R INTERVAL: 186 MS
EKG Q-T INTERVAL: 404 MS
EKG QRS DURATION: 106 MS
EKG QTC CALCULATION (BAZETT): 436 MS
EKG R AXIS: 32 DEGREES
EKG T AXIS: 43 DEGREES
EKG VENTRICULAR RATE: 70 BPM

## 2020-07-13 PROCEDURE — 97530 THERAPEUTIC ACTIVITIES: CPT

## 2020-07-13 PROCEDURE — 1240000000 HC EMOTIONAL WELLNESS R&B

## 2020-07-13 PROCEDURE — 97535 SELF CARE MNGMENT TRAINING: CPT

## 2020-07-13 PROCEDURE — 99232 SBSQ HOSP IP/OBS MODERATE 35: CPT | Performed by: PSYCHIATRY & NEUROLOGY

## 2020-07-13 PROCEDURE — 97166 OT EVAL MOD COMPLEX 45 MIN: CPT

## 2020-07-13 PROCEDURE — 6370000000 HC RX 637 (ALT 250 FOR IP): Performed by: NURSE PRACTITIONER

## 2020-07-13 RX ORDER — HALOPERIDOL 2 MG/ML
5 SOLUTION ORAL EVERY 4 HOURS PRN
Status: DISCONTINUED | OUTPATIENT
Start: 2020-07-13 | End: 2020-07-19 | Stop reason: HOSPADM

## 2020-07-13 RX ORDER — DIPHENHYDRAMINE HCL 12.5MG/5ML
25 LIQUID (ML) ORAL EVERY 4 HOURS PRN
Status: DISCONTINUED | OUTPATIENT
Start: 2020-07-13 | End: 2020-07-19 | Stop reason: HOSPADM

## 2020-07-13 RX ORDER — ACETAMINOPHEN 160 MG/5ML
650 SOLUTION ORAL EVERY 4 HOURS PRN
Status: DISCONTINUED | OUTPATIENT
Start: 2020-07-13 | End: 2020-07-19 | Stop reason: HOSPADM

## 2020-07-13 RX ORDER — ACETAMINOPHEN 160 MG/5ML
325 SOLUTION ORAL EVERY 4 HOURS PRN
Status: DISCONTINUED | OUTPATIENT
Start: 2020-07-13 | End: 2020-07-19 | Stop reason: HOSPADM

## 2020-07-13 RX ORDER — ASPIRIN 81 MG/1
81 TABLET, CHEWABLE ORAL DAILY
Status: DISCONTINUED | OUTPATIENT
Start: 2020-07-13 | End: 2020-07-19 | Stop reason: HOSPADM

## 2020-07-13 RX ORDER — ACETAMINOPHEN 160 MG/5ML
500 SOLUTION ORAL EVERY 4 HOURS PRN
Status: DISCONTINUED | OUTPATIENT
Start: 2020-07-13 | End: 2020-07-19 | Stop reason: HOSPADM

## 2020-07-13 RX ORDER — HALOPERIDOL 5 MG/ML
5 INJECTION INTRAMUSCULAR EVERY 4 HOURS PRN
Status: DISCONTINUED | OUTPATIENT
Start: 2020-07-13 | End: 2020-07-19 | Stop reason: HOSPADM

## 2020-07-13 RX ADMIN — ASPIRIN 81 MG 81 MG: 81 TABLET ORAL at 08:26

## 2020-07-13 RX ADMIN — CARVEDILOL 12.5 MG: 6.25 TABLET, FILM COATED ORAL at 08:26

## 2020-07-13 RX ADMIN — DOCUSATE SODIUM 100 MG: 50 LIQUID ORAL at 08:53

## 2020-07-13 RX ADMIN — ACETAMINOPHEN ORAL SOLUTION 650 MG: 650 SOLUTION ORAL at 20:23

## 2020-07-13 RX ADMIN — CEFDINIR 300 MG: 300 CAPSULE ORAL at 08:53

## 2020-07-13 RX ADMIN — LOSARTAN POTASSIUM 50 MG: 50 TABLET ORAL at 08:26

## 2020-07-13 RX ADMIN — AMIODARONE HYDROCHLORIDE 200 MG: 200 TABLET ORAL at 08:26

## 2020-07-13 RX ADMIN — CYANOCOBALAMIN TAB 1000 MCG 1000 MCG: 1000 TAB at 08:26

## 2020-07-13 RX ADMIN — Medication 200 MG: at 08:26

## 2020-07-13 RX ADMIN — LEVOTHYROXINE SODIUM 75 MCG: 0.07 TABLET ORAL at 06:36

## 2020-07-13 ASSESSMENT — PAIN SCALES - PAIN ASSESSMENT IN ADVANCED DEMENTIA (PAINAD)
TOTALSCORE: 7
FACIALEXPRESSION: 2
NEGVOCALIZATION: 1
BODYLANGUAGE: 1
CONSOLABILITY: 2
BREATHING: 1

## 2020-07-13 ASSESSMENT — PAIN SCALES - GENERAL
PAINLEVEL_OUTOF10: 4
PAINLEVEL_OUTOF10: 7
PAINLEVEL_OUTOF10: 7

## 2020-07-13 NOTE — PLAN OF CARE
Problem: Falls - Risk of:  Goal: Will remain free from falls  Description: Will remain free from falls  7/12/2020 2350 by Gely Serrano RN  Outcome: Ongoing  7/12/2020 2337 by Gely Serrano RN  Outcome: Ongoing  7/12/2020 1022 by Verna Webb RN  Outcome: Ongoing  Goal: Absence of physical injury  Description: Absence of physical injury  7/12/2020 2350 by Gely Serrano RN  Outcome: Ongoing  7/12/2020 2337 by Gely Serrano RN  Outcome: Ongoing  7/12/2020 1022 by Verna Webb RN  Outcome: Ongoing     Problem: Anger Management/Homicidal Ideation:  Goal: Able to display appropriate communication and problem solving  Description: Able to display appropriate communication and problem solving  7/12/2020 2350 by Gely Serrano RN  Outcome: Ongoing  7/12/2020 2337 by Gely Serrano RN  Outcome: Ongoing  7/12/2020 1022 by Verna Webb RN  Outcome: Ongoing  Goal: Ability to verbalize frustrations and anger appropriately will improve  Description: Ability to verbalize frustrations and anger appropriately will improve  7/12/2020 2350 by Gely Serrano RN  Outcome: Ongoing  7/12/2020 2337 by Gely Serrano RN  Outcome: Ongoing  7/12/2020 1022 by Verna Webb RN  Outcome: Ongoing  Goal: Absence of angry outbursts  Description: Absence of angry outbursts  7/12/2020 2350 by Gely Serrano RN  Outcome: Ongoing  7/12/2020 2337 by Gely Serrano RN  Outcome: Ongoing  7/12/2020 1022 by Verna Webb RN  Outcome: Ongoing     Problem: Altered Mood, Depressive Behavior:  Goal: Able to verbalize and/or display a decrease in depressive symptoms  Description: Able to verbalize and/or display a decrease in depressive symptoms  7/12/2020 2350 by Gely Serrano RN  Outcome: Ongoing  7/12/2020 2337 by Gely Serrano RN  Outcome: Ongoing  7/12/2020 1022 by Verna Webb RN  Outcome: Ongoing     Problem: Altered Mood, Deterioration in Function:  Goal: Maintenance of adequate nutrition will improve  Description: Maintenance of adequate nutrition will improve  7/12/2020 2350 by Dory Arora RN  Outcome: Ongoing  7/12/2020 2337 by Dory Arora RN  Outcome: Ongoing  7/12/2020 1022 by Kenny Jansen RN  Outcome: Ongoing     Problem: Altered Mood, Manic Behavior:  Goal: Able to sleep  Description: Able to sleep  7/12/2020 2350 by Dory Arora RN  Outcome: Ongoing  7/12/2020 2337 by Dory Arora RN  Outcome: Ongoing  7/12/2020 1022 by Kenny Jansen RN  Outcome: Ongoing  Goal: Mood stable  Description: Mood stable  7/12/2020 2350 by Dory Arora RN  Outcome: Ongoing  7/12/2020 2337 by Dory Arora RN  Outcome: Ongoing  7/12/2020 1022 by Kenny Jansen RN  Outcome: Ongoing     Problem: Skin Integrity:  Goal: Will show no infection signs and symptoms  Description: Will show no infection signs and symptoms  7/12/2020 2350 by Dory Arora RN  Outcome: Ongoing  7/12/2020 2337 by Dory Arora RN  Outcome: Ongoing  7/12/2020 1022 by Kenny Jansen RN  Outcome: Ongoing  Goal: Absence of new skin breakdown  Description: Absence of new skin breakdown  7/12/2020 2350 by Dory Arora RN  Outcome: Ongoing  7/12/2020 2337 by Dory Arora RN  Outcome: Ongoing  7/12/2020 1022 by Kenny Jansen RN  Outcome: Ongoing

## 2020-07-13 NOTE — PROGRESS NOTES
regurgitation, Myelodysplastic disease ([facility] [facility] Non-rheumatic mitral regurgitation, PAF (paroxysmal atrial fibrillation) ([facility] [address] and Subcortical vascular dementia with behavioral disturbance ([facility] [address] has a past surgical history that includes HEMIARTHROPLASTY HIP (Left, 5/14/2019). Treatment Diagnosis: decreased I adls, transfers      Restrictions       Subjective   General  Chart Reviewed: Yes  Family / Caregiver Present: No  Patient Currently in Pain: Yes  Pain Assessment  Pain Assessment: (unable to give a number)  Vital Signs  Patient Currently in Pain: Yes  Social/Functional History  Social/Functional History  Lives With: Alone  Type of Home: House  Home Layout: One level  Home Access: Stairs to enter with rails  Bathroom Shower/Tub: Tub/Shower unit  Bathroom Toilet: Standard  Bathroom Equipment: Shower chair  Home Equipment: [address] Help From: Family  ADL Assistance: Independent(Pt states she is I and case management states she has been I; Pt is a poor historian)  Homemaking Assistance: (Pt states she has been independent)  Homemaking Responsibilities: No  Ambulation Assistance: Independent(with a cane)  Transfer Assistance: Independent  Active : No  Education: [employer]  Occupation: Retired  Leisure & Hobbies: Cooking       Objective   Vision: Impaired  Vision Exceptions: Wears glasses at all times  Hearing: Within functional limits    Orientation  Overall Orientation Status: Impaired  Orientation Level: Oriented to person;Disoriented to place; Disoriented to situation;Disoriented to time  Observation/Palpation  Observation: Pt ambulates with shuffled gait and needed cues to take bigger steps and needed cues to use walker  Balance  Sitting Balance: Independent  Standing Balance: Contact guard assistance  Functional Mobility  Functional - Mobility Device: Rolling Walker  Assist Level: Minimal assistance  Functional Mobility Comments: OT was CGA for ambulating with mod vc to take bigger steps; OT guided the walker  ADL  Feeding: Setup  UE Bathing: Minimal assistance(simulated)  LE Bathing: (was unable to reach to her feet; says her back hurts)  UE Dressing: Moderate assistance(for sweater)  LE Dressing: Dependent/Total(socks)  Tone RUE  RUE Tone: Normotonic  Tone LUE  LUE Tone: Normotonic  Coordination  Coordination and Movement description: (very slight tremors at times)        Transfers  Sit to stand: Contact guard assistance  Stand to sit: Contact guard assistance     Cognition  Overall Cognitive Status: Exceptions  Following Commands: Follows one step commands with increased time; Follows one step commands with repetition  Attention Span: Attends with cues to redirect  Memory: Decreased recall of biographical Information;Decreased recall of recent events;Decreased short term memory  Safety Judgement: Decreased awareness of need for assistance;Decreased awareness of need for safety  Problem Solving: Assistance required to correct errors made;Assistance required to identify errors made;Assistance required to generate solutions;Assistance required to implement solutions;Decreased awareness of errors  Insights: Decreased awareness of deficits  Initiation: Requires cues for some  Sequencing: Requires cues for some        Sensation  Overall Sensation Status: WFL        LUE AROM (degrees)  LUE AROM : WFL  RUE AROM (degrees)  RUE AROM : WFL  LUE Strength  Gross LUE Strength: Exceptions to WFL(3+/5 but unsure pt was trying)  RUE Strength  Gross RUE Strength: WFL     Hand Dominance  Hand Dominance: Right             Plan   Plan  Times per week: 2+  Current Treatment Recommendations: Strengthening, ROM, Balance Training, Functional Mobility Training, Endurance Training, Safety Education & Training, Self-Care / ADL, Patient/Caregiver Education & Training, Cognitive/Perceptual Training    G-Code     OutComes Score                                                  AM-PAC Score    AM-PAC 6 click short form for inpatient daily activity:   How much help from another person does the patient currently need. .. Unable  Dep A Lot  Max A A Lot   Mod A A Little  Min A A Little   CGA  SBA None   Mod I  Indep  Sup   1. Putting on and taking off regular lower body clothing? [] 1    [x] 2   [] 2   [] 3   [] 3   [] 4      2. Bathing (including washing, rinsing, drying)? [] 1   [x] 2   [] 2 [] 3 [] 3 [] 4   3. Toileting, which includes using toilet, bedpan, or urinal? [] 1    [] 2   [x] 2   [] 3   [] 3   [] 4     4. Putting on and taking off regular upper body clothing? [] 1   [] 2   [x] 2   [] 3   [] 3    [] 4      5. Taking care of personal grooming such as brushing teeth? [] 1   [] 2    [] 2 [] 3    [x] 3   [] 4      6. Eating meals?    [] 1   [] 2   [] 2   [] 3   [] 3   [x] 4      Raw Score:  15/24 40-59% impaired    [24=0% impaired(CH), 23=1-19%(CI), 20-22=20-39%(CJ), 15-19=40-59%(CK), 10-14=60-79%(CL), 7-9=80-99%(CM), 6=100%(CN)]            Goals  Short term goals  Time Frame for Short term goals: until discharge  Short term goal 1: Pt will be SBA/S with functional adl transfers to chair, toilet, and bed w/o LOB or falls  Short term goal 2: Pt will be S for UB/ SBALB bathing  Short term goal 3: Pt will be SBA UB dressing/CGA for LB dressing  Short term goal 4: Pt will be SBA for toileting and grooming activities  Patient Goals   Patient goals : Pt would like to go home       Therapy Time   Individual Concurrent Group Co-treatment   Time In 1330         Time Out 1410         Minutes 40         Timed Code Treatment Minutes: [address], OT

## 2020-07-13 NOTE — GROUP NOTE
Group Therapy Note    Date: 7/13/2020    Group Start Time: 7372  Group End Time: 1600    Number of Participants: 4/6    Type: Exercise/Recreation Group    Group Topic/Objective: Chair Exercises     Notes:  Pt attended group, but minimally engaged. Pt completed the last 4 exercises of group. Pt did not respond to prompting/encouragement.      Status After Intervention:  Unchanged    Participation Level: Minimal    Participation Quality: Resistant    Speech:  normal    Thought Process/Content: Confused    Affective Functioning: Flat    Mood: Flat    Level of consciousness:  Preoccupied and Inattentive    Response to Learning: Resistant    Endings: None Reported    Modes of Intervention: Activity and Movement    Discipline Responsible: Certified Therapeutic Recreation Specialist     Electronically signed by Yovani Joya MA on 7/13/2020 at 4:08 PM

## 2020-07-13 NOTE — BH NOTE
Pt confused, alert to self only. Talks about needing to let her mom know about her being here. Compliant with meds this shift. Walks with walker with a shuffling gate. Assisted to bathroom with moderate assist on and off toilet. Does not answer questions logically.

## 2020-07-13 NOTE — GROUP NOTE
Group Therapy Note    Date: 7/13/2020    Group Start Time: 1030  Group End Time: 1672  Group Topic: Psychoeducation    [address] [place], [place]        Group Therapy Note    Attendees: 5/7       Notes:  Pts participated in recreational therapy group; [employer]. Pts and therapist utilized various activities to reminisce about their past and share stories. Purpose was to encourage positive thinking and socialization. Pt attended group, but would not engage in the activity. Pt did not respond to prompting/encouragement.      Status After Intervention:  Unchanged    Participation Level: None    Participation Quality: Resistant      Speech:  normal      Thought Process/Content: Logical      Affective Functioning: Flat      Mood: Flat      Level of consciousness:  Inattentive      Response to Learning: Resistant      Endings: None Reported    Modes of Intervention: Support, Socialization, Exploration and Activity      Discipline Responsible: Certified Therapeutic Recreation Specialist       Signature:  Mishel Jamison

## 2020-07-13 NOTE — PROGRESS NOTES
Therapist completed 1:1 session with pt starting at ~1415 and lasting for approximately 45 minutes. Pt walked up and down the hallway x1 and utilized fidget pillow. Pt minimally engaged in conversation, but would insist upon therapist staying next to her at all times. Pt at one point became fixated on leaving, but was able to be distracted with Mod prompting/redirection. Pt declined to engage in any leisure activities offered.      Electronically signed by KARIN Xavier MA on 7/13/2020 at 3:17 PM

## 2020-07-13 NOTE — PROGRESS NOTES
Focus Note    General Observations: Pt appears to be Withdrawn and Other flat affect* during the shift today AEB patient answering questions but not volunteering information, patient has also been confused, only oriented to person. She has not been oriented to person, place, or time. Pt has been compliant with medication and has decreased orientation and increased weakness AEB patient unable to answer any orientation questions except her name and having more difficulty walking and getting around by herself. Amount of sleep: 8 hours    Appetite: decreased    Suicidal Ideations: No    Homicidal Ideations: No    Hallucinations:  absent - No problems reported or observed     Delusions:  absent -  no problems reported or observed     Treatment Huddle Discussion: Patient has attended group but has been unable to verbalize goals and has only been oriented to name. Patient is very inattentive during groups and has difficulty focusing and answering questions. Patient has been unsteady when walking even with hands on assistance from staff, therefore patient has been in wheelchair in day room for morning. After lunch patient agreeable to sit in chair in day room with walker within arms reach. Patient has consistently removed chair alarm despite repositioning by staff, when taken away from patient to place back on chair, patient has stated \"I have to keep track of that it belongs to my \", patient unable to state what object chair alarm is. Patient has also gotten up from chair several times and been reoriented to ask for staff before getting up or leaving chair. This evening around 1600, patient got agitated and stated that she was going to give [place] a \"black eye\". This RN attempted to get patient to take Haldol liquid mixed with water but patient refused to drink.  Patient refused to eat dinner also, she instead used walker and walked [place] and down [place] trying any closed doors as she stated \"I am going to go home\". Patient kept asking this RN who she was and when informed of name and title patient stated this RN was lying, that patient would never talk to this RN ever again, and then would keep walking. Patient told this RN and Smiley Cordova to not talk to her and stated that she would never talk to staff again. Patient threatened to break window but did not approach windows or raise her hand.    Reina Kay RN

## 2020-07-13 NOTE — BH NOTE
Group Therapy Note    Date: 7/12/2020  Start Time: 1930  End Time:  2000  Number of Participants: 1      Type of Group: Nursing Education      Notes:  Discussed medications and side effects, attempted to reorient patient to surroundings and situation.     Status After Intervention:  Unchanged    Participation Level: Minimal    Participation Quality: Appropriate      Speech:  normal      Thought Process/Content: Flight of ideas      Affective Functioning: Blunted      Mood: depressed      Level of consciousness:  Alert      Response to Learning: Progressing to goal      Endings: None Reported    Modes of Intervention: Education      Discipline Responsible: Registered Nurse      Signature:  Saad King RN

## 2020-07-13 NOTE — CARE COORDINATION
ELLIEW received call from patient's daughter Abel Napoles ([phone]). [place] asked what to legally do about patient. LSW informed that we could not give legal advice, but our doctor could help complete guardianship paperwork if needed. [place] verbalized understanding. 3:30 PM  LSW gave completed Emergency guardianship paperwork to patient's nurse Rodney Champion to give to Abel Napoles when she gets here.

## 2020-07-13 NOTE — GROUP NOTE
Group Therapy Note    Date: 7/13/2020    Group Start Time: 0830  Group End Time: 0900    Number of Participants: 5/7    Type: Morning Goals Group/ Community Meeting    Group Topic/Objective: Set Goal For The Day and to review Unit Rules and Regulations. Patient's Goal:  Pt unable to state a goal at this time. Pt did not respond to any suggestions by therapist.     Notes:  Pt minimally engaged in group. Pt kept eyes focused on her lap. Pt states she is feeling \"ok\", but unable to state anything she is grateful for at this time. Pt reports restful sleep, but unsure of how many hours she slept.  Pt noted to be less agitated than in previous interaction with therapist.     Depression (0-10): 0    Anxiety (0-10): 0    Irritability/Aggitation (0-10): 0    Status After Intervention:  Improved    Participation Level: Minimal    Participation Quality: minimal    Speech:  hesitant    Thought Process/Content: Logical    Affective Functioning: Flat    Mood: Flat    Level of consciousness:  Alert    Response to Learning: Resistant    Endings: None Reported    Modes of Intervention: Education, Support and Socialization    Discipline Responsible: Certified Therapeutic Recreation Specialist     Electronically signed by KARIN Jimenez MA on 7/13/2020 at 9:10 AM

## 2020-07-13 NOTE — PLAN OF CARE
Problem: Falls - Risk of:  Goal: Will remain free from falls  Description: Will remain free from falls  7/12/2020 2337 by Corrinne Shorten, RN  Outcome: Ongoing  7/12/2020 1022 by Ashley Hermosillo RN  Outcome: Ongoing  Goal: Absence of physical injury  Description: Absence of physical injury  7/12/2020 2337 by Corrinne Shorten, RN  Outcome: Ongoing  7/12/2020 1022 by Ashley Hermosillo RN  Outcome: Ongoing     Problem: Anger Management/Homicidal Ideation:  Goal: Able to display appropriate communication and problem solving  Description: Able to display appropriate communication and problem solving  7/12/2020 2337 by Corrinne Shorten, RN  Outcome: Ongoing  7/12/2020 1022 by Ashley Hermosillo RN  Outcome: Ongoing  Goal: Ability to verbalize frustrations and anger appropriately will improve  Description: Ability to verbalize frustrations and anger appropriately will improve  7/12/2020 2337 by Corrinne Shorten, RN  Outcome: Ongoing  7/12/2020 1022 by Ashley Hermosillo RN  Outcome: Ongoing  Goal: Absence of angry outbursts  Description: Absence of angry outbursts  7/12/2020 2337 by Corrinne Shorten, RN  Outcome: Ongoing  7/12/2020 1022 by Ashley Hermosillo RN  Outcome: Ongoing     Problem: Altered Mood, Depressive Behavior:  Goal: Able to verbalize and/or display a decrease in depressive symptoms  Description: Able to verbalize and/or display a decrease in depressive symptoms  7/12/2020 2337 by Corrinne Shorten, RN  Outcome: Ongoing  7/12/2020 1022 by Ashley Hermosillo RN  Outcome: Ongoing     Problem: Altered Mood, Deterioration in Function:  Goal: Maintenance of adequate nutrition will improve  Description: Maintenance of adequate nutrition will improve  7/12/2020 2337 by Corrinne Shorten, RN  Outcome: Ongoing  7/12/2020 1022 by Ashley Hermosillo RN  Outcome: Ongoing     Problem: Altered Mood, Manic Behavior:  Goal: Able to sleep  Description: Able to sleep  7/12/2020 2337 by Corrinne Shorten, RN  Outcome: Ongoing  7/12/2020 1022 by Ninfa Knapp RN  Outcome: Ongoing  Goal: Mood stable  Description: Mood stable  7/12/2020 2337 by Lillie Draper RN  Outcome: Ongoing  7/12/2020 1022 by Ninfa Knapp RN  Outcome: Ongoing     Problem: Skin Integrity:  Goal: Will show no infection signs and symptoms  Description: Will show no infection signs and symptoms  7/12/2020 2337 by Lillie Draper RN  Outcome: Ongoing  7/12/2020 1022 by Ninfa Knapp RN  Outcome: Ongoing  Goal: Absence of new skin breakdown  Description: Absence of new skin breakdown  7/12/2020 2337 by Lillie Draper RN  Outcome: Ongoing  7/12/2020 1022 by Ninfa Knapp RN  Outcome: Ongoing

## 2020-07-13 NOTE — PROGRESS NOTES
Psychiatric Progress Note    Mamie Zuniga  [id number]  07/13/20    CHIEF COMPLAINT: \"I don't know. \"    HPI: Mamie Zuniga is an 80year old  female with PMH of dementia, depression, HTN, Hypothyroidism, CAD and myelodysplastic disease. Patient presented to [facility] ED after becoming aggressive at home. Per ED notes patient attempted to break windows at home with her cane, threatened suicide and homicide. Psychiatry was consulted by Adrián Jay CNP for suicide and homicide attempt.     During today's interview the patient is alert and oriented to self only. She is not aware of why she is in the hospital. She denies SI/HI. Denies auditory and visual hallucinations. Denies depression or anxiety. States she lives alone and her daughters help her out at home because they \"want to not that she needs it. \" She states she is sleeping \"OK\" but that her appetite is \"poor. \" Pt noted she currently feels safe and comfortable on the unit. Pt was in agreement with treatment team.  Has been taking medications and has been compliant with treatment. Tolerating medications without side effect complaints. Pt was polite and cordial during the interview process.         Pt noted she enjoys the unit and feels safe and comfortable       Allergies   Allergen Reactions    Ibuprofen Other (See Comments)     Unknown     Pcn [Penicillins]        Medications Prior to Admission: diphenhydrAMINE (BENADRYL) 50 MG/ML injection, Inject 25 mg into the muscle every 6 hours as needed  enoxaparin (LOVENOX) 30 MG/0.3ML injection, Inject 30 mg into the skin daily  haloperidol (HALDOL) 0.5 MG tablet, Take 0.5 mg by mouth 4 times daily  docusate sodium (COLACE, DULCOLAX) 100 MG CAPS, Take 100 mg by mouth 2 times daily  amiodarone (CORDARONE) 200 MG tablet, Take 1 tablet by mouth daily  carvedilol (COREG) 25 MG tablet, Take 12.5 mg by mouth 2 times daily (with meals)   levothyroxine (SYNTHROID) 75 MCG tablet, Take 75 mcg by mouth Daily.   aspirin 81 MG tablet, Take 81 mg by mouth daily. cefdinir (OMNICEF) 300 MG capsule, Take 1 capsule by mouth 2 times daily for 5 days  QUEtiapine (SEROQUEL) 25 MG tablet, Take 50 mg by mouth nightly  vitamin B-12 (CYANOCOBALAMIN) 1000 MCG tablet, Take 1,000 mcg by mouth daily  losartan (COZAAR) 50 MG tablet, Take 50 mg by mouth daily  MAGNESIUM-ZINC PO, Take 1 tablet by mouth daily. Past Medical History:   Diagnosis Date    Dilated cardiomyopathy ([address]     Grade I diastolic dysfunction 36/57/3044    Hyperlipidemia     Hypertension     Hypothyroidism     Ischemic cardiomyopathy     Mild aortic insufficiency 05/14/2019    Mild left ventricular hypertrophy 05/14/2019    Mild mitral regurgitation 05/14/2019    Myelodysplastic disease ([address]     Non-rheumatic mitral regurgitation     PAF [facility]paroxysmal atrial fibrillation) ([facility])     Subcortical vascular dementia with behavioral disturbance ([address] 07/10/2020        Patient Active Problem List   Diagnosis    Closed displaced fracture of left femoral neck ([address]    Thyroid disease    Hypertension    CAD (coronary artery disease)    Myelodysplastic disease ([address]    Dementia, in, senility, with behavioral disturbance ([address]    Acute metabolic encephalopathy    Subcortical vascular dementia with behavioral disturbance ([address]    Hypothyroidism    Non-rheumatic mitral regurgitation    Hyperlipidemia    Grade I diastolic dysfunction    Mild left ventricular hypertrophy    Mild aortic insufficiency    Mild mitral regurgitation       Review of Systems    OBJECTIVE  Vital Signs:  Vitals:    07/13/20 0755   BP: (!) 149/79   Pulse: 80   Resp: 18   Temp: 97.4 °F (36.3 °C)   SpO2: 98%       Labs:  No results found for this or any previous visit (from the past 48 hour(s)). PSYCHIATRIC ASSESSMENT / MENTAL STATUS EXAM:   Vitals: Blood pressure (!) 149/79, pulse 80, temperature 97.4 °F (36.3 °C), temperature source Temporal, resp. rate 18, SpO2 98 %.   CONSTITUTIONAL: Appearance: appears stated age. alert and oriented to person. no acute distress. Adequate grooming and hygeine. Good eye contact. No prominent physical abnormalities. Attitude: Manner is uncooperative yet redirectable  Motor: No psychomotor agitation, retardation or abnormal movements noted  Speech: Clearly articulated; normal rate, volume, tone & amount. Language: intact understanding and production  Mood: okay  Affect: euthymic, full range, non-labile, congruent with mood and content of speech  Thought Production: Spontaneous. Thought Form:   linear, logical & goal-directed. No tangentiality or circumstantiality. No flight of ideas or loosening of associations. Thought Content/Perceptions: No SKYLER, no AVH, no delusion  Insight:  Judgment  Memory: Immediate, recent, and remote appear impaired, though not formally tested. Attention: maintained throughout interview  Fund of knowledge: Average  Gait/Balance: WNL/WNL    IMPRESSION:   Subcortical vascular dementia with behavioral disturbance    PLAN:  Psychiatric management:medication initiation and titration, group and individual therapy, safe and theraputic environment. Status of problem/condition: Improving  Medical co-morbidities: [facility], appreciate assistance  Legal Status: Pending  Disposition:estimated LOS: Pending. The treatment team reviewed with the patient the diagnosis and treatment recommendations to include the risks, benefits, and side effects of chosen medications. The patient verbalized understanding and agreed with the treatment regimen as outlined above. The patient was encouraged to participate in groups. Medical records, Labs, Diagnotic tests reviewed  q15 min safety checks for safety  Interval History.   Review current labs  Continue current medications  Supportive Therapy Provided  Pt had an opportunity to ask questions and address concerns  Pt encouraged to continue therapy group or individual.  Pt was in

## 2020-07-13 NOTE — PLAN OF CARE
Patient has not had any angry outbursts during this shift. Patient has been confused during this shift and trying to get up by herself. Problem: Anger Management/Homicidal Ideation:  Goal: Absence of angry outbursts  Description: Absence of angry outbursts  Outcome: Ongoing     Patient encouraged to eat and drink, patient states that she is not hungry or thirsty.    Problem: Altered Mood, Deterioration in Function:  Goal: Maintenance of adequate nutrition will improve  Description: Maintenance of adequate nutrition will improve  Outcome: Ongoing

## 2020-07-14 PROCEDURE — 6370000000 HC RX 637 (ALT 250 FOR IP): Performed by: NURSE PRACTITIONER

## 2020-07-14 PROCEDURE — 99233 SBSQ HOSP IP/OBS HIGH 50: CPT | Performed by: NURSE PRACTITIONER

## 2020-07-14 PROCEDURE — 1240000000 HC EMOTIONAL WELLNESS R&B

## 2020-07-14 RX ORDER — DRONABINOL 2.5 MG/1
2.5 CAPSULE ORAL 2 TIMES DAILY WITH MEALS
Status: DISCONTINUED | OUTPATIENT
Start: 2020-07-14 | End: 2020-07-14

## 2020-07-14 RX ORDER — DRONABINOL 2.5 MG/1
2.5 CAPSULE ORAL 2 TIMES DAILY WITH MEALS
Status: DISCONTINUED | OUTPATIENT
Start: 2020-07-14 | End: 2020-07-17

## 2020-07-14 RX ADMIN — ACETAMINOPHEN ORAL SOLUTION 650 MG: 650 SOLUTION ORAL at 14:02

## 2020-07-14 RX ADMIN — LORAZEPAM 1 MG: 1 TABLET ORAL at 23:05

## 2020-07-14 RX ADMIN — DONEPEZIL HYDROCHLORIDE 5 MG: 5 TABLET, FILM COATED ORAL at 20:49

## 2020-07-14 RX ADMIN — ASPIRIN 81 MG 81 MG: 81 TABLET ORAL at 08:22

## 2020-07-14 RX ADMIN — CARVEDILOL 12.5 MG: 6.25 TABLET, FILM COATED ORAL at 16:58

## 2020-07-14 RX ADMIN — LOSARTAN POTASSIUM 50 MG: 50 TABLET ORAL at 08:22

## 2020-07-14 RX ADMIN — DOCUSATE SODIUM 100 MG: 50 LIQUID ORAL at 08:21

## 2020-07-14 RX ADMIN — AMIODARONE HYDROCHLORIDE 200 MG: 200 TABLET ORAL at 08:22

## 2020-07-14 RX ADMIN — CEFDINIR 300 MG: 300 CAPSULE ORAL at 20:48

## 2020-07-14 RX ADMIN — LEVOTHYROXINE SODIUM 75 MCG: 0.07 TABLET ORAL at 08:23

## 2020-07-14 RX ADMIN — ACETAMINOPHEN ORAL SOLUTION 650 MG: 650 SOLUTION ORAL at 23:05

## 2020-07-14 RX ADMIN — CARVEDILOL 12.5 MG: 6.25 TABLET, FILM COATED ORAL at 08:22

## 2020-07-14 RX ADMIN — DRONABINOL 2.5 MG: 2.5 CAPSULE ORAL at 16:58

## 2020-07-14 RX ADMIN — QUETIAPINE FUMARATE 50 MG: 50 TABLET ORAL at 20:48

## 2020-07-14 RX ADMIN — TRAZODONE HYDROCHLORIDE 50 MG: 50 TABLET ORAL at 20:48

## 2020-07-14 RX ADMIN — Medication 200 MG: at 08:22

## 2020-07-14 RX ADMIN — CYANOCOBALAMIN TAB 1000 MCG 1000 MCG: 1000 TAB at 08:22

## 2020-07-14 RX ADMIN — CEFDINIR 300 MG: 300 CAPSULE ORAL at 08:21

## 2020-07-14 ASSESSMENT — PAIN DESCRIPTION - ORIENTATION: ORIENTATION: LOWER

## 2020-07-14 ASSESSMENT — PAIN SCALES - GENERAL
PAINLEVEL_OUTOF10: 8
PAINLEVEL_OUTOF10: 0
PAINLEVEL_OUTOF10: 10
PAINLEVEL_OUTOF10: 0

## 2020-07-14 ASSESSMENT — PAIN DESCRIPTION - LOCATION: LOCATION: BACK

## 2020-07-14 ASSESSMENT — PAIN DESCRIPTION - ONSET: ONSET: GRADUAL

## 2020-07-14 ASSESSMENT — PAIN DESCRIPTION - FREQUENCY: FREQUENCY: INTERMITTENT

## 2020-07-14 ASSESSMENT — PAIN DESCRIPTION - PROGRESSION: CLINICAL_PROGRESSION: NOT CHANGED

## 2020-07-14 ASSESSMENT — PAIN DESCRIPTION - DESCRIPTORS: DESCRIPTORS: ACHING

## 2020-07-14 ASSESSMENT — PAIN - FUNCTIONAL ASSESSMENT: PAIN_FUNCTIONAL_ASSESSMENT: PREVENTS OR INTERFERES SOME ACTIVE ACTIVITIES AND ADLS

## 2020-07-14 NOTE — PROGRESS NOTES
Pt alert to self only during the shift. Pt denies SI, HI or AVH, no response when asked about depression or anxiety. Pt cooperative with meds crushed and placed in chocolate pudding. Pt able to ambulate around unit with use of a front wheeled walker.

## 2020-07-14 NOTE — CARE COORDINATION
ELLIEW called [phone] [facility] ([phone]) to set patient follow-up. ELLIEW awaits call back.

## 2020-07-14 NOTE — BH NOTE
Lucy was visible on the unit. She sat with peers but had minimal conversation with them. She was cooperative with vitals. Polite but guarded on approach. Did not answer assessment questions. Reviewed medication with her. She took the medication cup, looked at the pills and stated that she does not take these because they make her sick. She was cooperative with Tylenol for back pain rated 7/10. She uses a walker to ambulate. Will continue to monitor for safety.

## 2020-07-14 NOTE — PROGRESS NOTES
Attempted 1:1 session with pt at ~1330. Pt unable to focus on activity. Therapist will attempt again at a later time.      Electronically signed by KARIN Reid MA on 7/14/2020 at 1:36 PM

## 2020-07-14 NOTE — PLAN OF CARE
Problem: Falls - Risk of:  Goal: Will remain free from falls  Description: Will remain free from falls  7/14/2020 1115 by Agata March RN  Outcome: Ongoing  7/14/2020 0217 by Paula Miller RN  Outcome: Met This Shift  Goal: Absence of physical injury  Description: Absence of physical injury  7/14/2020 1115 by Agata March RN  Outcome: Ongoing  7/14/2020 0217 by Paula Miller RN  Outcome: Met This Shift     Problem: Anger Management/Homicidal Ideation:  Goal: Able to display appropriate communication and problem solving  Description: Able to display appropriate communication and problem solving  7/14/2020 1115 by Agata March RN  Outcome: Ongoing  7/14/2020 0217 by Paula Miller RN  Outcome: Ongoing  Goal: Ability to verbalize frustrations and anger appropriately will improve  Description: Ability to verbalize frustrations and anger appropriately will improve  7/14/2020 1115 by Agata March RN  Outcome: Ongoing  7/14/2020 0217 by Paula Miller RN  Outcome: Ongoing  Goal: Absence of angry outbursts  Description: Absence of angry outbursts  7/14/2020 1115 by Agata March RN  Outcome: Ongoing  7/14/2020 0217 by Paula Miller RN  Outcome: Met This Shift     Problem: Altered Mood, Depressive Behavior:  Goal: Able to verbalize and/or display a decrease in depressive symptoms  Description: Able to verbalize and/or display a decrease in depressive symptoms  7/14/2020 1115 by Agata March RN  Outcome: Ongoing  7/14/2020 0217 by Paula Miller RN  Outcome: Ongoing     Problem: Altered Mood, Deterioration in Function:  Goal: Maintenance of adequate nutrition will improve  Description: Maintenance of adequate nutrition will improve  7/14/2020 1115 by Agata March RN  Outcome: Ongoing  7/14/2020 0217 by Paula Miller RN  Outcome: Ongoing

## 2020-07-14 NOTE — PLAN OF CARE
5 [facility]  Day 3 Interdisciplinary Treatment Plan NOTE    Review Date & Time: 07/13/20  0830    Admission Type:   Admission Type: Involuntary    Reason for admission:  Reason for Admission: Dementia with Behavioral Disturbance     Patient Diagnosis:   Subcortical vascular dementia with behavioral disturbance ([facility]     PATIENT STRENGTHS:  Patient Strengths Strengths: Positive Support  Patient Strengths and Limitations:Limitations: Unrealistic self-view, General negative or hopeless attitude about future/recovery  Addictive Behavior:Addictive Behavior  In the past 3 months, have you felt or has someone told you that you have a problem with:  : (denies at admission)  Do you have a history of Chemical Use?: No  Do you have a history of Alcohol Use?: No  Do you have a history of Street Drug Abuse?: No  Histroy of Prescripton Drug Abuse?: No  Medical Problems:  Past Medical History:   Diagnosis Date    Dilated cardiomyopathy ([facility]     Grade I diastolic dysfunction 43/45/5322    Hyperlipidemia     Hypertension     Hypothyroidism     Ischemic cardiomyopathy     Mild aortic insufficiency 05/14/2019    Mild left ventricular hypertrophy 05/14/2019    Mild mitral regurgitation 05/14/2019    Myelodysplastic disease ([facility]     Non-rheumatic mitral regurgitation     PAF (paroxysmal atrial fibrillation) ([facility]     Subcortical vascular dementia with behavioral disturbance ([facility] 07/10/2020       Risk:  Fall Risk Total: 14  Jl Scale Jl Scale Score: 17  BVC Total: 2    Status EXAM:   Status and Exam  Normal: No  Facial Expression: Flat  Affect: Blunt  Level of Consciousness: Confused  Mood:Normal: No  Mood: Empty  Motor Activity:Normal: No  Motor Activity: Decreased  Interview Behavior: Cooperative  Preception: [place] to Person  Attention:Normal: No  Attention: Unable to Concentrate, Distractible  Thought Processes: Loose Assoc.   Thought Content:Normal: No  Thought Content: Poverty of Content  Hallucinations: Unable to assess  Delusions: Yes  Delusions: Other(See Comment)(Looks around the unit for her daughters)  Memory:Normal: No  Memory: Poor Recent, Poor Remote  Insight and Judgment: No  Insight and Judgment: Poor Judgment, Poor Insight  Present Suicidal Ideation: No  Present Homicidal Ideation: No    Daily Assessment Last Entry:   Daily Sleep (WDL): Within Defined Limits  Patient Currently in Pain: Yes  Daily Nutrition (WDL): Exceptions to WDL  Appetite Change: Decreased  Barriers to Nutrition: Elderly patient, Cognitive impairment  Level of Assistance: Needs encouragement    Patient Monitoring:  Frequency of Checks: 4 times per hour, close    Psychiatric Symptoms:   Depression Symptoms  Depression Symptoms: Impaired concentration  Anxiety Symptoms  Anxiety Symptoms: Other (Comment)(MITESH, none observed)  Jacqueline Symptoms  Jacqueline Symptoms: Poor judgment, Labile     Psychosis Symptoms  Delusion Type: Other (Comment)(MITESH)    Suicide Risk CSSR-S:  1) Within the past month, have you wished you were dead or wished you could go to sleep and not wake up? : No  2) Have you actually had any thoughts of killing yourself? : No  3) Have you been thinking about how you might kill yourself? : No  4) Have you had these thoughts and had some intention of acting on them? : No  5) Have you started to work out or worked out the details of how to kill yourself?  Do you intend to carry out this plan? : No  6) Have you ever done anything, started to do anything, or prepared to do anything to end your life?: No      EDUCATION:   Learner Progress Toward Treatment Goals: Reviewed goals and plan of care    Method: Group    Outcome: No evidence of Learning    PATIENT GOALS: Med titration, compliance with unit programming    PLAN/TREATMENT RECOMMENDATIONS UPDATE: Med titration    Estimated Length of Stay Update:  7 days  Estimated Discharge Date Update: 07/20/20  Discharge criteria: Med titration      SHORT-TERM GOALS UPDATE: Time frame for Short-Term Goals: 7 days    LONG-TERM GOALS UPDATE:   Time frame for Long-Term Goals: 7 days  Members Present in Team Meeting: See Dayna Collins MSW, LSW

## 2020-07-14 NOTE — PROGRESS NOTES
Psychiatric Progress Note    Jose Campuzano  [id number]  07/14/20    CHIEF COMPLAINT: \"I don't know. \"    HPI: Jose Campuzano is an 80year old  female with PMH of dementia, depression, HTN, Hypothyroidism, CAD and myelodysplastic disease. Patient presented to [facility] ED after becoming aggressive at home. Per ED notes patient attempted to break windows at home with her cane, threatened suicide and homicide. Psychiatry was consulted by Shannon Dunn CNP for suicide and homicide attempt.     During today's interview the patient is alert and oriented to self only. She is not aware of why she is in the hospital. She denies SI/HI. Denies auditory and visual hallucinations. Denies depression or anxiety. States she lives alone and her daughters help her out at home because they \"want to not that she needs it. \" She states she is sleeping \"OK\" but that her appetite is \"poor. \" Pt noted she currently feels safe and comfortable on the unit. Pt was in agreement with treatment team. She did refuse some of her medications last night but has been compliant with treatment the majority of the time. Tolerating medications without side effect complaints. Pt was polite and cordial during the interview process. Pt noted she enjoys the unit and feels safe and comfortable. Patient's daughters are seeking guardianship of the patient due to her advanced dementia.        Allergies   Allergen Reactions    Ibuprofen Other (See Comments)     Unknown     Pcn [Penicillins]        Medications Prior to Admission: diphenhydrAMINE (BENADRYL) 50 MG/ML injection, Inject 25 mg into the muscle every 6 hours as needed  enoxaparin (LOVENOX) 30 MG/0.3ML injection, Inject 30 mg into the skin daily  haloperidol (HALDOL) 0.5 MG tablet, Take 0.5 mg by mouth 4 times daily  docusate sodium (COLACE, DULCOLAX) 100 MG CAPS, Take 100 mg by mouth 2 times daily  amiodarone (CORDARONE) 200 MG tablet, Take 1 tablet by mouth daily  carvedilol (COREG) 25 MG tablet, Take 12.5 mg by mouth 2 times daily (with meals)   levothyroxine (SYNTHROID) 75 MCG tablet, Take 75 mcg by mouth Daily. aspirin 81 MG tablet, Take 81 mg by mouth daily. cefdinir (OMNICEF) 300 MG capsule, Take 1 capsule by mouth 2 times daily for 5 days  QUEtiapine (SEROQUEL) 25 MG tablet, Take 50 mg by mouth nightly  vitamin B-12 (CYANOCOBALAMIN) 1000 MCG tablet, Take 1,000 mcg by mouth daily  losartan (COZAAR) 50 MG tablet, Take 50 mg by mouth daily  MAGNESIUM-ZINC PO, Take 1 tablet by mouth daily. Past Medical History:   Diagnosis Date    Dilated cardiomyopathy ([address]     Grade I diastolic dysfunction 42/62/7283    Hyperlipidemia     Hypertension     Hypothyroidism     Ischemic cardiomyopathy     Mild aortic insufficiency 05/14/2019    Mild left ventricular hypertrophy 05/14/2019    Mild mitral regurgitation 05/14/2019    Myelodysplastic disease ([address]     Non-rheumatic mitral regurgitation     [facility]     Subcortical vascular dementia with behavioral disturbance ([address] 07/10/2020        Patient Active Problem List   Diagnosis    Closed displaced fracture of left femoral neck ([address]    Thyroid disease    Hypertension    CAD (coronary artery disease)    Myelodysplastic disease ([address]    Dementia, in, senility, with behavioral disturbance ([address]    Acute metabolic encephalopathy    Subcortical vascular dementia with behavioral disturbance ([address]    Hypothyroidism    Non-rheumatic mitral regurgitation    Hyperlipidemia    Grade I diastolic dysfunction    Mild left ventricular hypertrophy    Mild aortic insufficiency    Mild mitral regurgitation       Review of Systems    OBJECTIVE  Vital Signs:  Vitals:    07/14/20 0749   BP: 132/74   Pulse: 79   Resp: 16   Temp: 97.2 °F (36.2 °C)   SpO2: 97%       Labs:  No results found for this or any previous visit (from the past 48 hour(s)).     PSYCHIATRIC ASSESSMENT / MENTAL STATUS EXAM:   Vitals: Blood pressure 132/74, pulse 79, temperature 97.2 °F (36.2 °C), temperature source Temporal, resp. rate 16, SpO2 97 %. CONSTITUTIONAL:    Appearance: Appears stated age. Alert and oriented to person. No acute distress. Adequate grooming and hygiene. Good eye contact. No prominent physical abnormalities. Attitude: Manner is uncooperative yet redirectable  Motor: No psychomotor agitation, retardation or abnormal movements noted  Speech: Clearly articulated; normal rate, volume, tone & amount. Language: intact understanding and production  Mood: euthymic  Affect: euthymic, full range, non-labile, congruent with mood and content of speech  Thought Production: Spontaneous. Thought Form:   linear, logical & goal-directed. No tangentiality or circumstantiality. No flight of ideas or loosening of associations. Thought Content/Perceptions: No SKYLER, no AVH, no delusion  Insight: impaired  Judgment: impaired  Memory: Immediate, recent, and remote appear impaired, though not formally tested. Attention: maintained throughout interview  Fund of knowledge: Average  Gait/Balance: unsteady with shortened stride    IMPRESSION:   Subcortical vascular dementia with behavioral disturbance    PLAN:  Psychiatric management:medication initiation and titration, group and individual therapy, safe and therapeutic environment. Status of problem/condition: Improving  Medical co-morbidities: Management per [facility] group, appreciate assistance  Legal Status: Pending  Disposition:estimated LOS: Pending. The treatment team reviewed with the patient the diagnosis and treatment recommendations to include the risks, benefits, and side effects of chosen medications. The patient verbalized understanding and agreed with the treatment regimen as outlined above. The patient was encouraged to participate in groups.   Medical records, Labs, Diagnotic tests reviewed  q15 min safety checks for safety  Interval History  Review current labs  Continue current medications - start marinol for poor appetite  Supportive Therapy Provided  Pt had an opportunity to ask questions and address concerns  Pt encouraged to continue therapy group or individual.  Pt was in agreement with treatment plan. The risks benefits and side effects of medications were discussed with the patient, including alternatives and no treatment.     Electronically signed by CELI Cardoza CNP on 7/14/2020 at 11:12 AM

## 2020-07-14 NOTE — GROUP NOTE
Group Therapy Note    Date: 7/14/2020    Group Start Time: 0830  Group End Time: 0900    Number of Participants: 4/5    Type: Morning Goals Group/ Community Meeting    Group Topic/Objective: Set Goal For The Day and to review Unit Rules and Regulations. Patient's Goal:  Pt expressed interest in taking a shower. Notes:  Pt minimally engaged in group. Pt did answer goals questions, but shut down when going over her menu. Pt presented as overwhelmed.      Depression (0-10): 0    Anxiety (0-10): 0    Irritability/Aggitation (0-10): 0    Status After Intervention:  Improved    Participation Level: Interactive    Participation Quality: Sharing    Speech:  hesitant    Thought Process/Content: Logical    Affective Functioning: Blunted    Mood: Blunted    Level of consciousness:  Alert    Response to Learning: Able to verbalize current knowledge/experience    Endings: None Reported    Modes of Intervention: Education, Support and Socialization    Discipline Responsible: Certified Therapeutic Recreation Specialist     Electronically signed by Mario Alberto Matias MA on 7/14/2020 at 9:37 AM

## 2020-07-15 PROCEDURE — 97116 GAIT TRAINING THERAPY: CPT

## 2020-07-15 PROCEDURE — 97161 PT EVAL LOW COMPLEX 20 MIN: CPT

## 2020-07-15 PROCEDURE — 1240000000 HC EMOTIONAL WELLNESS R&B

## 2020-07-15 PROCEDURE — 99233 SBSQ HOSP IP/OBS HIGH 50: CPT | Performed by: NURSE PRACTITIONER

## 2020-07-15 PROCEDURE — 6370000000 HC RX 637 (ALT 250 FOR IP): Performed by: NURSE PRACTITIONER

## 2020-07-15 RX ADMIN — CARVEDILOL 12.5 MG: 6.25 TABLET, FILM COATED ORAL at 08:52

## 2020-07-15 RX ADMIN — ACETAMINOPHEN ORAL SOLUTION 650 MG: 650 SOLUTION ORAL at 10:02

## 2020-07-15 RX ADMIN — DRONABINOL 2.5 MG: 2.5 CAPSULE ORAL at 17:04

## 2020-07-15 RX ADMIN — LORAZEPAM 1 MG: 1 TABLET ORAL at 21:39

## 2020-07-15 RX ADMIN — LOSARTAN POTASSIUM 50 MG: 50 TABLET ORAL at 08:52

## 2020-07-15 RX ADMIN — DRONABINOL 2.5 MG: 2.5 CAPSULE ORAL at 12:00

## 2020-07-15 RX ADMIN — LEVOTHYROXINE SODIUM 75 MCG: 0.07 TABLET ORAL at 08:52

## 2020-07-15 RX ADMIN — ASPIRIN 81 MG 81 MG: 81 TABLET ORAL at 08:52

## 2020-07-15 RX ADMIN — QUETIAPINE FUMARATE 50 MG: 50 TABLET ORAL at 21:33

## 2020-07-15 RX ADMIN — HALOPERIDOL 5 MG: 2 SOLUTION ORAL at 21:39

## 2020-07-15 RX ADMIN — LORAZEPAM 1 MG: 1 TABLET ORAL at 15:16

## 2020-07-15 RX ADMIN — DIPHENHYDRAMINE HYDROCHLORIDE 25 MG: 12.5 SOLUTION ORAL at 21:39

## 2020-07-15 RX ADMIN — CEFDINIR 300 MG: 300 CAPSULE ORAL at 21:33

## 2020-07-15 RX ADMIN — DONEPEZIL HYDROCHLORIDE 5 MG: 5 TABLET, FILM COATED ORAL at 21:34

## 2020-07-15 RX ADMIN — DIPHENHYDRAMINE HYDROCHLORIDE 25 MG: 12.5 SOLUTION ORAL at 17:04

## 2020-07-15 RX ADMIN — AMIODARONE HYDROCHLORIDE 200 MG: 200 TABLET ORAL at 08:52

## 2020-07-15 RX ADMIN — CEFDINIR 300 MG: 300 CAPSULE ORAL at 08:52

## 2020-07-15 RX ADMIN — Medication 200 MG: at 08:53

## 2020-07-15 RX ADMIN — CYANOCOBALAMIN TAB 1000 MCG 1000 MCG: 1000 TAB at 08:52

## 2020-07-15 RX ADMIN — CARVEDILOL 12.5 MG: 6.25 TABLET, FILM COATED ORAL at 17:04

## 2020-07-15 RX ADMIN — DOCUSATE SODIUM 100 MG: 50 LIQUID ORAL at 08:52

## 2020-07-15 ASSESSMENT — PAIN SCALES - GENERAL
PAINLEVEL_OUTOF10: 0
PAINLEVEL_OUTOF10: 8
PAINLEVEL_OUTOF10: 2
PAINLEVEL_OUTOF10: 0

## 2020-07-15 ASSESSMENT — PAIN SCALES - PAIN ASSESSMENT IN ADVANCED DEMENTIA (PAINAD)
CONSOLABILITY: 0
BODYLANGUAGE: 0
NEGVOCALIZATION: 0
BREATHING: 0
FACIALEXPRESSION: 0
TOTALSCORE: 0

## 2020-07-15 NOTE — PLAN OF CARE
Problem: Falls - Risk of:  Goal: Will remain free from falls  Description: Will remain free from falls  7/15/2020 0036 by Jazmin Calhoun RN  Outcome: Ongoing  7/14/2020 1115 by Zaid Melissa RN  Outcome: Ongoing  Goal: Absence of physical injury  Description: Absence of physical injury  7/15/2020 0036 by Jazmin Calhoun RN  Outcome: Ongoing  7/14/2020 1115 by Zaid Melissa RN  Outcome: Ongoing     Problem: Anger Management/Homicidal Ideation:  Goal: Able to display appropriate communication and problem solving  Description: Able to display appropriate communication and problem solving  7/15/2020 0036 by Jazmin Calhoun RN  Outcome: Ongoing  7/14/2020 1115 by Zaid Melissa RN  Outcome: Ongoing  Goal: Ability to verbalize frustrations and anger appropriately will improve  Description: Ability to verbalize frustrations and anger appropriately will improve  7/15/2020 0036 by Jazmin Calhoun RN  Outcome: Ongoing  7/14/2020 1115 by Zaid Melissa RN  Outcome: Ongoing  Goal: Absence of angry outbursts  Description: Absence of angry outbursts  7/15/2020 0036 by Jazmin Calhoun RN  Outcome: Ongoing  7/14/2020 1115 by Zaid Melissa RN  Outcome: Ongoing     Problem: Altered Mood, Depressive Behavior:  Goal: Able to verbalize and/or display a decrease in depressive symptoms  Description: Able to verbalize and/or display a decrease in depressive symptoms  7/15/2020 0036 by Jazmin Calhoun RN  Outcome: Ongoing  7/14/2020 1115 by Zaid Melissa RN  Outcome: Ongoing     Problem: Altered Mood, Deterioration in Function:  Goal: Maintenance of adequate nutrition will improve  Description: Maintenance of adequate nutrition will improve  7/15/2020 0036 by Jazmin Calhoun RN  Outcome: Ongoing  7/14/2020 1115 by Zaid Melissa RN  Outcome: Ongoing     Problem: Altered Mood, Manic Behavior:  Goal: Able to sleep  Description: Able to sleep  7/15/2020 0036 by Jazmin Calhoun RN  Outcome: Ongoing  7/14/2020 1115 by Kenny Jansen RN  Outcome: Ongoing  Goal: Mood stable  Description: Mood stable  7/15/2020 0036 by Dory Arora RN  Outcome: Ongoing  7/14/2020 1115 by Kenny Jansen RN  Outcome: Ongoing     Problem: Skin Integrity:  Goal: Will show no infection signs and symptoms  Description: Will show no infection signs and symptoms  7/15/2020 0036 by Dory Arora RN  Outcome: Ongoing  7/14/2020 1115 by Kenny Jansen RN  Outcome: Ongoing  Goal: Absence of new skin breakdown  Description: Absence of new skin breakdown  7/15/2020 0036 by Dory Arora RN  Outcome: Ongoing  7/14/2020 1115 by Kenny Jansen RN  Outcome: Ongoing     Problem: Pain:  Goal: Pain level will decrease  Description: Pain level will decrease  7/15/2020 0036 by Dory Arora RN  Outcome: Ongoing  7/14/2020 1115 by Kenny Jansen RN  Outcome: Ongoing  Goal: Control of acute pain  Description: Control of acute pain  7/15/2020 0036 by Dory Arora RN  Outcome: Ongoing  7/14/2020 1115 by Kenny Jansen RN  Outcome: Ongoing  Goal: Control of chronic pain  Description: Control of chronic pain  7/15/2020 0036 by oDry Arora RN  Outcome: Ongoing  7/14/2020 1115 by Kenny Jansen RN  Outcome: Ongoing

## 2020-07-15 NOTE — GROUP NOTE
Group Therapy Note    Date: 7/15/2020    Group Start Time: 0830  Group End Time: 0900    Number of Participants: 6/6    Type: Morning Goals Group/ Community Meeting    Group Topic/Objective: Set Goal For The Day and to review Unit Rules and Regulations. Patient's Goal:  Pt unable to state a goal at this time. Notes:  Pt attended group, but spent majority of time sleep. Depression (0-10): Pt unable to answer at this time. Anxiety (0-10): Pt unable to answer at this time. Irritability/Aggitation (0-10): Pt unable to answer at this time. Status After Intervention:  Unchanged    Participation Level: None    Participation Quality: Lethargic    Speech:  normal    Thought Process/Content: Unable to determine d/t pt sleeping.      Affective Functioning: Blunted    Mood: Blunted    Level of consciousness:  Drowsy    Response to Learning: Able to verbalize current knowledge/experience    Endings: None Reported    Modes of Intervention: Education, Support and Socialization    Discipline Responsible: Certified Therapeutic Recreation Specialist     Electronically signed by Mario Alberto Painting MA on 7/15/2020 at 9:36 AM

## 2020-07-15 NOTE — PROGRESS NOTES
Physical Therapy    Facility/Department: [facility] GERIATRIC PSYCH UNIT  Initial Assessment    NAME: Esther Ramirez  : 10/2/1931  MRN: [id number]    Date of Service: 7/15/2020    Discharge Recommendations:  Subacute/Skilled Nursing Facility        Assessment   Body structures, Functions, Activity limitations: Decreased functional mobility ; Decreased high-level IADLs;Decreased endurance;Decreased strength  Treatment Diagnosis: impaired mobility  Prognosis: Good  Decision Making: Low Complexity  History: no PF  Exam: strength/ gait  Clinical Presentation: changing characterstics of function due to weakness  Barriers to Learning: none  REQUIRES PT FOLLOW UP: Yes  Activity Tolerance  Activity Tolerance: Patient Tolerated treatment well;Patient limited by fatigue       Patient Diagnosis(es): There were no encounter diagnoses. has a past medical history of Dilated cardiomyopathy ([address] Grade I diastolic dysfunction, Hyperlipidemia, Hypertension, Hypothyroidism, Ischemic cardiomyopathy, Mild aortic insufficiency, Mild left ventricular hypertrophy, Mild mitral regurgitation, Myelodysplastic disease ([address] Non-rheumatic mitral regurgitation, PAF (paroxysmal atrial fibrillation) ([address] and Subcortical vascular dementia with behavioral disturbance ([address] has a past surgical history that includes HEMIARTHROPLASTY HIP (Left, 2019).     Restrictions  Position Activity Restriction  Other position/activity restrictions: fsll risk  Vision/Hearing  Vision Exceptions: Wears glasses at all times  Hearing: Within functional limits     Subjective  General  Chart Reviewed: Yes  Family / Caregiver Present: No          Orientation  Orientation  Overall Orientation Status: Within Normal Limits  Social/Functional History  Social/Functional History  Lives With: Alone  Type of Home: House  Home Layout: One level  Home Access: Stairs to enter with rails  Bathroom Shower/Tub: Tub/Shower unit  Bathroom Toilet: Standard  Bathroom Equipment: Shower chair  Home Equipment: [address] Help From: Family  ADL Assistance: Independent  Homemaking Assistance: (Pt states she has been independent)  Homemaking Responsibilities: No  Ambulation Assistance: Independent  Transfer Assistance: Independent  Active : No  Education: [employer]  Occupation: Retired  Leisure & Hobbies: Cooking  Cognition        Objective     Observation/Palpation  Posture: Poor    PROM RLE (degrees)  RLE General PROM: knee FLEX to ~ 110* otherwise WFL  PROM LLE (degrees)  LLE General PROM: knee FLEX to ~ 110* otherwise WFL  Strength RLE  Comment: grossly @ least 3 to 3+/5  Strength LLE  Comment: grossly @ least 3 to 3+/5        Bed mobility  Rolling to Left: Contact guard assistance  Rolling to Right: Contact guard assistance  Supine to Sit: Contact guard assistance  Sit to Supine: Contact guard assistance  Transfers  Sit to Stand: Minimal Assistance  Stand to sit: Minimal Assistance  Ambulation  Ambulation?: Yes  Ambulation 1  Surface: level tile  Device: Rolling Walker  Assistance: Contact guard assistance  Gait Deviations: Slow Sadia;Decreased step length;Shuffles  Distance: 25 ft              Plan   Plan  Times per week: 3+  Current Treatment Recommendations: Strengthening, Transfer Training, Endurance Training, ADL/Self-care Training, Neuromuscular Re-education, IADL Training, Gait Training  Safety Devices  Type of devices: Left in chair, Gait belt, Nurse notified    G-Code       OutComes Score                                                  AM-PAC Score           Basic Mobility Six Clicks Form [facility] AM-PAC Score Conversion Table   How much difficulty does the patient currently have Unable   (pt is unable to do activity) A Lot   (activity is a struggle, requires great effort/time) A Little   (pt can manage, but takes more effort/time than should) None   (pt has no difficulty) Raw Score Standardized Score CMS -100% Score CMS Modifier        6 23.55 100% CN Turning over in bed (including adjusting bedclothes, sheets, and blankets)? []1 []2  [x]3  []4  7 26.42 92.36% CM        8 28.58 86.62% CM   Sitting down on and standing up from a chair with arms (e.g. wheelchair, bedside commode, etc.)? []1 []2 [x]3   []4   9 30.55 81.38% CM        10 32.29 76.75% CL   Moving from lying on back to sitting on the side of the bed? []1 []2  [x]3   []4   11 33.86 72.57% CL        12 35.33 68.66% CL   How much help from another person does the patient currently need Total   (Total/Dependent Assist) A Lot   (Max/Mod Assist) A Little   (Min/CGA/Supervision) None   (No human assistance) 13 36.74 64.91% CL        14 38.1 61.29% CL   Moving to and from a bed to a chair (including a wheelchair)? []1  [x]2   []3  []4   15 39.45 57.70% CK        16 40.78 54.16% CK   To walk in a hospital room? []1 [x]2   []3    []4  17 42.13 50.57% CK        18 43.63 46.58% CK   Climbing 3-5 steps with a railing?  [x]1  []2   []3    []4  19 45.44 41.77% CK        20 47.67 35.83% CJ   Raw Score 14  21 50.25 28.97% CJ   Standardized Score   22 53.28 20.91% CJ   CMS 0-100% Score   23 56.93 11.20% CI   CMS Modifier  24 61.14 0.00% CH     CH = 0% impaired  CI = 1-20% impaired  CJ = 20-40% impaired  CK = 40-60% impaired  CL = 60-80% impaired  CM = % impaired  CN = 100% impaired       Goals  Short term goals  Time Frame for Short term goals: 7 days  Short term goal 1: S for all bed mobility  Short term goal 2: SBA for all transfers  Short term goal 3: Ambulate 150 ft with RW and CGA       Therapy Time   Individual Concurrent Group Co-treatment   Time In 0920         Time Out 0950         Minutes 30            Variance: 15    BETHANIE Ren, PT

## 2020-07-15 NOTE — BH NOTE
Pt continues to be restless, attempts to get up frequently. Ativan 1mg po given, with Tylenol 650mg po for hip pain bilat. Repositioned for comfort.

## 2020-07-15 NOTE — PROGRESS NOTES
Physical Therapy    Facility/Department: [facility] PSYCH UNIT  Initial Assessment    NAME: Laina Maher  : 10/2/1931  MRN: [id number]    Date of Service: 7/15/2020    Discharge Recommendations:           Assessment   Body structures, Functions, Activity limitations: Decreased functional mobility ; Decreased high-level IADLs;Decreased endurance;Decreased strength  Activity Tolerance  Activity Tolerance: Patient Tolerated treatment well;Patient limited by fatigue       Patient Diagnosis(es): There were no encounter diagnoses. has a past medical history of Dilated cardiomyopathy ([address] Grade I diastolic dysfunction, Hyperlipidemia, Hypertension, Hypothyroidism, Ischemic cardiomyopathy, Mild aortic insufficiency, Mild left ventricular hypertrophy, Mild mitral regurgitation, Myelodysplastic disease ([address] Non-rheumatic mitral regurgitation, PAF (paroxysmal atrial fibrillation) ([address] and Subcortical vascular dementia with behavioral disturbance ([address] has a past surgical history that includes HEMIARTHROPLASTY HIP (Left, 2019).     Restrictions  Position Activity Restriction  Other position/activity restrictions: fsll risk  Vision/Hearing  Vision Exceptions: Wears glasses at all times  Hearing: Within functional limits     Subjective  General  Chart Reviewed: Yes  Family / Caregiver Present: No          Orientation  Orientation  Overall Orientation Status: Within Normal Limits  Social/Functional History  Social/Functional History  Lives With: Alone  Type of Home: House  Home Layout: One level  Home Access: Stairs to enter with rails  Bathroom Shower/Tub: Tub/Shower unit  Bathroom Toilet: Standard  Bathroom Equipment: Shower chair  Home Equipment: [employer] Help From: Family  ADL Assistance: Independent  Homemaking Assistance: (Pt states she has been independent)  Homemaking Responsibilities: No  Ambulation Assistance: Independent  Transfer Assistance: Independent  Active : No  Education: High School  Occupation: Retired  Leisure & Hobbies: Cooking  Cognition        Objective     Observation/Palpation  Posture: Poor    PROM RLE (degrees)  RLE General PROM: knee FLEX to ~ 110* otherwise WFL  PROM LLE (degrees)  LLE General PROM: knee FLEX to ~ 110* otherwise WFL  Strength RLE  Comment: grossly @ least 3 to 3+/5  Strength LLE  Comment: grossly @ least 3 to 3+/5        Bed mobility  Rolling to Left: Contact guard assistance  Rolling to Right: Contact guard assistance  Supine to Sit: Contact guard assistance  Sit to Supine: Contact guard assistance  Transfers  Sit to Stand: Minimal Assistance  Stand to sit: Minimal Assistance  Ambulation  Ambulation?: Yes  Ambulation 1  Surface: level tile  Device: Rolling Walker  Assistance: Contact guard assistance  Gait Deviations: Slow Sadia;Decreased step length;Shuffles  Distance: 25 ft              Plan   Plan  Times per week: 3+  Current Treatment Recommendations: Strengthening, Transfer Training, Endurance Training, ADL/Self-care Training, Neuromuscular Re-education, IADL Training, Gait Training  Safety Devices  Type of devices: Left in chair, Gait belt, Nurse notified    G-Code       OutComes Score                                                  AM-PAC Score             Goals  Short term goals  Time Frame for Short term goals: 7 days  Short term goal 1: S for all bed mobility  Short term goal 2: SBA for all transfers  Short term goal 3: Ambulate 150 ft with RW and CGA       Therapy Time   Individual Concurrent Group Co-treatment   Time In 0920         Time Out 0950         Minutes 30            Variance: Moncho Jimenez, PT

## 2020-07-15 NOTE — PROGRESS NOTES
Patient appears to be having visual hallucinations due to her calling to an animal on the floor. Patient leans over and calls \"come here you\", waving her hand toward her lap. Patient repositioned on the couch with blanket and a pillow behind her back.

## 2020-07-15 NOTE — PROGRESS NOTES
by mouth Daily. aspirin 81 MG tablet, Take 81 mg by mouth daily. cefdinir (OMNICEF) 300 MG capsule, Take 1 capsule by mouth 2 times daily for 5 days  QUEtiapine (SEROQUEL) 25 MG tablet, Take 50 mg by mouth nightly  vitamin B-12 (CYANOCOBALAMIN) 1000 MCG tablet, Take 1,000 mcg by mouth daily  losartan (COZAAR) 50 MG tablet, Take 50 mg by mouth daily  MAGNESIUM-ZINC PO, Take 1 tablet by mouth daily. Past Medical History:   Diagnosis Date    Dilated cardiomyopathy ([address]     Grade I diastolic dysfunction 84/90/1620    Hyperlipidemia     Hypertension     Hypothyroidism     Ischemic cardiomyopathy     Mild aortic insufficiency 05/14/2019    Mild left ventricular hypertrophy 05/14/2019    Mild mitral regurgitation 05/14/2019    Myelodysplastic disease ([address]     Non-rheumatic mitral regurgitation     PAF (paroxysmal atrial fibrillation) (HCC)     Subcortical vascular dementia with behavioral disturbance ([address] 07/10/2020        Patient Active Problem List   Diagnosis    Closed displaced fracture of left femoral neck ([address]    Thyroid disease    Hypertension    CAD (coronary artery disease)    Myelodysplastic disease ([address]    Dementia, in, senility, with behavioral disturbance ([address]    Acute metabolic encephalopathy    Subcortical vascular dementia with behavioral disturbance ([address]    Hypothyroidism    Non-rheumatic mitral regurgitation    Hyperlipidemia    Grade I diastolic dysfunction    Mild left ventricular hypertrophy    Mild aortic insufficiency    Mild mitral regurgitation       Review of Systems    OBJECTIVE  Vital Signs:  Vitals:    07/15/20 0738   BP: 139/76   Pulse: 77   Resp: 16   Temp: 98.3 °F (36.8 °C)   SpO2: 96%       Labs:  No results found for this or any previous visit (from the past 48 hour(s)). PSYCHIATRIC ASSESSMENT / MENTAL STATUS EXAM:   Vitals: Blood pressure 139/76, pulse 77, temperature 98.3 °F (36.8 °C), temperature source Temporal, resp.  rate 16, SpO2 96 %. CONSTITUTIONAL:    Appearance: Appears stated age. Alert and oriented to person. No acute distress. Adequate grooming and hygiene. Good eye contact. No prominent physical abnormalities. Attitude: Manner is uncooperative yet redirectable  Motor: No psychomotor agitation, retardation or abnormal movements noted  Speech: Clearly articulated; normal rate, volume, tone & amount. Language: intact understanding and production  Mood: euthymic  Affect: euthymic, full range, non-labile, congruent with mood and content of speech  Thought Production: Spontaneous. Thought Form:   linear, logical & goal-directed. No tangentiality or circumstantiality. No flight of ideas or loosening of associations. Thought Content/Perceptions: No SKYLER, no AVH, no delusion  Insight: impaired  Judgment: impaired  Memory: Immediate, recent, and remote appear impaired, though not formally tested. Attention: maintained throughout interview  Fund of knowledge: Average  Gait/Balance: unsteady with shortened stride    IMPRESSION:   Subcortical vascular dementia with behavioral disturbance    PLAN:  Psychiatric management:medication initiation and titration, group and individual therapy, safe and therapeutic environment. Status of problem/condition: Improving  Medical co-morbidities: Management per [facility] group, appreciate assistance  Legal Status: Pending  Disposition:estimated LOS: Pending. The treatment team reviewed with the patient the diagnosis and treatment recommendations to include the risks, benefits, and side effects of chosen medications. The patient verbalized understanding and agreed with the treatment regimen as outlined above. The patient was encouraged to participate in groups.   Medical records, Labs, Diagnotic tests reviewed  q15 min safety checks for safety  Interval History  Review current labs  Continue current medications - start marinol for poor appetite  Supportive Therapy Provided  Pt had an opportunity to ask questions and address concerns  Pt encouraged to continue therapy group or individual.  Pt was in agreement with treatment plan. The risks benefits and side effects of medications were discussed with the patient, including alternatives and no treatment.     Electronically signed by CELI Cardoza CNP on 7/15/2020 at 10:45 AM

## 2020-07-15 NOTE — PROGRESS NOTES
Patient has remained restless and unable to sit still requiring her to be 1:1 for safety. Patient taken to her room and changed into pajamas, kishore care done and new brief put on and patient is in bed with PCT at bedside.

## 2020-07-15 NOTE — PROGRESS NOTES
Attempted 1:1 with pt at ~1500. Pt would engage in painting activity. Pt would engage in activity for several seconds, but then became distracted. After ~15 minutes, pt taken to room to utilize the restroom and then put in [employer].      Electronically signed by KARIN Chiang MA on 7/15/2020 at 3:17 PM

## 2020-07-15 NOTE — BH NOTE
Pt alert to name only, denies anxiety and depression, denies SI and AVH. Slept most of shift after prn ativan.

## 2020-07-15 NOTE — GROUP NOTE
Group Therapy Note    Date: 7/15/2020    Group Start Time: 6374  Group End Time: 1125  Group Topic: Psychoeducation    [address], [place]        Group Therapy Note    Attendees: 6/6       Notes:  Pts participated in recreational therapy activities; [employer] and [employer]. In the first activity, pts practiced finding something positive in life even when annoyed by things. In the second activity, pts identified ways that they could help make others happy. Discussion centered around how the activities benefited their mood and positive thinking. Pt attended group, but unable to participate d/t confusion and agitation. Pt required Max prompting/redirection to stay sitting in her chair.      Status After Intervention:  Unchanged    Participation Level: None    Participation Quality: Inappropriate and Resistant      Speech:  normal      Thought Process/Content: Confused      Affective Functioning: Congruent      Mood: irritable      Level of consciousness:  Preoccupied and Inattentive      Response to Learning: Resistant      Endings: None Reported    Modes of Intervention: Support, Socialization, Exploration and Activity      Discipline Responsible: Certified Therapeutic Recreation Specialist       Signature:  Mishel Rodriguez

## 2020-07-15 NOTE — PROGRESS NOTES
Patient appears agitated requiring repeated redirection. Patient given 1mg of Ativan po in chocolate pudding. Patient is currently sitting on th couch.

## 2020-07-15 NOTE — PROGRESS NOTES
Patient continues to be restless and trying to get up on her own. Patient given 25mg of Benadryl with 5mg of Haldol liquid. Patient is now sitting in rocker with blanket watching TV. PCT at patient side.

## 2020-07-15 NOTE — PROGRESS NOTES
Patient is alert and oriented to name. Patient was tearful and has weakness this morning. Patient has to be redirected to not stand up. Patient uses wheelchair and walker to ambulate. Patient was med compliant. Patient is social with peers. No issues at this time.

## 2020-07-16 PROCEDURE — 6370000000 HC RX 637 (ALT 250 FOR IP): Performed by: PSYCHIATRY & NEUROLOGY

## 2020-07-16 PROCEDURE — 1240000000 HC EMOTIONAL WELLNESS R&B

## 2020-07-16 PROCEDURE — 6370000000 HC RX 637 (ALT 250 FOR IP): Performed by: NURSE PRACTITIONER

## 2020-07-16 PROCEDURE — 99232 SBSQ HOSP IP/OBS MODERATE 35: CPT | Performed by: PSYCHIATRY & NEUROLOGY

## 2020-07-16 RX ORDER — HALOPERIDOL 2 MG/ML
2.5 SOLUTION ORAL 2 TIMES DAILY
Status: DISCONTINUED | OUTPATIENT
Start: 2020-07-16 | End: 2020-07-19 | Stop reason: HOSPADM

## 2020-07-16 RX ADMIN — AMIODARONE HYDROCHLORIDE 200 MG: 200 TABLET ORAL at 08:15

## 2020-07-16 RX ADMIN — LEVOTHYROXINE SODIUM 75 MCG: 0.07 TABLET ORAL at 08:15

## 2020-07-16 RX ADMIN — LORAZEPAM 1 MG: 1 TABLET ORAL at 19:42

## 2020-07-16 RX ADMIN — CARVEDILOL 12.5 MG: 6.25 TABLET, FILM COATED ORAL at 19:59

## 2020-07-16 RX ADMIN — LOSARTAN POTASSIUM 50 MG: 50 TABLET ORAL at 08:15

## 2020-07-16 RX ADMIN — HALOPERIDOL 2.5 MG: 2 SOLUTION ORAL at 19:41

## 2020-07-16 RX ADMIN — DOCUSATE SODIUM 100 MG: 50 LIQUID ORAL at 08:15

## 2020-07-16 RX ADMIN — DRONABINOL 2.5 MG: 2.5 CAPSULE ORAL at 11:59

## 2020-07-16 RX ADMIN — DONEPEZIL HYDROCHLORIDE 5 MG: 5 TABLET, FILM COATED ORAL at 19:42

## 2020-07-16 RX ADMIN — ASPIRIN 81 MG 81 MG: 81 TABLET ORAL at 08:15

## 2020-07-16 RX ADMIN — QUETIAPINE FUMARATE 50 MG: 50 TABLET ORAL at 19:42

## 2020-07-16 RX ADMIN — CYANOCOBALAMIN TAB 1000 MCG 1000 MCG: 1000 TAB at 08:15

## 2020-07-16 RX ADMIN — Medication 200 MG: at 08:15

## 2020-07-16 RX ADMIN — CEFDINIR 300 MG: 300 CAPSULE ORAL at 19:42

## 2020-07-16 RX ADMIN — CEFDINIR 300 MG: 300 CAPSULE ORAL at 08:15

## 2020-07-16 RX ADMIN — CARVEDILOL 12.5 MG: 6.25 TABLET, FILM COATED ORAL at 08:15

## 2020-07-16 RX ADMIN — HALOPERIDOL 2.5 MG: 2 SOLUTION ORAL at 13:33

## 2020-07-16 ASSESSMENT — PAIN SCALES - PAIN ASSESSMENT IN ADVANCED DEMENTIA (PAINAD)
TOTALSCORE: 0
FACIALEXPRESSION: 0
BREATHING: 0
CONSOLABILITY: 0
CONSOLABILITY: 0
NEGVOCALIZATION: 0
BODYLANGUAGE: 0
FACIALEXPRESSION: 0
BREATHING: 0
TOTALSCORE: 0
NEGVOCALIZATION: 0
BODYLANGUAGE: 0

## 2020-07-16 ASSESSMENT — PAIN SCALES - GENERAL
PAINLEVEL_OUTOF10: 0
PAINLEVEL_OUTOF10: 0

## 2020-07-16 NOTE — PLAN OF CARE
Problem: Falls - Risk of:  Goal: Will remain free from falls  Description: Will remain free from falls  7/16/2020 1107 by Kali Clifford RN  Outcome: Ongoing  7/16/2020 0016 by Rachele Fung RN  Outcome: Met This Shift  Goal: Absence of physical injury  Description: Absence of physical injury  7/16/2020 1107 by Kali Clifford RN  Outcome: Ongoing  7/16/2020 0016 by Rachele Fung RN  Outcome: Met This Shift     Problem: Anger Management/Homicidal Ideation:  Goal: Able to display appropriate communication and problem solving  Description: Able to display appropriate communication and problem solving  7/16/2020 1107 by Kali Clifford RN  Outcome: Ongoing  7/16/2020 0016 by Rachele Fung RN  Outcome: Not Met This Shift  Goal: Ability to verbalize frustrations and anger appropriately will improve  Description: Ability to verbalize frustrations and anger appropriately will improve  7/16/2020 1107 by Kali Clifford RN  Outcome: Ongoing  7/16/2020 0016 by Rachele Fung RN  Outcome: Not Met This Shift  Goal: Absence of angry outbursts  Description: Absence of angry outbursts  7/16/2020 1107 by Kali Clifford RN  Outcome: Ongoing  7/16/2020 0016 by Racehle Fung RN  Outcome: Ongoing     Problem: Altered Mood, Depressive Behavior:  Goal: Able to verbalize and/or display a decrease in depressive symptoms  Description: Able to verbalize and/or display a decrease in depressive symptoms  7/16/2020 1107 by Kali Clifford RN  Outcome: Ongoing  7/16/2020 0016 by Rachele Fung RN  Outcome: Ongoing     Problem: Altered Mood, Deterioration in Function:  Goal: Maintenance of adequate nutrition will improve  Description: Maintenance of adequate nutrition will improve  7/16/2020 1107 by Kali Clifford RN  Outcome: Ongoing  7/16/2020 0016 by Rachele Fung RN  Outcome: Ongoing     Problem: Altered Mood, Manic Behavior:  Goal: Able to sleep  Description: Able to sleep  7/16/2020 1107 by Kali Clifford RN  Outcome: Ongoing  7/16/2020 0016 by Mildred Montoya RN  Outcome: Ongoing  Goal: Mood stable  Description: Mood stable  7/16/2020 1107 by Ricardo Corbett RN  Outcome: Ongoing  7/16/2020 0016 by Mildred Montoya RN  Outcome: Ongoing     Problem: Skin Integrity:  Goal: Will show no infection signs and symptoms  Description: Will show no infection signs and symptoms  7/16/2020 1107 by Ricardo Corbett RN  Outcome: Ongoing  7/16/2020 0016 by Mildred Montoya RN  Outcome: Met This Shift  Goal: Absence of new skin breakdown  Description: Absence of new skin breakdown  7/16/2020 1107 by Ricardo Corbett RN  Outcome: Ongoing  7/16/2020 0016 by Mildred Montoya RN  Outcome: Met This Shift     Problem: Pain:  Goal: Pain level will decrease  Description: Pain level will decrease  7/16/2020 1107 by Ricardo Corbett RN  Outcome: Ongoing  7/16/2020 0016 by Mildred Montoya RN  Outcome: Met This Shift  Goal: Control of acute pain  Description: Control of acute pain  7/16/2020 1107 by Ricardo Corbett RN  Outcome: Ongoing  7/16/2020 0016 by Mildred Montoya RN  Outcome: Met This Shift  Goal: Control of chronic pain  Description: Control of chronic pain  7/16/2020 1107 by Ricardo Corbett RN  Outcome: Ongoing  7/16/2020 0016 by Mildred Montoya RN  Outcome: Ongoing

## 2020-07-16 NOTE — PLAN OF CARE
Problem: Falls - Risk of:  Goal: Will remain free from falls  Description: Will remain free from falls  7/16/2020 0016 by Teri Au RN  Outcome: Met This Shift  7/15/2020 1031 by Gwen Harry RN  Outcome: Ongoing  Goal: Absence of physical injury  Description: Absence of physical injury  7/16/2020 0016 by Teri Au RN  Outcome: Met This Shift  7/15/2020 1031 by Gwen Harry RN  Outcome: Ongoing     Problem: Skin Integrity:  Goal: Will show no infection signs and symptoms  Description: Will show no infection signs and symptoms  7/16/2020 0016 by Teri Au RN  Outcome: Met This Shift  7/15/2020 1031 by Gwen Harry RN  Outcome: Ongoing  Goal: Absence of new skin breakdown  Description: Absence of new skin breakdown  7/16/2020 0016 by Teri Au RN  Outcome: Met This Shift  7/15/2020 1031 by Gwen Harry RN  Outcome: Ongoing     Problem: Pain:  Goal: Pain level will decrease  Description: Pain level will decrease  7/16/2020 0016 by Teri Au RN  Outcome: Met This Shift  7/15/2020 1031 by Gwen Harry RN  Outcome: Ongoing  Goal: Control of acute pain  Description: Control of acute pain  7/16/2020 0016 by Teri Au RN  Outcome: Met This Shift  7/15/2020 1031 by Gwen Harry RN  Outcome: Ongoing

## 2020-07-16 NOTE — GROUP NOTE
Group Therapy Note    Date: 7/16/2020    Group Start Time: 7687  Group End Time: 2029    Number of Participants: 2/3    Type: Exercise/Recreation Group    Group Topic/Objective: Chair Exercises      Notes:  Pt given opportunity to rest during group.      Discipline Responsible: Certified Therapeutic Recreation Specialist     Electronically signed by Sean Aguilera, [address], [address] [place] [address] Marianela Farfan on 7/16/2020 at 3:46 PM

## 2020-07-16 NOTE — GROUP NOTE
Group Therapy Note    Date: 7/16/2020    Group Start Time: 0364  Group End Time: 9484    Number of Participants: 3/3    Type: Spirituality    Group Topic/Objective: Religous discussion with [employer]. Notes:  Pt attended group, but did not participated. Pt required constant prompting to remain in chair.      Status After Intervention:  Unchanged    Participation Level: None    Participation Quality: Inappropriate and Resistant    Speech:  normal    Thought Process/Content: Perseverating    Affective Functioning: Congruent    Mood: irritable    Level of consciousness:  Preoccupied and Inattentive    Response to Learning: Resistant    Endings: None Reported    Modes of Intervention: Education, Support and Socialization    Discipline Responsible: Certified Therapeutic Recreation Specialist     Electronically signed by Mario Alberto Acevedo MA on 7/16/2020 at 11:17 AM

## 2020-07-16 NOTE — CARE COORDINATION
LSW received authorization from patient's insurance. Patient has been approved for inpatient stay. LSW will send clinicals every 3 days. Auth/cert screen updated to reflect. Authorization # [id number].    10:00 AM  LSW called [facility] ([phone]) to set patient follow-up.  LSW awaits call back. 12:30 PM  LSW received call back from [facility]. Patient med management telehealth set with Dr. Chetan Jimenez for 08/12/20 at 11 AM.  AVS updated to reflect. Counseling not set at this time due to shortage of counselors at agency. When patient makes this appointment, the practitioner will ask if she would like counseling services. If so, a referral will be made at that time. 2:00 PM  LSW called [facility] ([phone]) to check on patient concurrent reviews. Message left on confidential voicemail and LSW awaits call back.

## 2020-07-16 NOTE — PROGRESS NOTES
Pt's daughter, Mira Holder, called to check on pt. Mira Holder again stated that her and her sister do not want the pt to go to a ECF, even for PT/OT. Mira Holder does state that they are interested in [facility] with PT/OT. Calista Lindsey, aware.

## 2020-07-16 NOTE — PROGRESS NOTES
Pt seen by therapist for 1:1 session from approximately [phone]. Pt unable to engage in conversation or activity for long d/t confusion and irritable. Pt required frequent prompting to not stand up and showed no insight into safety concerns.      Electronically signed by KARIN Joya MA on 7/16/2020 at 11:57 AM

## 2020-07-16 NOTE — PROGRESS NOTES
Psychiatric Progress Note    Sebastian Jewell  [id number]  07/16/20    CHIEF COMPLAINT: \"I don't know. \"    HPI: Sebastian Jewell is an 80year old  female with PMH of dementia, depression, HTN, Hypothyroidism, CAD and myelodysplastic disease. Patient presented to [address] [place] [address] ED after becoming aggressive at home. Per ED notes patient attempted to break windows at home with her cane, threatened suicide and homicide. Psychiatry was consulted by Maeve Spence CNP for suicide and homicide attempt.     During today's interview the patient is alert and oriented to self only. She is not aware of why she is in the hospital. She denies SI/HI. Denies auditory and visual hallucinations. Denies depression or anxiety. She states she is sleeping \"OK\" but that her appetite is \"better\", improving with medication. Pt noted she currently feels safe and comfortable on the unit. Pt was in agreement with treatment team.  Has been taking medications and has been compliant with treatment. Tolerating medications without side effect complaints. Pt was polite and cordial during the interview process.       Pt continues to display severe sundowning with agitation and aggression. Pt was in agreement to take haloperidol at 1400 and 2000 for calm.     Pt noted she enjoys the unit and feels safe and comfortable       Allergies   Allergen Reactions    Ibuprofen Other (See Comments)     Unknown     Pcn [Penicillins]        Medications Prior to Admission: diphenhydrAMINE (BENADRYL) 50 MG/ML injection, Inject 25 mg into the muscle every 6 hours as needed  enoxaparin (LOVENOX) 30 MG/0.3ML injection, Inject 30 mg into the skin daily  haloperidol (HALDOL) 0.5 MG tablet, Take 0.5 mg by mouth 4 times daily  docusate sodium (COLACE, DULCOLAX) 100 MG CAPS, Take 100 mg by mouth 2 times daily  amiodarone (CORDARONE) 200 MG tablet, Take 1 tablet by mouth daily  carvedilol (COREG) 25 MG tablet, Take 12.5 mg by mouth 2 times daily (with meals)   levothyroxine (SYNTHROID) 75 MCG tablet, Take 75 mcg by mouth Daily. aspirin 81 MG tablet, Take 81 mg by mouth daily. [] cefdinir (OMNICEF) 300 MG capsule, Take 1 capsule by mouth 2 times daily for 5 days  QUEtiapine (SEROQUEL) 25 MG tablet, Take 50 mg by mouth nightly  vitamin B-12 (CYANOCOBALAMIN) 1000 MCG tablet, Take 1,000 mcg by mouth daily  losartan (COZAAR) 50 MG tablet, Take 50 mg by mouth daily  MAGNESIUM-ZINC PO, Take 1 tablet by mouth daily. Past Medical History:   Diagnosis Date    Dilated cardiomyopathy ([address]     Grade I diastolic dysfunction     Hyperlipidemia     Hypertension     Hypothyroidism     Ischemic cardiomyopathy     Mild aortic insufficiency 2019    Mild left ventricular hypertrophy 2019    Mild mitral regurgitation 2019    Myelodysplastic disease ([address]     Non-rheumatic mitral regurgitation     [facility]     Subcortical vascular dementia with behavioral disturbance ([address] 07/10/2020        Patient Active Problem List   Diagnosis    Closed displaced fracture of left femoral neck ([address]    Thyroid disease    Hypertension    CAD (coronary artery disease)    Myelodysplastic disease ([address]    Dementia, in, senility, with behavioral disturbance ([address]    Acute metabolic encephalopathy    Subcortical vascular dementia with behavioral disturbance ([address]    Hypothyroidism    Non-rheumatic mitral regurgitation    Hyperlipidemia    Grade I diastolic dysfunction    Mild left ventricular hypertrophy    Mild aortic insufficiency    Mild mitral regurgitation       Review of Systems    OBJECTIVE  Vital Signs:  Vitals:    20 0730   BP: (!) 146/69   Pulse: 77   Resp: 18   Temp: 98 °F (36.7 °C)   SpO2: 96%       Labs:  No results found for this or any previous visit (from the past 48 hour(s)).     PSYCHIATRIC ASSESSMENT / MENTAL STATUS EXAM:   Vitals: Blood pressure (!) 146/69, pulse 77, temperature 98 °F (36.7 °C), temperature source Temporal, resp. rate 18, SpO2 96 %. CONSTITUTIONAL:    Appearance: appears stated age. alert and oriented to person. no acute distress. Adequate grooming and hygeine. Good eye contact. No prominent physical abnormalities. Attitude: Manner is uncooperative yet redirectable  Motor: No psychomotor agitation, retardation or abnormal movements noted  Speech: Clearly articulated; normal rate, volume, tone & amount. Language: intact understanding and production  Mood: good  Affect: euthymic, full range, non-labile, congruent with mood and content of speech  Thought Production: Spontaneous. Thought Form:   linear, logical & goal-directed. No tangentiality or circumstantiality. No flight of ideas or loosening of associations. Thought Content/Perceptions: No SKYLER, noted AVH, noted delusion  Insight: poor  Judgment: poor  Memory: Immediate, recent, and remote appear impaired, though not formally tested. Attention: limited  Fund of knowledge: Average  Gait/Balance: WNL/WNL    IMPRESSION:   Subcortical vascular dementia with behavioral disturbance    PLAN:  Psychiatric management:medication initiation and titration, group and individual therapy, safe and theraputic environment. Status of problem/condition: Improving  Medical co-morbidities: [facility], appreciate assistance  Legal Status: Pending  Disposition:estimated LOS: Pending. The treatment team reviewed with the patient the diagnosis and treatment recommendations to include the risks, benefits, and side effects of chosen medications. The patient verbalized understanding and agreed with the treatment regimen as outlined above. The patient was encouraged to participate in groups. Medical records, Labs, Diagnotic tests reviewed  q15 min safety checks for safety  Interval History.   Review current labs  Continue current medications  Supportive Therapy Provided  Pt had an opportunity to ask questions and address concerns  Pt encouraged to continue therapy group or individual.  Pt was in agreement with treatment plan. The risks benefits and side effects of medications were discussed with the patient, including alternatives and no treatment.     Electronically signed by Christiano Knight DO on 7/16/2020 at 9:01 AM

## 2020-07-17 PROCEDURE — 97530 THERAPEUTIC ACTIVITIES: CPT

## 2020-07-17 PROCEDURE — 6370000000 HC RX 637 (ALT 250 FOR IP): Performed by: PSYCHIATRY & NEUROLOGY

## 2020-07-17 PROCEDURE — 6360000002 HC RX W HCPCS: Performed by: NURSE PRACTITIONER

## 2020-07-17 PROCEDURE — 99233 SBSQ HOSP IP/OBS HIGH 50: CPT | Performed by: NURSE PRACTITIONER

## 2020-07-17 PROCEDURE — 93005 ELECTROCARDIOGRAM TRACING: CPT | Performed by: NURSE PRACTITIONER

## 2020-07-17 PROCEDURE — 1240000000 HC EMOTIONAL WELLNESS R&B

## 2020-07-17 PROCEDURE — 6370000000 HC RX 637 (ALT 250 FOR IP): Performed by: NURSE PRACTITIONER

## 2020-07-17 RX ORDER — MIRTAZAPINE 15 MG/1
15 TABLET, FILM COATED ORAL NIGHTLY
Status: DISCONTINUED | OUTPATIENT
Start: 2020-07-17 | End: 2020-07-19 | Stop reason: HOSPADM

## 2020-07-17 RX ADMIN — HALOPERIDOL 2.5 MG: 2 SOLUTION ORAL at 13:40

## 2020-07-17 RX ADMIN — Medication 200 MG: at 08:59

## 2020-07-17 RX ADMIN — TRAZODONE HYDROCHLORIDE 50 MG: 50 TABLET ORAL at 20:30

## 2020-07-17 RX ADMIN — DOCUSATE SODIUM 100 MG: 50 LIQUID ORAL at 20:30

## 2020-07-17 RX ADMIN — HALOPERIDOL 2.5 MG: 2 SOLUTION ORAL at 20:28

## 2020-07-17 RX ADMIN — HALOPERIDOL 5 MG: 2 SOLUTION ORAL at 11:18

## 2020-07-17 RX ADMIN — LORAZEPAM 1 MG: 2 INJECTION INTRAMUSCULAR; INTRAVENOUS at 14:14

## 2020-07-17 RX ADMIN — AMIODARONE HYDROCHLORIDE 200 MG: 200 TABLET ORAL at 08:59

## 2020-07-17 RX ADMIN — CEFDINIR 300 MG: 300 CAPSULE ORAL at 08:59

## 2020-07-17 RX ADMIN — DONEPEZIL HYDROCHLORIDE 5 MG: 5 TABLET, FILM COATED ORAL at 20:30

## 2020-07-17 RX ADMIN — ASPIRIN 81 MG 81 MG: 81 TABLET ORAL at 08:59

## 2020-07-17 RX ADMIN — LOSARTAN POTASSIUM 50 MG: 50 TABLET ORAL at 08:59

## 2020-07-17 RX ADMIN — ACETAMINOPHEN ORAL SOLUTION 650 MG: 650 SOLUTION ORAL at 11:28

## 2020-07-17 RX ADMIN — QUETIAPINE FUMARATE 50 MG: 50 TABLET ORAL at 20:30

## 2020-07-17 RX ADMIN — CARVEDILOL 12.5 MG: 6.25 TABLET, FILM COATED ORAL at 08:59

## 2020-07-17 RX ADMIN — MIRTAZAPINE 15 MG: 15 TABLET, FILM COATED ORAL at 20:30

## 2020-07-17 RX ADMIN — CARVEDILOL 12.5 MG: 6.25 TABLET, FILM COATED ORAL at 17:24

## 2020-07-17 RX ADMIN — LEVOTHYROXINE SODIUM 75 MCG: 0.07 TABLET ORAL at 09:00

## 2020-07-17 RX ADMIN — CEFDINIR 300 MG: 300 CAPSULE ORAL at 20:30

## 2020-07-17 RX ADMIN — DOCUSATE SODIUM 100 MG: 50 LIQUID ORAL at 08:59

## 2020-07-17 RX ADMIN — CYANOCOBALAMIN TAB 1000 MCG 1000 MCG: 1000 TAB at 08:59

## 2020-07-17 ASSESSMENT — PAIN SCALES - GENERAL
PAINLEVEL_OUTOF10: 7
PAINLEVEL_OUTOF10: 4
PAINLEVEL_OUTOF10: 0
PAINLEVEL_OUTOF10: 0

## 2020-07-17 ASSESSMENT — PAIN SCALES - PAIN ASSESSMENT IN ADVANCED DEMENTIA (PAINAD)
NEGVOCALIZATION: 0
FACIALEXPRESSION: 0
TOTALSCORE: 0
BREATHING: 0
CONSOLABILITY: 0
BODYLANGUAGE: 0

## 2020-07-17 NOTE — GROUP NOTE
Group Therapy Note    Date: 7/17/2020    Group Start Time: 0830  Group End Time: 8446    Number of Participants: 3/4    Type: Morning Goals Group/ Community Meeting    Group Topic/Objective: Set Goal For The Day and to review Unit Rules and Regulations. Notes:  Pt working with PT/OT at time of group.      Discipline Responsible: Certified Therapeutic Recreation Specialist     Electronically signed by Susnanah Chavis [address] [place] [address] on 7/17/2020 at 9:09 AM

## 2020-07-17 NOTE — PROGRESS NOTES
Psychiatric Progress Note    Mookie Martinez  [id number]  07/17/20    CHIEF COMPLAINT: \"I don't know. \"    HPI: Mookie Martinez is an 80year old  female with PMH of dementia, depression, HTN, Hypothyroidism, CAD and myelodysplastic disease. Patient presented to [facility] ED after becoming aggressive at home. Per ED notes patient attempted to break windows at home with her cane, threatened suicide and homicide. Psychiatry was consulted by Domo Christopher CNP for suicide and homicide attempt.     During today's interview the patient is alert and oriented to self only. She is not aware of why she is in the hospital. She denies SI/HI. Denies auditory and visual hallucinations. Endorses depression and anxiety as \"a lot. \" She is unable to rate them on a numeric scale. She states she slept \"really good\" but that her appetite is \"not good. \"  Pt noted she currently feels safe and comfortable on the unit. Pt was in agreement with treatment team.  Has been taking medications and has been compliant with treatment. Tolerating medications without side effect complaints. Pt was polite and cordial during the interview process. Pt noted she enjoys the unit and feels safe and comfortable     Pt continues to display severe sundowning with agitation and aggression. Pt was in agreement to take haloperidol at 1400 and 2000 for calm. Due to physical debility and cognitive impairment Home Health Care is needed for Physical and Occupational Therapy, Psychiatric nursing, and medication management, Medical Social Worker for assistance with community resources and financial assistance as well as a [address] for assistance with ADLs. Mookie Martinez was evaluated today and a DME order was entered for a standard wheelchair because she requires this to successfully complete daily living tasks of eating, bathing, toileting, personal cares, grooming, hygiene, dressing upper body and dressing lower body.   A standard manual wheelchair Subjective:       Patient ID: Kwame Vásquez Jr. is a 89 y.o. male.    Chief Complaint: Fall (on 6/18/19 - minor bruising )    89 y old male with hx of R eye prosthesis due to fall  , glaucoma , IBS , hypothyroidism  Here for f.u . Resident at [facility] . He sustained  a fall last week after making a turn to hang his pants . Hit   r mid   Paraspinal area . + mild pain . Taking tylenol. Upset about high BP . Also has difficulties with ADL , specifically bathing . Sees Dr Marie  ( Opth) . current eye exam . This is his 3rd fall this y .     Review of Systems   Constitutional: Negative.    HENT: Negative.    Eyes: Negative.    Respiratory: Negative.    Cardiovascular: Negative.    Gastrointestinal: Negative.    Genitourinary: Negative.    Musculoskeletal: Negative.    Skin: Negative.    Hematological: Negative.        Objective:      Physical Exam   Constitutional: He is oriented to person, place, and time. He appears well-developed and well-nourished. No distress.   HENT:   Head: Normocephalic and atraumatic.   Right Ear: External ear normal.   Left Ear: External ear normal.   Nose: Nose normal.   Mouth/Throat: No oropharyngeal exudate.   Eyes: Pupils are equal, round, and reactive to light. Conjunctivae and EOM are normal. Right eye exhibits no discharge. Left eye exhibits no discharge. No scleral icterus.   Neck: Normal range of motion. Neck supple. No JVD present. No tracheal deviation present. No thyromegaly present.   Cardiovascular: Normal rate, regular rhythm, normal heart sounds and intact distal pulses. Exam reveals no gallop and no friction rub.   No murmur heard.  Pulmonary/Chest: Effort normal and breath sounds normal. No stridor. No respiratory distress. He has no wheezes. He has no rales. He exhibits tenderness.   Abdominal: Soft. Bowel sounds are normal. He exhibits no distension. There is no tenderness. There is no rebound and no guarding.   Musculoskeletal: Normal range of motion. He exhibits no  edema.        Thoracic back: He exhibits tenderness and bony tenderness.        Back:    Lymphadenopathy:     He has no cervical adenopathy.   Neurological: He is alert and oriented to person, place, and time. He has normal reflexes. He displays normal reflexes. No cranial nerve deficit. He exhibits normal muscle tone. Coordination normal.   Skin: Skin is warm and dry. No rash noted. He is not diaphoretic. No erythema. No pallor.   Psychiatric: He has a normal mood and affect. His behavior is normal. Judgment and thought content normal.       Assessment:       1. Fall, initial encounter    2. Frequent falls    3. Decreased activities of daily living (ADL)    4. Dyslipidemia    5. Disorder of cartilage     6. Abnormal finding of blood chemistry     7. Hypothyroidism, unspecified type        Plan:     Kwame was seen today for fall.    Diagnoses and all orders for this visit:    Fall, initial encounter  -     Ambulatory referral to Home Health    Frequent falls  -     Ambulatory referral to Home Health  -     Vitamin D; Future  -     X-Ray Ribs 3 Views Bilateral; Future    Decreased activities of daily living (ADL)  -     Ambulatory referral to Home Health    Dyslipidemia  -     CBC auto differential; Future  -     Comprehensive metabolic panel; Future  -     Hemoglobin A1c; Future  -     Lipid panel; Future    Disorder of cartilage   -     Vitamin D; Future    Abnormal finding of blood chemistry   -     Hemoglobin A1c; Future    Hypothyroidism, unspecified type  -     TSH; Future     GD will keep an eye on bp .   F.u in 1 m   PT / OT , Aid      is necessary due to patient's impaired ambulation and mobility restrictions and would be unable to resolve these daily living tasks using a cane or walker. The patient is capable of using a standard wheelchair safely in their home and can maneuver within their home with adequate access. There is a caregiver available to provide necessary assistance. The need for this equipment was discussed with the patient and she understands, is in agreement, and has not expressed an unwillingness to use the wheelchair. Allergies   Allergen Reactions    Ibuprofen Other (See Comments)     Unknown     Pcn [Penicillins]        Medications Prior to Admission: diphenhydrAMINE (BENADRYL) 50 MG/ML injection, Inject 25 mg into the muscle every 6 hours as needed  enoxaparin (LOVENOX) 30 MG/0.3ML injection, Inject 30 mg into the skin daily  haloperidol (HALDOL) 0.5 MG tablet, Take 0.5 mg by mouth 4 times daily  docusate sodium (COLACE, DULCOLAX) 100 MG CAPS, Take 100 mg by mouth 2 times daily  amiodarone (CORDARONE) 200 MG tablet, Take 1 tablet by mouth daily  carvedilol (COREG) 25 MG tablet, Take 12.5 mg by mouth 2 times daily (with meals)   levothyroxine (SYNTHROID) 75 MCG tablet, Take 75 mcg by mouth Daily. aspirin 81 MG tablet, Take 81 mg by mouth daily. [] cefdinir (OMNICEF) 300 MG capsule, Take 1 capsule by mouth 2 times daily for 5 days  QUEtiapine (SEROQUEL) 25 MG tablet, Take 50 mg by mouth nightly  vitamin B-12 (CYANOCOBALAMIN) 1000 MCG tablet, Take 1,000 mcg by mouth daily  losartan (COZAAR) 50 MG tablet, Take 50 mg by mouth daily  MAGNESIUM-ZINC PO, Take 1 tablet by mouth daily.     Past Medical History:   Diagnosis Date    [facility]     Grade I diastolic dysfunction     Hyperlipidemia     Hypertension     Hypothyroidism     Ischemic cardiomyopathy     Mild aortic insufficiency 2019    Mild left ventricular hypertrophy 2019    Mild mitral regurgitation 2019  [facility]     Non-rheumatic mitral regurgitation     PAF (paroxysmal atrial fibrillation) ([facility])     Subcortical vascular dementia with behavioral disturbance ([facility] 07/10/2020        Patient Active Problem List   Diagnosis    Closed displaced fracture of left femoral neck ([facility]    Thyroid disease    Hypertension    CAD (coronary artery disease)    [facility]    Dementia, in, senility, with behavioral disturbance ([facility]    Acute metabolic encephalopathy    Subcortical vascular dementia with behavioral disturbance [facility]    Hypothyroidism    Non-rheumatic mitral regurgitation    Hyperlipidemia    Grade I diastolic dysfunction    Mild left ventricular hypertrophy    Mild aortic insufficiency    Mild mitral regurgitation       Review of Systems    OBJECTIVE  Vital Signs:  Vitals:    07/17/20 0745   BP: 120/71   Pulse: 73   Resp: 16   Temp: 97.2 °F (36.2 °C)   SpO2: 100%       Labs:  No results found for this or any previous visit (from the past 48 hour(s)). PSYCHIATRIC ASSESSMENT / MENTAL STATUS EXAM:   Vitals: Blood pressure 120/71, pulse 73, temperature 97.2 °F (36.2 °C), temperature source Temporal, resp. rate 16, SpO2 100 %. CONSTITUTIONAL:    Appearance: Appears stated age. Alert and oriented to person only. No acute distress. Adequate grooming and hygiene. Poor eye contact. No prominent physical abnormalities. Attitude: Manner is cooperative  Motor: No psychomotor agitation, retardation or abnormal movements noted  Speech: Clearly articulated; normal rate, volume, tone & amount. Language: intact understanding and production  Mood: depressed  Affect: depressed, decreased range, non-labile, congruent with mood and content of speech  Thought Production: Spontaneous. Thought Form: linear, logical & goal-directed. No tangentiality or circumstantiality. No flight of ideas or loosening of associations.   Thought Content/Perceptions: No SKYLER, noted AVH, noted delusion  Insight: poor  Judgment: poor  Memory: Immediate, recent, and remote appear impaired, though not formally tested. Attention: limited  Fund of knowledge: Average  Gait/Balance: WNL/WNL    IMPRESSION:   Subcortical vascular dementia with behavioral disturbance    PLAN:  Psychiatric management:medication initiation and titration, group and individual therapy, safe and therapeutic environment. Status of problem/condition: Improving  Medical co-morbidities: Management per [facility] group, appreciate assistance  Legal Status: Pending  Disposition:estimated LOS: Pending. The treatment team reviewed with the patient the diagnosis and treatment recommendations to include the risks, benefits, and side effects of chosen medications. The patient verbalized understanding and agreed with the treatment regimen as outlined above. The patient was encouraged to participate in groups. Medical records, Labs, Diagnotic tests reviewed  q15 min safety checks for safety  Interval History. Review current labs  Continue current medications  Supportive Therapy Provided  Pt had an opportunity to ask questions and address concerns  Pt encouraged to continue therapy group or individual.  Pt was in agreement with treatment plan. The risks benefits and side effects of medications were discussed with the patient, including alternatives and no treatment.     Electronically signed by CELI Cardoza CNP on 7/17/2020 at 11:06 AM

## 2020-07-17 NOTE — PROGRESS NOTES
Physical Therapy    Physical Therapy Treatment Note  Name: Itz Zuniga MRN: [id number] :   10/2/1931   Date:  2020   Admission Date: 7/10/2020 Room:  [facility]-   Restrictions/Precautions:         Communication with other providers:  Nursing states Lucy is not moving to well this morning.   Subjective:  Patient states:  Pt states she did not sleep well  Pain:   Location, Type, Intensity (0/10 to 10/10):  Did not c/o any pain this morning  Objective:    Observation: Pt lying supine in bed  Treatment, including education/measures:  Transfer supine to sitting EOB with min/modA  Sit to stand from bed and w/c with mod A x 2, with cueing for upright posture  Standing pivot transfer from bed to chair with Mod A x 2  Assessment / Impression:       Patient's tolerance of treatment:  fair  Adverse Reaction:none  Significant change in status and impact:  none  Barriers to improvement:  Decreased cognition and fear  Plan for Next Session:    Continue with transfer and gait training as tolerated  Time in: 840  Time out:  903  Timed treatment minutes:  23  Total treatment time:  23    Previously filed items:  Social/Functional History  Lives With: Alone  Type of Home: House  Home Layout: One level  Home Access: Stairs to enter with rails  Bathroom Shower/Tub: Tub/Shower unit  Bathroom Toilet: Standard  Bathroom Equipment: Shower chair  Home Equipment: [employer] Help From: Family  ADL Assistance: Independent  Homemaking Assistance: (Pt states she has been independent)  Homemaking Responsibilities: No  Ambulation Assistance: Independent  Transfer Assistance: Independent  Active : No  Education: [employer]  Occupation: Retired  Leisure & Hobbies: [address] term goals  Time Frame for Short term goals: 7 days  Short term goal 1: S for all bed mobility  Short term goal 2: SBA for all transfers  Short term goal 3: Ambulate 150 ft with RW and CGA       Electronically signed by:    Bonny Castillo PTA  2020, 4:12 PM

## 2020-07-17 NOTE — PROGRESS NOTES
Pt very confused this morning. Wanted to get up but is back to bed now, very weak and heavy one person assist to get up and transfer. Unable to take more than a couple steps. Oriented to self only. Pt referring to a woman who is not present. Pt not cooperative with medications. Pt continued to be very confused and agitated throughout shift. Repeatedly trying to get up and walk but is too weak to do so. Pt was able to walk with a walker with assistance for short distances. Pt only ate a nutragrain bar and a few bites of cake today.   Drank less than 200 cc total.

## 2020-07-17 NOTE — BH NOTE
Lucy was visible on the unit this evening. She sat in a wheelchair and watched TV. She was very confused. She asked to go to her room several times and every time stated she did not want to be in there when staff took her to her room. She was anxious with a little irritability. Partially cooperative at times. Oriented to self at times. Medication crushed and put in a nutritional supplement drink, liquid medication in cranberry juice. She will take a sip and give it back, this writer offers it several times and uses distraction to get her to take it. It does take quite a while to do this. She was assisted to the bathroom and bed. Will continue to monitor for safety.

## 2020-07-17 NOTE — GROUP NOTE
Group Therapy Note    Date: 7/17/2020    Group Start Time: 1030  Group End Time: 2088  Group Topic: Recreational    [address] [place], [place]        Group Therapy Note    Attendees: 4/4       Notes:  Pts participated in recreational therapy group; [place] [employer] Activity. Pts were encouraged to engage in a leisure activity to promote socialization, positive mood, and coping with negative emotions. Pt attended group, but refused to participate. Pt did not response to prompting, encouragement, or redirection. Pt required Max prompting to not stand from chair. Pt's agitation noted to increase when peer kept attempting to insert herself into the situation.      Status After Intervention:  Unchanged    Participation Level: None    Participation Quality: Inappropriate, Intrusive and Resistant      Speech:  loud      Thought Process/Content: Perseverating      Affective Functioning: Congruent      Mood: irritable      Level of consciousness:  Preoccupied and Inattentive      Response to Learning: Resistant      Endings: None Reported    Modes of Intervention: Socialization and Activity      Discipline Responsible: Certified Therapeutic Recreation Specialist       Signature:  Yamil Malik MA

## 2020-07-17 NOTE — CARE COORDINATION
Patient concurrent review sent to [facility] at [phone]. LSW awaits determination.

## 2020-07-17 NOTE — PROGRESS NOTES
1:1 session completed with pt to go over her goals sheet and menu for tomorrow. Pt noted to be anxious and fixated on leaving unit. Pt not receptive of education of need to stay on the unit and the wheelchair for her safety. Pt states she is feeling \"bad\" and unable to state anything she is grateful. Pt reports restless sleep and is unsure how long she slept. At the end of the 1:1 session, pt requested to utilize the restroom and nurse informed.      Electronically signed by KARIN Medley MA on 7/17/2020 at 9:36 AM

## 2020-07-17 NOTE — PLAN OF CARE
Problem: Falls - Risk of:  Goal: Will remain free from falls  Description: Will remain free from falls  7/16/2020 2255 by Madeleine Rutledge RN  Outcome: Ongoing  7/16/2020 1107 by Kenny Jansne RN  Outcome: Ongoing  Goal: Absence of physical injury  Description: Absence of physical injury  7/16/2020 2255 by Madleeine Rutledge RN  Outcome: Ongoing  7/16/2020 1107 by Kenny Jansen RN  Outcome: Ongoing     Problem: Anger Management/Homicidal Ideation:  Goal: Able to display appropriate communication and problem solving  Description: Able to display appropriate communication and problem solving  7/16/2020 2255 by Madeleine Rutledge RN  Outcome: Ongoing  7/16/2020 1107 by Kenny Jansen RN  Outcome: Ongoing  Goal: Ability to verbalize frustrations and anger appropriately will improve  Description: Ability to verbalize frustrations and anger appropriately will improve  7/16/2020 2255 by Madeleine Rutledge RN  Outcome: Ongoing  7/16/2020 1107 by Kenny Jansen RN  Outcome: Ongoing  Goal: Absence of angry outbursts  Description: Absence of angry outbursts  7/16/2020 2255 by Madeleine Rutledge RN  Outcome: Ongoing  7/16/2020 1107 by Kenny Jansen RN  Outcome: Ongoing     Problem: Altered Mood, Depressive Behavior:  Goal: Able to verbalize and/or display a decrease in depressive symptoms  Description: Able to verbalize and/or display a decrease in depressive symptoms  7/16/2020 2255 by Madeleine Rutledge RN  Outcome: Ongoing  7/16/2020 1107 by Kenny Jansen RN  Outcome: Ongoing     Problem: Altered Mood, Deterioration in Function:  Goal: Maintenance of adequate nutrition will improve  Description: Maintenance of adequate nutrition will improve  7/16/2020 2255 by Madeleine Rutledge RN  Outcome: Ongoing  7/16/2020 1107 by Kenny Jansen RN  Outcome: Ongoing     Problem: Altered Mood, Manic Behavior:  Goal: Able to sleep  Description: Able to sleep  7/16/2020 2255 by Madeleine Rutledge RN  Outcome: Ongoing  7/16/2020 1107 by Britatny Patel

## 2020-07-17 NOTE — GROUP NOTE
Group Therapy Note    Date: 7/17/2020    Group Start Time: 2906  Group End Time: 1600  Group Topic: Healthy Living/Wellness    [address]    KARIN Byers        Group Therapy Note    Attendees: 0/3      Notes: Attempted group with pt; with RN assistance.  Pt unable to participate in exercises despite 2 person assist.       Discipline Responsible: Certified Therapeutic Recreation Specialist       Signature:  Noa Sheehan [place], [place]

## 2020-07-17 NOTE — PLAN OF CARE
[facility] Interdisciplinary Treatment Plan Note     Review Date & Time: 07/17/20  0830    Admission Type:   Admission Type:  Voluntary    Reason for admission:  Reason for Admission: Dementia with Behavioral Disturbance     Patient Diagnosis: Subcortical vascular dementia with behavioral disturbance ([facility]       PATIENT STRENGTHS:  Patient Strengths:Strengths: Positive Support  Patient Strengths and Limitations:Limitations: Unrealistic self-view, General negative or hopeless attitude about future/recovery  Addictive Behavior:Addictive Behavior  In the past 3 months, have you felt or has someone told you that you have a problem with:  : (denies at admission)  Do you have a history of Chemical Use?: No  Do you have a history of Alcohol Use?: No  Do you have a history of Street Drug Abuse?: No  Histroy of Prescripton Drug Abuse?: No  Medical Problems:   Past Medical History:   Diagnosis Date    Dilated cardiomyopathy ([facility]     Grade I diastolic dysfunction 97/67/1253    Hyperlipidemia     Hypertension     Hypothyroidism     Ischemic cardiomyopathy     Mild aortic insufficiency 05/14/2019    Mild left ventricular hypertrophy 05/14/2019    Mild mitral regurgitation 05/14/2019    Myelodysplastic disease ([facility]     Non-rheumatic mitral regurgitation     PAF (paroxysmal atrial fibrillation) ([facility]     Subcortical vascular dementia with behavioral disturbance ([facility] 07/10/2020       Risk:  Fall RiskTotal: 149  Jl Scale Jl Scale Score: 16  BVC Total: 2    Status EXAM:   Status and Exam  Normal: No  Facial Expression: Flat, Expressionless  Affect: Blunt  Level of Consciousness: Confused  Mood:Normal: No  Mood: Empty  Motor Activity:Normal: No  Motor Activity: Decreased  Interview Behavior: Cooperative(semi cooperative)  Preception: [place] to Person  Attention:Normal: No  Attention: Unable to Concentrate, Distractible  Thought Processes: Loose Assoc.   Thought Content:Normal: No  Thought Content: Poverty of Content  Hallucinations: Visual (Comment)(sees \"the boys\")  Delusions: No  Delusions: Other(See Comment)  Memory:Normal: No  Memory: Poor Recent  Insight and Judgment: No  Insight and Judgment: Poor Judgment, Poor Insight, Unrealistic  Present Suicidal Ideation: No  Present Homicidal Ideation: No    Daily Assessment Last Entry:   Daily Sleep (WDL): Within Defined Limits  Patient Currently in Pain: Denies  Daily Nutrition (WDL): Exceptions to WDL  Appetite Change: Decreased  Barriers to Nutrition: Cognitive impairment, Elderly patient  Level of Assistance: Set up    Patient Monitoring:  Frequency of Checks: 4 times per hour, close    Psychiatric Symptoms:   Depression Symptoms  Depression Symptoms: Impaired concentration, Increased irritability, Loss of interest  Anxiety Symptoms  Anxiety Symptoms: Generalized  Jacqueline Symptoms  Jacqueline Symptoms: No problems reported or observed. Psychosis Symptoms  Delusion Type: No problems reported or observed. Suicide Risk CSSR-S:  1) Within the past month, have you wished you were dead or wished you could go to sleep and not wake up? : No  2) Have you actually had any thoughts of killing yourself? : No  3) Have you been thinking about how you might kill yourself? : No  4) Have you had these thoughts and had some intention of acting on them? : No  5) Have you started to work out or worked out the details of how to kill yourself?  Do you intend to carry out this plan? : No  6) Have you ever done anything, started to do anything, or prepared to do anything to end your life?: No    EDUCATION:   Learner Progress Toward Treatment Goals: Reviewed goals and plan of care    Method: Group    Outcome: No evidence of Learning    PATIENT GOALS: Med titration, compliance with unit programming    PLAN/TREATMENT RECOMMENDATIONS UPDATE: Med titration    Estimated Length of Stay Update:  5 days   Estimated Discharge Date Update: 07/22/20  Discharge Criteria: Med titration      SHORT-TERM GOALS UPDATE:  Time frame for Short-Term Goals: 5 days     LONG-TERM GOALS UPDATE:  Time frame for Long-Term Goals: 5 days   Members Present in Team Meeting: See Dayna Collins MSW, LSW

## 2020-07-18 LAB
ANION GAP SERPL CALCULATED.3IONS-SCNC: 21 MMOL/L (ref 4–16)
BUN BLDV-MCNC: 25 MG/DL (ref 6–23)
CALCIUM SERPL-MCNC: 10.3 MG/DL (ref 8.3–10.6)
CHLORIDE BLD-SCNC: 102 MMOL/L (ref 99–110)
CO2: 19 MMOL/L (ref 21–32)
CREAT SERPL-MCNC: 0.8 MG/DL (ref 0.6–1.1)
EKG ATRIAL RATE: 66 BPM
EKG DIAGNOSIS: NORMAL
EKG P AXIS: 50 DEGREES
EKG P-R INTERVAL: 178 MS
EKG Q-T INTERVAL: 432 MS
EKG QRS DURATION: 104 MS
EKG QTC CALCULATION (BAZETT): 452 MS
EKG R AXIS: -17 DEGREES
EKG T AXIS: 47 DEGREES
EKG VENTRICULAR RATE: 66 BPM
GFR AFRICAN AMERICAN: >60 ML/MIN/1.73M2
GFR NON-AFRICAN AMERICAN: >60 ML/MIN/1.73M2
GLUCOSE BLD-MCNC: 112 MG/DL (ref 70–99)
POTASSIUM SERPL-SCNC: 3.9 MMOL/L (ref 3.5–5.1)
SODIUM BLD-SCNC: 142 MMOL/L (ref 135–145)

## 2020-07-18 PROCEDURE — 93010 ELECTROCARDIOGRAM REPORT: CPT | Performed by: INTERNAL MEDICINE

## 2020-07-18 PROCEDURE — 36415 COLL VENOUS BLD VENIPUNCTURE: CPT

## 2020-07-18 PROCEDURE — 80048 BASIC METABOLIC PNL TOTAL CA: CPT

## 2020-07-18 PROCEDURE — 6370000000 HC RX 637 (ALT 250 FOR IP): Performed by: NURSE PRACTITIONER

## 2020-07-18 PROCEDURE — 99233 SBSQ HOSP IP/OBS HIGH 50: CPT | Performed by: NURSE PRACTITIONER

## 2020-07-18 PROCEDURE — 1240000000 HC EMOTIONAL WELLNESS R&B

## 2020-07-18 PROCEDURE — 97530 THERAPEUTIC ACTIVITIES: CPT

## 2020-07-18 PROCEDURE — 6370000000 HC RX 637 (ALT 250 FOR IP): Performed by: PSYCHIATRY & NEUROLOGY

## 2020-07-18 RX ADMIN — DONEPEZIL HYDROCHLORIDE 5 MG: 5 TABLET, FILM COATED ORAL at 20:24

## 2020-07-18 RX ADMIN — QUETIAPINE FUMARATE 50 MG: 50 TABLET ORAL at 20:24

## 2020-07-18 RX ADMIN — HALOPERIDOL 2.5 MG: 2 SOLUTION ORAL at 20:24

## 2020-07-18 RX ADMIN — AMIODARONE HYDROCHLORIDE 200 MG: 200 TABLET ORAL at 09:05

## 2020-07-18 RX ADMIN — CEFDINIR 300 MG: 300 CAPSULE ORAL at 20:23

## 2020-07-18 RX ADMIN — LOSARTAN POTASSIUM 50 MG: 50 TABLET ORAL at 09:06

## 2020-07-18 RX ADMIN — ASPIRIN 81 MG 81 MG: 81 TABLET ORAL at 09:06

## 2020-07-18 RX ADMIN — HALOPERIDOL 2.5 MG: 2 SOLUTION ORAL at 13:44

## 2020-07-18 RX ADMIN — CEFDINIR 300 MG: 300 CAPSULE ORAL at 09:06

## 2020-07-18 RX ADMIN — CYANOCOBALAMIN TAB 1000 MCG 1000 MCG: 1000 TAB at 09:06

## 2020-07-18 RX ADMIN — DOCUSATE SODIUM 100 MG: 50 LIQUID ORAL at 20:24

## 2020-07-18 RX ADMIN — LEVOTHYROXINE SODIUM 75 MCG: 0.07 TABLET ORAL at 09:06

## 2020-07-18 RX ADMIN — DOCUSATE SODIUM 100 MG: 50 LIQUID ORAL at 09:07

## 2020-07-18 RX ADMIN — MIRTAZAPINE 15 MG: 15 TABLET, FILM COATED ORAL at 20:24

## 2020-07-18 RX ADMIN — Medication 200 MG: at 09:06

## 2020-07-18 RX ADMIN — CARVEDILOL 12.5 MG: 6.25 TABLET, FILM COATED ORAL at 09:06

## 2020-07-18 RX ADMIN — ACETAMINOPHEN ORAL SOLUTION 650 MG: 650 SOLUTION ORAL at 09:08

## 2020-07-18 RX ADMIN — CARVEDILOL 12.5 MG: 6.25 TABLET, FILM COATED ORAL at 18:49

## 2020-07-18 ASSESSMENT — PAIN SCALES - GENERAL
PAINLEVEL_OUTOF10: 7
PAINLEVEL_OUTOF10: 0
PAINLEVEL_OUTOF10: 0
PAINLEVEL_OUTOF10: 6

## 2020-07-18 NOTE — CARE COORDINATION
Received determination from [facility]. Patient approved with NRD 7/22/2020.  Formerly [facility], [facility]#[id number]

## 2020-07-18 NOTE — PROGRESS NOTES
Physical Therapy    Physical Therapy Treatment Note  Name: Caitlyn Vance MRN: [id number] :   10/2/1931   Date:  2020   Admission Date: 7/10/2020 Room:  [address] [place] [address]   Restrictions/Precautions:         Communication with other providers:  Nursing states Lucy is not moving to well this morning.   Subjective:  Patient states:  Pt states she wanted to get up/out of chair  Pain:   Location, Type, Intensity (0/10 to 10/10):  Did not c/o any pain this morning  Objective:    Observation: Pt lying supine in bed  Treatment, including education/measures:  Sit to stand from w/c with mod A x 6, with cueing for upright posture  Repeated efforts to stand and walk- patient would stand briefly, however would not take steps today  Patient performed LAQ and marching exercise x 15 reps ea R/L in chair  Assessment / Impression:       Patient's tolerance of treatment:  Fair- became restless/appeared frustrated so PT session was ended  Adverse Reaction:none  Significant change in status and impact:  none  Barriers to improvement:  Decreased cognition and fear  Plan for Next Session:    Continue with transfer and gait training as tolerated  Time in: 955  Time out: 1015  Timed treatment minutes: 20  Total treatment time:  20    Previously filed items:  Social/Functional History  Lives With: Alone  Type of Home: [address] [place] [address],[address]: One level  Home Access: Stairs to enter with rails  Bathroom Shower/Tub: Tub/Shower unit  Bathroom Toilet: Standard  Bathroom Equipment: Shower chair  Home Equipment: [employer] From: Family  ADL Assistance: [address]: (Pt states she has been independent)  Homemaking Responsibilities: No  Ambulation Assistance: Independent  Transfer Assistance: Independent  Active : No  Education: [employer]  Occupation: Retired  Leisure & Hobbies: Cooking  Short term goals  Time Frame for Short term goals: 7 days  Short term goal 1: S for all bed mobility  Short term goal 2: SBA for all transfers  Short term goal 3: Ambulate 150 ft with RW and CGA       Electronically signed by:    Nicolas Tejeda, PT   7/18/2020, 11:21 AM

## 2020-07-18 NOTE — GROUP NOTE
Group Therapy Note    Date: 7/18/2020    Group Start Time: 3118  Group End Time: 1200  Group Topic: Psychoeducation    Mike Mckay, MSWELLIEW    Group Therapy Note    Attendees: 3/4       Patient's Goal: \"to feel better\"    Notes: Patient participated in group. Open discussion with peers about music and mental health. Patient did not participate in the discussion but did listen to the music played in group.      Status After Intervention:  Unchanged    Participation Level: Minimal    Participation Quality: Appropriate    Speech:  normal    Thought Process/Content: confusion present    Affective Functioning: Flat    Mood: anxious    Level of consciousness:  Alert    Response to Learning: No evidence of learning    Endings: None Reported    Modes of Intervention: Education, Socialization and Activity    Discipline Responsible: /Counselor    Signature: STEFAN Latham, GUANAKO

## 2020-07-18 NOTE — PROGRESS NOTES
Pt resting in bed at start of shift. Pt compliant with medications crushed in pudding with some encouragement needed. Pt confused, alert to self and irritable. Pt denies thoughts to harm self or others and is not observed to be responding to internal stimuli. Pt observed moving self in bed fidgeting with blankets bed alarm sounding. Pt offered to use BR but declined, assisted to a position of comfort in bed and offered fluids po.

## 2020-07-18 NOTE — PROGRESS NOTES
Hospitalist Progress Note       Arslan Garcia M.D.  7/18/2020 2:48 PM  Admit Date: 7/10/2020    PCP: Farshad Becerril MD     Assessment and Plan:   1. Dementia with behavioral disturbances managed by primary team  2. Hypertension continue on Coreg  3. Hypothyroidism continue levothyroxine  4. Coronary disease continue on aspirin and beta-blocker  5. 5. 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 F/U:  Interval History: repeats I want to go    Objective:   No intake or output data in the 24 hours ending 07/18/20 1448   Vitals:   Vitals:    07/18/20 0745   BP: 118/60   Pulse: 73   Resp: 16   Temp: 97.6 °F (36.4 °C)   SpO2: 99%     Physical Exam:  Gen:  awake, alert, , EYES:  Lids and lashes normal, pupils equal, round and reactive to light, extra ocular muscles intact, sclera clear, conjunctiva normal  ENT:  Normocephalic, oral pharynx with moist mucus membranes, tonsils without erythema or exudates,  NECK:  Supple, symmetrical, trachea midline, no adenopathy,  LUNGS:  Clear to auscultate bilaterally, no rales ronchi or wheezing noted. CARDIOVASCULAR:  regular rate and rhythm, normal S1 and S2, no S3 or S4, and no murmur noted  ABDOMEN: Normal BS, Non tender, non distended, no HSM noted.   MUSCULOSKELETAL:  ROM of all extremities grossly wnl  NEUROLOGIC: doesn't follow direction  SKIN:  no bruising or bleeding, normal skin color, texture, turgor and no redness, warmth, or swelling    Ct Head Wo Contrast    Result Date: 7/8/2020  EXAMINATION: CT OF THE HEAD WITHOUT CONTRAST  7/8/2020 5:37 pm TECHNIQUE: CT of the head was performed without the administration of intravenous contrast. Dose modulation, iterative reconstruction, and/or weight based adjustment of the mA/kV was utilized to reduce the radiation dose to as low as reasonably achievable. COMPARISON: None. HISTORY: ORDERING SYSTEM PROVIDED HISTORY: ams TECHNOLOGIST PROVIDED HISTORY: Reason for exam:->ams Has a \"code stroke\" or \"stroke alert\" been called? ->No Reason for Exam: ams Acuity: Unknown Type of Exam: Unknown FINDINGS: BRAIN/VENTRICLES: There is generalized atrophy and there is moderate patchy periventricular and subcortical white matter low attenuation that is nonspecific but most consistent with chronic small vessel ischemia. There is no evidence of acute hemorrhage, mass or extra-axial fluid collection. Calcified plaque in the intracranial internal carotid arteries. ORBITS: The visualized portion of the orbits demonstrate no acute abnormality. SINUSES: The visualized paranasal sinuses and mastoid air cells demonstrate no acute abnormality. SOFT TISSUES/SKULL:  No acute abnormality of the visualized skull or soft tissues. No acute intracranial abnormality. Generalized atrophy and chronic small vessel ischemic white matter disease.   -    DATA:    CBC No results for input(s): WBC, HGB, HCT, PLT in the last 72 hours. BMP No results for input(s): NA, K, CL, CO2, PHOS, BUN, CREATININE in the last 72 hours. Invalid input(s): CA  LFT'S No results for input(s): AST, ALT, ALB, BILIDIR, BILITOT, ALKPHOS in the last 72 hours. COAG No results for input(s): INR in the last 72 hours. CARDIAC ENZYMES  No results for input(s): CKTOTAL, CKMB, CKMBINDEX, TROPONINI in the last 72 hours.   U/A:    Lab Results   Component Value Date    COLORU YELLOW 07/08/2020    WBCUA 27 07/08/2020 RBCUA NONE SEEN 07/08/2020    MUCUS RARE 01/25/2016    BACTERIA NEGATIVE 07/08/2020    CLARITYU SLIGHTLY CLOUDY 07/08/2020    SPECGRAV 1.019 07/08/2020    LEUKOCYTESUR LARGE 07/08/2020    BLOODU NEGATIVE 07/08/2020    AMORPHOUS RARE 07/08/2020         Shayla Wood MD  Rounding Hospitalist

## 2020-07-18 NOTE — PROGRESS NOTES
RN discussed with hospitalist- pt appears dehydrated. Pt with poor skin turgor and has had increasing weakness and poor intake. New orders for a UA and BMP, and RN to encourage PO intake. Pt difficult to get to take medications.   Crushed pills In pudding

## 2020-07-18 NOTE — PLAN OF CARE
Problem: Falls - Risk of:  Goal: Will remain free from falls  Description: Will remain free from falls  Outcome: Ongoing  Goal: Absence of physical injury  Description: Absence of physical injury  Outcome: Ongoing     Problem: Anger Management/Homicidal Ideation:  Goal: Able to display appropriate communication and problem solving  Description: Able to display appropriate communication and problem solving  Outcome: Ongoing  Goal: Ability to verbalize frustrations and anger appropriately will improve  Description: Ability to verbalize frustrations and anger appropriately will improve  Outcome: Ongoing  Goal: Absence of angry outbursts  Description: Absence of angry outbursts  Outcome: Ongoing     Problem: Altered Mood, Depressive Behavior:  Goal: Able to verbalize and/or display a decrease in depressive symptoms  Description: Able to verbalize and/or display a decrease in depressive symptoms  Outcome: Ongoing     Problem: Altered Mood, Deterioration in Function:  Goal: Maintenance of adequate nutrition will improve  Description: Maintenance of adequate nutrition will improve  Outcome: Ongoing     Problem: Altered Mood, Manic Behavior:  Goal: Able to sleep  Description: Able to sleep  Outcome: Ongoing  Goal: Mood stable  Description: Mood stable  Outcome: Ongoing     Problem: Skin Integrity:  Goal: Will show no infection signs and symptoms  Description: Will show no infection signs and symptoms  Outcome: Ongoing  Goal: Absence of new skin breakdown  Description: Absence of new skin breakdown  Outcome: Ongoing     Problem: Pain:  Goal: Pain level will decrease  Description: Pain level will decrease  Outcome: Ongoing  Goal: Control of acute pain  Description: Control of acute pain  Outcome: Ongoing  Goal: Control of chronic pain  Description: Control of chronic pain  Outcome: Ongoing

## 2020-07-18 NOTE — PROGRESS NOTES
Psychiatric Progress Note    Christophe Campoverde  [id number]  07/18/20    CHIEF COMPLAINT: \"I don't know. \"    HPI: Christophe Campoverde is an 80year old  female with PMH of dementia, depression, HTN, Hypothyroidism, CAD and myelodysplastic disease. Patient presented to [facility] ED after becoming aggressive at home. Per ED notes patient attempted to break windows at home with her cane, threatened suicide and homicide. Psychiatry was consulted by Ketty Carrillo CNP for suicide and homicide attempt.     Met with patient in the common room. She  is alert and oriented to self only. She is not aware of why she is in the hospital. She denies SI/HI. Denies auditory and visual hallucinations. Endorses depression and anxiety as \"a lot. \" She is unable to rate them on a numeric scale. She states she slept [employer]" but  Per staff slept 5 hours that were fractured. Her appetite improved today but is still limited. Pt noted she currently feels safe and comfortable on the unit. Pt was in agreement with treatment team.  Has been taking medications and has been compliant with treatment. Tolerating medications without side effect complaints. Pt was polite and cordial during the interview process. Pt noted she enjoys the unit and feels safe and comfortable     Pt continues to display severe sundowning with agitation and aggression. Pt was in agreement to take haloperidol at 1400 and 2000 for calm. Due to physical debility and cognitive impairment Home Health Care is needed for Physical and Occupational Therapy, Psychiatric nursing, and medication management, Medical Social Worker for assistance with community resources and financial assistance as well as a [address] for assistance with ADLs.     Christophe Campoverde was evaluated today and a DME order was entered for a standard wheelchair because she requires this to successfully complete daily living tasks of eating, bathing, toileting, personal cares, grooming, hygiene, dressing upper body and dressing lower body. A standard manual wheelchair is necessary due to patient's impaired ambulation and mobility restrictions and would be unable to resolve these daily living tasks using a cane or walker. The patient is capable of using a standard wheelchair safely in their home and can maneuver within their home with adequate access. There is a caregiver available to provide necessary assistance. The need for this equipment was discussed with the patient and she understands, is in agreement, and has not expressed an unwillingness to use the wheelchair. Allergies   Allergen Reactions    Ibuprofen Other (See Comments)     Unknown     Pcn [Penicillins]        Medications Prior to Admission: diphenhydrAMINE (BENADRYL) 50 MG/ML injection, Inject 25 mg into the muscle every 6 hours as needed  enoxaparin (LOVENOX) 30 MG/0.3ML injection, Inject 30 mg into the skin daily  haloperidol (HALDOL) 0.5 MG tablet, Take 0.5 mg by mouth 4 times daily  docusate sodium (COLACE, DULCOLAX) 100 MG CAPS, Take 100 mg by mouth 2 times daily  amiodarone (CORDARONE) 200 MG tablet, Take 1 tablet by mouth daily  carvedilol (COREG) 25 MG tablet, Take 12.5 mg by mouth 2 times daily (with meals)   levothyroxine (SYNTHROID) 75 MCG tablet, Take 75 mcg by mouth Daily. aspirin 81 MG tablet, Take 81 mg by mouth daily. [] cefdinir (OMNICEF) 300 MG capsule, Take 1 capsule by mouth 2 times daily for 5 days  QUEtiapine (SEROQUEL) 25 MG tablet, Take 50 mg by mouth nightly  vitamin B-12 (CYANOCOBALAMIN) 1000 MCG tablet, Take 1,000 mcg by mouth daily  losartan (COZAAR) 50 MG tablet, Take 50 mg by mouth daily  MAGNESIUM-ZINC PO, Take 1 tablet by mouth daily.     Past Medical History:   Diagnosis Date    Dilated cardiomyopathy ([facility]     Grade I diastolic dysfunction     Hyperlipidemia     Hypertension     Hypothyroidism     Ischemic cardiomyopathy     Mild aortic insufficiency 2019    Mild left physical abnormalities. Attitude: Manner is cooperative  Motor: No psychomotor agitation, retardation or abnormal movements noted  Speech: Clearly articulated; normal rate, volume, tone & amount. Language: intact understanding and production  Mood: depressed  Affect: depressed, decreased range, non-labile, congruent with mood and content of speech  Thought Production: Spontaneous. Thought Form: linear, logical & goal-directed. No tangentiality or circumstantiality. No flight of ideas or loosening of associations. Thought Content/Perceptions: No SKYLER, noted AVH, noted delusion  Insight: poor  Judgment: poor  Memory: Immediate, recent, and remote appear impaired, though not formally tested. Attention: limited  Fund of knowledge: Average  Gait/Balance: WNL/WNL    IMPRESSION:   Subcortical vascular dementia with behavioral disturbance    PLAN:  Psychiatric management:medication initiation and titration, group and individual therapy, safe and therapeutic environment. Status of problem/condition: Improving  Medical co-morbidities: Management per [facility] group, appreciate assistance  Legal Status: Pending  Disposition:estimated LOS: Pending. The treatment team reviewed with the patient the diagnosis and treatment recommendations to include the risks, benefits, and side effects of chosen medications. The patient verbalized understanding and agreed with the treatment regimen as outlined above. The patient was encouraged to participate in groups. Medical records, Labs, Diagnotic tests reviewed  q15 min safety checks for safety  Interval History. Review current labs  Continue current medications  Supportive Therapy Provided  Pt had an opportunity to ask questions and address concerns  Pt encouraged to continue therapy group or individual.  Pt was in agreement with treatment plan.   The risks benefits and side effects of medications were discussed with the patient, including alternatives and no treatment.     Electronically signed by CELI Palomino CNP on 7/18/2020 at 10:06 AM

## 2020-07-18 NOTE — PLAN OF CARE
Problem: Falls - Risk of:  Goal: Will remain free from falls  Description: Will remain free from falls  7/17/2020 2117 by Saul Crespo RN  Outcome: Ongoing  7/17/2020 1810 by Corbin Schilder, RN  Outcome: Ongoing  Goal: Absence of physical injury  Description: Absence of physical injury  7/17/2020 2117 by Saul Crespo RN  Outcome: Ongoing  7/17/2020 1810 by Corbin Schilder, RN  Outcome: Ongoing     Problem: Anger Management/Homicidal Ideation:  Goal: Able to display appropriate communication and problem solving  Description: Able to display appropriate communication and problem solving  7/17/2020 2117 by Saul Crespo RN  Outcome: Ongoing  7/17/2020 1810 by Corbin Schilder, RN  Outcome: Ongoing  Goal: Ability to verbalize frustrations and anger appropriately will improve  Description: Ability to verbalize frustrations and anger appropriately will improve  7/17/2020 2117 by Saul Crespo RN  Outcome: Ongoing  7/17/2020 1810 by Corbin Schilder, RN  Outcome: Ongoing  Goal: Absence of angry outbursts  Description: Absence of angry outbursts  7/17/2020 2117 by Saul Crespo RN  Outcome: Ongoing  7/17/2020 1810 by Corbin Schilder, RN  Outcome: Ongoing     Problem: Altered Mood, Depressive Behavior:  Goal: Able to verbalize and/or display a decrease in depressive symptoms  Description: Able to verbalize and/or display a decrease in depressive symptoms  7/17/2020 2117 by Saul Crespo RN  Outcome: Ongoing  7/17/2020 1810 by Corbin Schilder, RN  Outcome: Ongoing     Problem: Altered Mood, Deterioration in Function:  Goal: Maintenance of adequate nutrition will improve  Description: Maintenance of adequate nutrition will improve  7/17/2020 2117 by Saul Crespo RN  Outcome: Ongoing  7/17/2020 1810 by Corbin Schilder, RN  Outcome: Ongoing     Problem: Altered Mood, Manic Behavior:  Goal: Able to sleep  Description: Able to sleep  7/17/2020 2117 by Saul Crespo RN  Outcome: Ongoing  7/17/2020 1810 by Rina Morris RN  Outcome: Ongoing  Goal: Mood stable  Description: Mood stable  7/17/2020 2117 by Gera Crandall RN  Outcome: Ongoing  7/17/2020 1810 by Rina Morris RN  Outcome: Ongoing     Problem: Skin Integrity:  Goal: Will show no infection signs and symptoms  Description: Will show no infection signs and symptoms  7/17/2020 2117 by Gera Crandall RN  Outcome: Ongoing  7/17/2020 1810 by Rina Morris RN  Outcome: Ongoing  Goal: Absence of new skin breakdown  Description: Absence of new skin breakdown  7/17/2020 2117 by Gera Crandall RN  Outcome: Ongoing  7/17/2020 1810 by Rina Morris RN  Outcome: Ongoing     Problem: Pain:  Description: Pain management should include both nonpharmacologic and pharmacologic interventions.   Goal: Pain level will decrease  Description: Pain level will decrease  7/17/2020 2117 by Gera Crandall RN  Outcome: Ongoing  7/17/2020 1810 by Rina Morris RN  Outcome: Ongoing  Goal: Control of acute pain  Description: Control of acute pain  7/17/2020 2117 by Gera Crandall RN  Outcome: Ongoing  7/17/2020 1810 by Rina Morris RN  Outcome: Ongoing  Goal: Control of chronic pain  Description: Control of chronic pain  7/17/2020 2117 by Gera Crandall RN  Outcome: Ongoing  7/17/2020 1810 by Rina Morris RN  Outcome: Ongoing

## 2020-07-19 ENCOUNTER — HOSPITAL ENCOUNTER (OUTPATIENT)
Age: 85
Setting detail: OBSERVATION
Discharge: ANOTHER HOSPITAL | End: 2020-07-20
Attending: INTERNAL MEDICINE | Admitting: INTERNAL MEDICINE
Payer: COMMERCIAL

## 2020-07-19 VITALS
HEART RATE: 86 BPM | WEIGHT: 120 LBS | HEIGHT: 60 IN | SYSTOLIC BLOOD PRESSURE: 134 MMHG | BODY MASS INDEX: 23.56 KG/M2 | RESPIRATION RATE: 16 BRPM | DIASTOLIC BLOOD PRESSURE: 78 MMHG | TEMPERATURE: 97.8 F | OXYGEN SATURATION: 96 %

## 2020-07-19 PROBLEM — R00.0 TACHYCARDIA: Status: ACTIVE | Noted: 2020-07-19

## 2020-07-19 LAB
BACTERIA: ABNORMAL /HPF
BILIRUBIN URINE: NEGATIVE MG/DL
BLOOD, URINE: ABNORMAL
CAST TYPE: NEGATIVE /HPF
CLARITY: ABNORMAL
COLOR: YELLOW
CRYSTAL TYPE: NEGATIVE /HPF
EKG ATRIAL RATE: 83 BPM
EKG DIAGNOSIS: NORMAL
EKG P AXIS: 60 DEGREES
EKG P-R INTERVAL: 186 MS
EKG Q-T INTERVAL: 410 MS
EKG QRS DURATION: 100 MS
EKG QTC CALCULATION (BAZETT): 481 MS
EKG R AXIS: 2 DEGREES
EKG T AXIS: 100 DEGREES
EKG VENTRICULAR RATE: 83 BPM
EPITHELIAL CELLS, UA: ABNORMAL /HPF
GLUCOSE, URINE: NEGATIVE MG/DL
KETONES, URINE: 15 MG/DL
LEUKOCYTE ESTERASE, URINE: ABNORMAL
NITRITE URINE, QUANTITATIVE: NEGATIVE
PH, URINE: 5.5 (ref 5–8)
PROTEIN UA: 30 MG/DL
RBC URINE: ABNORMAL /HPF (ref 0–6)
SPECIFIC GRAVITY UA: 1.03 (ref 1–1.03)
UROBILINOGEN, URINE: 0.2 MG/DL (ref 0.2–1)
WBC UA: ABNORMAL /HPF (ref 0–5)

## 2020-07-19 PROCEDURE — 93010 ELECTROCARDIOGRAM REPORT: CPT | Performed by: INTERNAL MEDICINE

## 2020-07-19 PROCEDURE — G0379 DIRECT REFER HOSPITAL OBSERV: HCPCS

## 2020-07-19 PROCEDURE — G0378 HOSPITAL OBSERVATION PER HR: HCPCS

## 2020-07-19 PROCEDURE — 87086 URINE CULTURE/COLONY COUNT: CPT

## 2020-07-19 PROCEDURE — 6370000000 HC RX 637 (ALT 250 FOR IP): Performed by: NURSE PRACTITIONER

## 2020-07-19 PROCEDURE — 6370000000 HC RX 637 (ALT 250 FOR IP): Performed by: INTERNAL MEDICINE

## 2020-07-19 PROCEDURE — 93005 ELECTROCARDIOGRAM TRACING: CPT | Performed by: PSYCHIATRY & NEUROLOGY

## 2020-07-19 PROCEDURE — 81001 URINALYSIS AUTO W/SCOPE: CPT

## 2020-07-19 PROCEDURE — 99239 HOSP IP/OBS DSCHRG MGMT >30: CPT | Performed by: NURSE PRACTITIONER

## 2020-07-19 PROCEDURE — 93005 ELECTROCARDIOGRAM TRACING: CPT | Performed by: INTERNAL MEDICINE

## 2020-07-19 RX ORDER — LOSARTAN POTASSIUM 50 MG/1
50 TABLET ORAL DAILY
Status: CANCELLED | OUTPATIENT
Start: 2020-07-20

## 2020-07-19 RX ORDER — CEFDINIR 300 MG/1
300 CAPSULE ORAL 2 TIMES DAILY
Status: CANCELLED | OUTPATIENT
Start: 2020-07-19

## 2020-07-19 RX ORDER — QUETIAPINE FUMARATE 25 MG/1
50 TABLET, FILM COATED ORAL NIGHTLY
Status: DISCONTINUED | OUTPATIENT
Start: 2020-07-19 | End: 2020-07-20 | Stop reason: HOSPADM

## 2020-07-19 RX ORDER — QUETIAPINE FUMARATE 50 MG/1
50 TABLET, FILM COATED ORAL NIGHTLY
Status: CANCELLED | OUTPATIENT
Start: 2020-07-19

## 2020-07-19 RX ORDER — AMIODARONE HYDROCHLORIDE 200 MG/1
200 TABLET ORAL DAILY
Status: CANCELLED | OUTPATIENT
Start: 2020-07-20

## 2020-07-19 RX ORDER — TRAZODONE HYDROCHLORIDE 50 MG/1
50 TABLET ORAL NIGHTLY PRN
Status: CANCELLED | OUTPATIENT
Start: 2020-07-19

## 2020-07-19 RX ORDER — ACETAMINOPHEN 325 MG/1
650 TABLET ORAL EVERY 4 HOURS PRN
Status: DISCONTINUED | OUTPATIENT
Start: 2020-07-19 | End: 2020-07-20 | Stop reason: HOSPADM

## 2020-07-19 RX ORDER — TRAZODONE HYDROCHLORIDE 50 MG/1
50 TABLET ORAL NIGHTLY PRN
Status: DISCONTINUED | OUTPATIENT
Start: 2020-07-19 | End: 2020-07-20 | Stop reason: HOSPADM

## 2020-07-19 RX ORDER — LEVOTHYROXINE SODIUM 0.07 MG/1
75 TABLET ORAL DAILY
Status: CANCELLED | OUTPATIENT
Start: 2020-07-20

## 2020-07-19 RX ORDER — LEVOTHYROXINE SODIUM 0.07 MG/1
75 TABLET ORAL DAILY
Status: DISCONTINUED | OUTPATIENT
Start: 2020-07-20 | End: 2020-07-20 | Stop reason: HOSPADM

## 2020-07-19 RX ORDER — CARVEDILOL 6.25 MG/1
12.5 TABLET ORAL 2 TIMES DAILY WITH MEALS
Status: CANCELLED | OUTPATIENT
Start: 2020-07-19

## 2020-07-19 RX ORDER — CEFDINIR 300 MG/1
300 CAPSULE ORAL 2 TIMES DAILY
Status: DISCONTINUED | OUTPATIENT
Start: 2020-07-19 | End: 2020-07-19

## 2020-07-19 RX ORDER — LANOLIN ALCOHOL/MO/W.PET/CERES
200 CREAM (GRAM) TOPICAL DAILY
Status: CANCELLED | OUTPATIENT
Start: 2020-07-20

## 2020-07-19 RX ORDER — ASPIRIN 81 MG/1
81 TABLET, CHEWABLE ORAL DAILY
Status: DISCONTINUED | OUTPATIENT
Start: 2020-07-20 | End: 2020-07-20 | Stop reason: HOSPADM

## 2020-07-19 RX ORDER — DONEPEZIL HYDROCHLORIDE 5 MG/1
5 TABLET, FILM COATED ORAL NIGHTLY
Status: DISCONTINUED | OUTPATIENT
Start: 2020-07-19 | End: 2020-07-20 | Stop reason: HOSPADM

## 2020-07-19 RX ORDER — LOSARTAN POTASSIUM 50 MG/1
50 TABLET ORAL DAILY
Status: DISCONTINUED | OUTPATIENT
Start: 2020-07-20 | End: 2020-07-20 | Stop reason: HOSPADM

## 2020-07-19 RX ORDER — POLYETHYLENE GLYCOL 3350 17 G/17G
17 POWDER, FOR SOLUTION ORAL DAILY PRN
Status: CANCELLED | OUTPATIENT
Start: 2020-07-19

## 2020-07-19 RX ORDER — MIRTAZAPINE 15 MG/1
15 TABLET, FILM COATED ORAL NIGHTLY
Status: CANCELLED | OUTPATIENT
Start: 2020-07-19

## 2020-07-19 RX ORDER — CEFDINIR 300 MG/1
300 CAPSULE ORAL EVERY 12 HOURS SCHEDULED
Status: DISCONTINUED | OUTPATIENT
Start: 2020-07-19 | End: 2020-07-20 | Stop reason: HOSPADM

## 2020-07-19 RX ORDER — LANOLIN ALCOHOL/MO/W.PET/CERES
1000 CREAM (GRAM) TOPICAL DAILY
Status: CANCELLED | OUTPATIENT
Start: 2020-07-20

## 2020-07-19 RX ORDER — LANOLIN ALCOHOL/MO/W.PET/CERES
200 CREAM (GRAM) TOPICAL DAILY
Status: DISCONTINUED | OUTPATIENT
Start: 2020-07-20 | End: 2020-07-20 | Stop reason: HOSPADM

## 2020-07-19 RX ORDER — POLYETHYLENE GLYCOL 3350 17 G/17G
17 POWDER, FOR SOLUTION ORAL DAILY PRN
Status: DISCONTINUED | OUTPATIENT
Start: 2020-07-19 | End: 2020-07-20 | Stop reason: HOSPADM

## 2020-07-19 RX ORDER — MIRTAZAPINE 15 MG/1
15 TABLET, FILM COATED ORAL NIGHTLY
Status: DISCONTINUED | OUTPATIENT
Start: 2020-07-19 | End: 2020-07-20 | Stop reason: HOSPADM

## 2020-07-19 RX ORDER — AMIODARONE HYDROCHLORIDE 200 MG/1
200 TABLET ORAL DAILY
Status: DISCONTINUED | OUTPATIENT
Start: 2020-07-20 | End: 2020-07-20 | Stop reason: HOSPADM

## 2020-07-19 RX ORDER — DONEPEZIL HYDROCHLORIDE 5 MG/1
5 TABLET, FILM COATED ORAL NIGHTLY
Status: CANCELLED | OUTPATIENT
Start: 2020-07-19

## 2020-07-19 RX ORDER — LANOLIN ALCOHOL/MO/W.PET/CERES
1000 CREAM (GRAM) TOPICAL DAILY
Status: DISCONTINUED | OUTPATIENT
Start: 2020-07-20 | End: 2020-07-20 | Stop reason: HOSPADM

## 2020-07-19 RX ORDER — ASPIRIN 81 MG/1
81 TABLET, CHEWABLE ORAL DAILY
Status: CANCELLED | OUTPATIENT
Start: 2020-07-20

## 2020-07-19 RX ORDER — CARVEDILOL 12.5 MG/1
12.5 TABLET ORAL 2 TIMES DAILY WITH MEALS
Status: DISCONTINUED | OUTPATIENT
Start: 2020-07-19 | End: 2020-07-20 | Stop reason: HOSPADM

## 2020-07-19 RX ADMIN — ASPIRIN 81 MG 81 MG: 81 TABLET ORAL at 09:47

## 2020-07-19 RX ADMIN — DOCUSATE SODIUM 100 MG: 50 LIQUID ORAL at 22:27

## 2020-07-19 RX ADMIN — CEFDINIR 300 MG: 300 CAPSULE ORAL at 09:47

## 2020-07-19 RX ADMIN — DONEPEZIL HYDROCHLORIDE 5 MG: 5 TABLET, FILM COATED ORAL at 22:22

## 2020-07-19 RX ADMIN — CEFDINIR 300 MG: 300 CAPSULE ORAL at 22:22

## 2020-07-19 RX ADMIN — QUETIAPINE FUMARATE 50 MG: 25 TABLET ORAL at 22:22

## 2020-07-19 RX ADMIN — CARVEDILOL 12.5 MG: 12.5 TABLET, FILM COATED ORAL at 15:52

## 2020-07-19 RX ADMIN — ACETAMINOPHEN 650 MG: 325 TABLET ORAL at 15:52

## 2020-07-19 RX ADMIN — CARVEDILOL 12.5 MG: 6.25 TABLET, FILM COATED ORAL at 09:43

## 2020-07-19 RX ADMIN — TRAZODONE HYDROCHLORIDE 50 MG: 50 TABLET ORAL at 22:22

## 2020-07-19 RX ADMIN — MIRTAZAPINE 15 MG: 15 TABLET, FILM COATED ORAL at 22:22

## 2020-07-19 RX ADMIN — CYANOCOBALAMIN TAB 1000 MCG 1000 MCG: 1000 TAB at 09:47

## 2020-07-19 RX ADMIN — Medication 200 MG: at 09:46

## 2020-07-19 RX ADMIN — LEVOTHYROXINE SODIUM 75 MCG: 0.07 TABLET ORAL at 09:47

## 2020-07-19 RX ADMIN — AMIODARONE HYDROCHLORIDE 200 MG: 200 TABLET ORAL at 09:43

## 2020-07-19 ASSESSMENT — PAIN DESCRIPTION - PROGRESSION
CLINICAL_PROGRESSION: NOT CHANGED

## 2020-07-19 ASSESSMENT — PAIN SCALES - PAIN ASSESSMENT IN ADVANCED DEMENTIA (PAINAD)
NEGVOCALIZATION: 0
TOTALSCORE: 0
TOTALSCORE: 0
BREATHING: 0
TOTALSCORE: 0
CONSOLABILITY: 0
BREATHING: 0
FACIALEXPRESSION: 0
FACIALEXPRESSION: 0
BREATHING: 0
CONSOLABILITY: 0
NEGVOCALIZATION: 0
TOTALSCORE: 0
TOTALSCORE: 0
BODYLANGUAGE: 0
BODYLANGUAGE: 0
NEGVOCALIZATION: 0
BODYLANGUAGE: 0
FACIALEXPRESSION: 0
BODYLANGUAGE: 0
NEGVOCALIZATION: 0
BODYLANGUAGE: 0
TOTALSCORE: 0
NEGVOCALIZATION: 0
TOTALSCORE: 0
BREATHING: 0
CONSOLABILITY: 0
BREATHING: 0
NEGVOCALIZATION: 0
NEGVOCALIZATION: 0
BREATHING: 0
CONSOLABILITY: 0
CONSOLABILITY: 0
FACIALEXPRESSION: 0
BREATHING: 0
BODYLANGUAGE: 0
BODYLANGUAGE: 0
BREATHING: 0
FACIALEXPRESSION: 0
BODYLANGUAGE: 0
TOTALSCORE: 0
BODYLANGUAGE: 0
BODYLANGUAGE: 0
FACIALEXPRESSION: 0
FACIALEXPRESSION: 0
CONSOLABILITY: 0
BODYLANGUAGE: 0
FACIALEXPRESSION: 0
CONSOLABILITY: 0
FACIALEXPRESSION: 0
BREATHING: 0
NEGVOCALIZATION: 0
NEGVOCALIZATION: 0
CONSOLABILITY: 0
FACIALEXPRESSION: 0
BODYLANGUAGE: 0
BREATHING: 0
TOTALSCORE: 0
BODYLANGUAGE: 0
TOTALSCORE: 0
BODYLANGUAGE: 0
FACIALEXPRESSION: 0
FACIALEXPRESSION: 0
TOTALSCORE: 0
CONSOLABILITY: 0
NEGVOCALIZATION: 0
BREATHING: 0
NEGVOCALIZATION: 0
NEGVOCALIZATION: 0
BREATHING: 0
FACIALEXPRESSION: 0
CONSOLABILITY: 0
TOTALSCORE: 0
FACIALEXPRESSION: 0
CONSOLABILITY: 0
NEGVOCALIZATION: 0
TOTALSCORE: 0
NEGVOCALIZATION: 0
BODYLANGUAGE: 0
NEGVOCALIZATION: 0
NEGVOCALIZATION: 0
BREATHING: 0
CONSOLABILITY: 0
CONSOLABILITY: 0
FACIALEXPRESSION: 0
FACIALEXPRESSION: 0
BREATHING: 0
NEGVOCALIZATION: 0
CONSOLABILITY: 0
BODYLANGUAGE: 0
BODYLANGUAGE: 0
BREATHING: 0
BODYLANGUAGE: 0
NEGVOCALIZATION: 0
BREATHING: 0
CONSOLABILITY: 0
TOTALSCORE: 0
CONSOLABILITY: 0
TOTALSCORE: 0
TOTALSCORE: 0

## 2020-07-19 ASSESSMENT — PAIN SCALES - GENERAL
PAINLEVEL_OUTOF10: 0
PAINLEVEL_OUTOF10: 3

## 2020-07-19 NOTE — H&P
Years of education: None    Highest education level: None   Occupational History    None   Social Needs    Financial resource strain: None    Food insecurity     Worry: None     Inability: None    Transportation needs     Medical: None     Non-medical: None   Tobacco Use    Smoking status: Never Smoker    Smokeless tobacco: Never Used   Substance and Sexual Activity    Alcohol use: No    Drug use: No    Sexual activity: Not Currently   Lifestyle    Physical activity     Days per week: None     Minutes per session: None    Stress: None   Relationships    Social connections     Talks on phone: None     Gets together: None     Attends Jain service: None     Active member of club or organization: None     Attends meetings of clubs or organizations: None     Relationship status: None    Intimate partner violence     Fear of current or ex partner: None     Emotionally abused: None     Physically abused: None     Forced sexual activity: None   Other Topics Concern    None   Social History Narrative    None       ROS: a ten point ROS with pertinent positives and negatives in HPI, otherwise negative. EXAM:  Blood pressure (!) 146/76, pulse 84, temperature 97.8 °F (36.6 °C), temperature source Temporal, resp. rate 16, height 4' 11.5\" (1.511 m), weight 122 lb (55.3 kg), SpO2 96 %. Gen:  awake, alert, cooperative, no apparent distress   EYES:  Lids and lashes normal, pupils equal, round and reactive to light, extra ocular muscles intact, sclera clear, conjunctiva normal  ENT:  Normocephalic, oral pharynx with moist mucus membranes, tonsils without erythema or exudates,  NECK:  Supple, symmetrical, trachea midline, no adenopathy,  LUNGS:  Clear to auscultate bilaterally, no rales ronchi or wheezing noted. CARDIOVASCULAR:  regular rate and rhythm, normal S1 and S2,no murmur/gallop/rub  ABDOMEN: Normal BS, Non tender, non distended, no HSM noted.   MUSCULOSKELETAL:  ROM of all extremities grossly Hyperlipidemia    Grade I diastolic dysfunction    Mild left ventricular hypertrophy    Mild aortic insufficiency    Mild mitral regurgitation    Tachycardia     ______________________________________________________________    Electronically signed by Shauna Mata MD on 7/19/2020 at 12:07 PM

## 2020-07-19 NOTE — PROGRESS NOTES
Patient asked to use restroom, attempted to toilet PT and was not able to void. Was confused asking to go to the restroom the entire time sitting on the toilet, then stated did not have to pee. Patient is back in bed resting.

## 2020-07-19 NOTE — PROGRESS NOTES
Called 4N at [facility] to check for the pt dentures. Spoke with Susy Charge Nurse. Susy looked and did not find the teeth. Spoke with ER in [facility], Rin Gutierrez in ER she transferred me to Security because that's where they turn belongings over to. Spoke with Security, they are supposed to call me back at [phone] to let me know if they have located them. 1715: [employer] security to check on progress of locating the dentures. They have not located the dentures.      This [facility] let the family know and they are going to contact [facility] to investigate further

## 2020-07-19 NOTE — BH NOTE
Notified patients daughter Stephanie Braxton) that due to patients current medical condition she will be transferred to the medical floor here at [facility]. She was able to verbalize understanding and states she would like to know when her mother is able to have visitors.

## 2020-07-19 NOTE — DISCHARGE SUMMARY
Department of Psychiatry    Discharge Summary    Bony An  [id number]    Admission date:   7/10/2020    Discharge:   Date: 07/19/20  [address] [place] [address]    Inpatient Provider: Melonie Lott D.O. 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 Patient was able to provide informed consent and outline the risks and benefits of medications. Bony An was  compliant with medications. Pt noted a significant reduction in her depression and anxiety during her  stay on [facility]. Pt noted that she slowly improved to the point that she   was comfortable andrequiring admission to inpatient hospital.  Pt noted she felt her medications were  working well and denied any current side effects. Treatment team encouraged  patient to stay out of bed and try to find activities to do, verbalized  understanding. Patient reported that she felt safe on the unit and comfortable  for discharge. Pt denied suicidal/homicidal ideations, denied any problems  or concerns with medications or side effects. patient voiced progression towards  treatment goals and was offered a copy of updated treatment plan completed  during visit today.  Denied any immediate needs or concerns. Pt throughout her stay on [facility] pt felt like her medications were working and  felt comfortable being discharged on these medications. Pt was advised to take  all medications as prescribed, follow up with all scheduled appointments and  abstain from any alcohol or illicit substances. Pt was in agreement. Pt felt  safe and comfortable to be discharge and to follow up with outpt mental health. Pt was very optimistic   about her D/C and returning to her home. Pt stated that she   was doing \"poor,\" today. Pt stated that she slept \"about 2-3 hours,\" last night. Pt stated that her appetite is \"poor. \"  Pt stated that she rates her depression a  \"0,\" on a scale of 0-10 with 10 being the worst and 0 being none. Pt stated  that she rates her anxiety an \"1/10,\" on the same scale. Pt denies any auditory  or visual hallucinations. Pt denied any thoughts to harm herself or anyone else. The Pt was educated primarily by verbal means about her diagnoses and their  manifestations in her life. The option for treatment including group individual  therapy programming was offered to her and the use of medications with all their  potential risks, benefits, and side-effects were discussed with the pt at  length. Pt was given the opportunity to ask questions and she participated in the  treatment and planning process. Pt felt ready and eager to be discharged from  the from the [facility] unit. Pt felt she was safe for this disposition. Pt was considered to be able to  participate in informed consent and decision-making with respect to medical,  legal and financial issues at the time of her discharge from the [facility]. Complications: admission to hospital       Treatment options, medications and alternatives reviewed with [facility] and they agree with the plan to discharge. Discharge on regular  diet, continue activity as tolerated.      Patient appears to be in stable condition and close to their baseline functioning. The patient denies suicidal or homicidal ideations and is showing future orientation. Patient no longer presented an imminent risk of danger to themselves and/or others. At the time of discharge it appears that the patient has received the maximum medical benefit from this hospitalization and can be appropriately managed with community treatment. Medication List      ASK your doctor about these medications    amiodarone 200 MG tablet  Commonly known as:  CORDARONE  Take 1 tablet by mouth daily     aspirin 81 MG tablet     carvedilol 25 MG tablet  Commonly known as:  COREG     cefdinir 300 MG capsule  Commonly known as:  OMNICEF  Take 1 capsule by mouth 2 times daily for 5 days  Ask about: Should I take this medication? diphenhydrAMINE 50 MG/ML injection  Commonly known as:  BENADRYL     docusate 100 MG Caps  Commonly known as:  COLACE, DULCOLAX  Take 100 mg by mouth 2 times daily     enoxaparin 30 MG/0.3ML injection  Commonly known as:  LOVENOX     haloperidol 0.5 MG tablet  Commonly known as:  HALDOL     levothyroxine 75 MCG tablet  Commonly known as:  SYNTHROID     losartan 50 MG tablet  Commonly known as:  COZAAR     MAGNESIUM-ZINC PO     QUEtiapine 25 MG tablet  Commonly known as:  SEROQUEL     vitamin B-12 1000 MCG tablet  Commonly known as:  CYANOCOBALAMIN            Social History     Socioeconomic History    Marital status:       Spouse name: Not on file    Number of children: Not on file    Years of education: Not on file    Highest education level: Not on file   Occupational History    Not on file   Social Needs    Financial resource strain: Not on file    Food insecurity     Worry: Not on file     Inability: Not on file    Transportation needs     Medical: Not on file     Non-medical: Not on file   Tobacco Use    Smoking status: Never Smoker    Smokeless tobacco: Never Used   Substance and Sexual Activity    Alcohol use: No    Drug use: No    Sexual activity: Not on file   Lifestyle    Physical activity     Days per week: Not on file     Minutes per session: Not on file    Stress: Not on file   Relationships    Social connections     Talks on phone: Not on file     Gets together: Not on file     Attends Episcopal service: Not on file     Active member of club or organization: Not on file     Attends meetings of clubs or organizations: Not on file     Relationship status: Not on file    Intimate partner violence     Fear of current or ex partner: Not on file     Emotionally abused: Not on file     Physically abused: Not on file     Forced sexual activity: Not on file   Other Topics Concern    Not on file   Social History Narrative    Not on file       Past Medical History:   Diagnosis Date    Dilated cardiomyopathy ([facility]     Grade I diastolic dysfunction 07/03/5525    Hyperlipidemia     Hypertension     Hypothyroidism     Ischemic cardiomyopathy     Mild aortic insufficiency 05/14/2019    Mild left ventricular hypertrophy 05/14/2019    Mild mitral regurgitation 05/14/2019    Myelodysplastic disease ([facility]     Non-rheumatic mitral regurgitation     PAF (paroxysmal atrial fibrillation) ([facility]     Subcortical vascular dementia with behavioral disturbance ([facility] [facility] 07/10/2020      Past Surgical History:   Procedure Laterality Date    HEMIARTHROPLASTY HIP Left 5/14/2019    HIP HEMIARTHROPLASTY performed by Olvin Echavarria MD at [address] [place] [address] Drive History     Tobacco Use    Smoking status: Never Smoker    Smokeless tobacco: Never Used   Substance Use Topics    Alcohol use: No      History reviewed. No pertinent family history.      Medications Prior to Admission: diphenhydrAMINE (BENADRYL) 50 MG/ML injection, Inject 25 mg into the muscle every 6 hours as needed  enoxaparin (LOVENOX) 30 MG/0.3ML injection, Inject 30 mg into the skin daily  haloperidol (HALDOL) 0.5 MG tablet, Take 0.5 mg by mouth 4 times daily  docusate sodium (COLACE, DULCOLAX) 100 MG CAPS, Take 100 mg by mouth 2 times daily  amiodarone (CORDARONE) 200 MG tablet, Take 1 tablet by mouth daily  carvedilol (COREG) 25 MG tablet, Take 12.5 mg by mouth 2 times daily (with meals)   levothyroxine (SYNTHROID) 75 MCG tablet, Take 75 mcg by mouth Daily. aspirin 81 MG tablet, Take 81 mg by mouth daily. [] cefdinir (OMNICEF) 300 MG capsule, Take 1 capsule by mouth 2 times daily for 5 days  QUEtiapine (SEROQUEL) 25 MG tablet, Take 50 mg by mouth nightly  vitamin B-12 (CYANOCOBALAMIN) 1000 MCG tablet, Take 1,000 mcg by mouth daily  losartan (COZAAR) 50 MG tablet, Take 50 mg by mouth daily  MAGNESIUM-ZINC PO, Take 1 tablet by mouth daily. Allergies   Allergen Reactions    Ibuprofen Other (See Comments)     Unknown     Pcn [Penicillins]         he patient verbalized understanding and agreement with the above information  LEGAL STATUS ON DISCHARGE:  voluntary  DISCHARGE DISPOSITION:   [facility]:  Stable for outpatient treatment  DISCHARGE DIET:  Resume previous home diet    ACTI VITES:  As tolerated    FOLLOWUP APPOINTMENT(S):  Per aftercare summary  CONSULTS:  [address] Directive POA was offered and reviewed with pt:      Talked to pts poa,went over patients benefits and other matters. Reveiwed financial options /programs ect. .. CORE MEASURES  -Was the patient discharged on two or more Antipsychotics? No    -If multiple Antipsychotic medications prescribed,is tapering recommended?n/a    ? If yes, document plan:  ?  -If multiple Antipsychotic medications prescribed at discharge, is cross tapering in process at D/C?n/a  -Have there been 3 or more failed attempts of mono therapy? No  ?  -If yes, list at least 3 of the failed Antipsychotic medications with the reason why each one failed: ?NA    -Was Hemoglobin A1C or fasting Glucose measure done within the last 12 months? Yes    -Lipid Panel measure done within the last 12 months? 1 %    Segs Absolute 3.9 K/CU MM    Lymphocytes Absolute 0.9 K/CU MM    Monocytes Absolute 0.7 K/CU MM    Eosinophils Absolute 0.2 K/CU MM    Basophils Absolute 0.1 K/CU MM    Nucleated RBC % 0.0 %    Total Nucleated RBC 0.0 K/CU MM    Total Immature Neutrophil 0.02 K/CU MM    Immature Neutrophil % 0.3 0 - 0.43 %   BMP    Collection Time: 07/08/20  4:38 PM   Result Value Ref Range    Sodium 138 135 - 145 MMOL/L    Potassium 4.3 3.5 - 5.1 MMOL/L    Chloride 102 99 - 110 mMol/L    CO2 26 21 - 32 MMOL/L    Anion Gap 10 4 - 16    BUN 29 (H) 6 - 23 MG/DL    CREATININE 0.8 0.6 - 1.1 MG/DL    Glucose 100 (H) 70 - 99 MG/DL    Calcium 9.3 8.3 - 10.6 MG/DL    GFR Non-African American >60 >60 mL/min/1.73m2    GFR African American >60 >60 mL/min/1.73m2   Urinalysis with microscopic    Collection Time: 07/08/20  5:00 PM   Result Value Ref Range    Color, UA YELLOW YELLOW    Clarity, UA SLIGHTLY CLOUDY (A) CLEAR    Glucose, Urine NEGATIVE NEGATIVE MG/DL    Bilirubin Urine NEGATIVE NEGATIVE MG/DL    Ketones, Urine NEGATIVE NEGATIVE MG/DL    Specific Gravity, UA 1.019 1.001 - 1.035    Blood, Urine NEGATIVE NEGATIVE    pH, Urine 7.0 5.0 - 8.0    Protein, UA NEGATIVE NEGATIVE MG/DL    Urobilinogen, Urine NORMAL 0.2 - 1.0 MG/DL    Nitrite Urine, Quantitative NEGATIVE NEGATIVE    Leukocyte Esterase, Urine LARGE (A) NEGATIVE    RBC, UA NONE SEEN 0 - 6 /HPF    WBC, UA 27 (H) 0 - 5 /HPF    Bacteria, UA NEGATIVE NEGATIVE /HPF    WBC Clumps, UA RARE /HPF    Squam Epithel, UA 6 /HPF    Trichomonas, UA NONE SEEN NONE SEEN /HPF    Amorphous, UA RARE /HPF   Culture, Urine    Collection Time: 07/08/20  5:00 PM    Specimen: Urine, clean catch   Result Value Ref Range    Specimen URINE CLEAN CATCH     Special Requests NONE     Culture       <50,000 CFU/ml mixed skin/urogenital kassidy.  No further workup   Vitamin B12 & folate    Collection Time: 07/08/20  9:34 PM   Result Value Ref Range    Vitamin B-12 1277 (H) 211 - 911 pg/ml    Folate >20.0 (H) 3.1 - 17.5 NG/ML   TSH without Reflex    Collection Time: 07/08/20  9:34 PM   Result Value Ref Range    TSH, High Sensitivity 2.920 0.270 - 4.20 uIu/ml   COVID-19    Collection Time: 07/10/20 11:07 AM    Specimen: Nasopharyngeal Swab   Result Value Ref Range    Source THROAT     SARS-CoV-2, NAAT NOT DETECTED    Hemoglobin A1c    Collection Time: 07/11/20  6:40 AM   Result Value Ref Range    Hemoglobin A1C 5.5 4.2 - 6.3 %    eAG 111 mg/dL   Lipid panel - fasting    Collection Time: 07/11/20  6:40 AM   Result Value Ref Range    Triglycerides 71 <150 MG/DL    Cholesterol 166 <200 MG/DL    HDL 63 >40 MG/DL    LDL Direct 86 <100 MG/DL   CBC auto differential    Collection Time: 07/11/20  6:40 AM   Result Value Ref Range    WBC 4.4 4.0 - 10.5 K/CU MM    RBC 3.79 (L) 4.2 - 5.4 M/CU MM    Hemoglobin 10.5 (L) 12.5 - 16.0 GM/DL    Hematocrit 34.6 (L) 37 - 47 %    MCV 91.3 78 - 100 FL    MCH 27.7 27 - 31 PG    MCHC 30.3 (L) 32.0 - 36.0 %    RDW 14.9 11.7 - 14.9 %    Platelets 476 599 - 912 K/CU MM    MPV 8.8 7.5 - 11.1 FL    Differential Type AUTOMATED DIFFERENTIAL     Segs Relative 58.4 36 - 66 %    Lymphocytes % 22.1 (L) 24 - 44 %    Monocytes % 13.7 (H) 0 - 4 %    Eosinophils % 4.7 (H) 0 - 3 %    Basophils % 0.9 0 - 1 %    Segs Absolute 2.6 K/CU MM    Lymphocytes Absolute 1.0 K/CU MM    Monocytes Absolute 0.6 K/CU MM    Eosinophils Absolute 0.2 K/CU MM    Basophils Absolute 0.0 K/CU MM    Immature Neutrophil % 0.2 0 - 0.43 %    Total Immature Neutrophil 0.01 K/CU MM   Ammonia    Collection Time: 07/11/20  6:40 AM   Result Value Ref Range    Ammonia 32 11 - 51 UMOL/L   Comprehensive Metabolic Panel w/ Reflex to MG    Collection Time: 07/11/20  6:40 AM   Result Value Ref Range    Sodium 140 135 - 145 MMOL/L    Potassium 3.8 3.5 - 5.1 MMOL/L    Chloride 102 99 - 110 mMol/L    CO2 25 21 - 32 MMOL/L    BUN 21 6 - 23 MG/DL    CREATININE 0.8 0.6 - 1.1 MG/DL    Glucose 72 70 - 99 MG/DL    Calcium 9.0 8.3 - 10.6 MG/DL    Alb 3.7 3.4 - 5.0 GM/DL    Total Protein 6.6 6.4 - 8.2 GM/DL    Total Bilirubin 1.2 (H) 0.0 - 1.0 MG/DL    ALT 12 10 - 40 U/L    AST 29 15 - 37 IU/L    Alkaline Phosphatase 78 40 - 129 IU/L    GFR Non-African American >60 >60 mL/min/1.73m2    GFR African American >60 >60 mL/min/1.73m2    Anion Gap 13 4 - 16   Vitamin D 25 hydroxy    Collection Time: 07/11/20  6:40 AM   Result Value Ref Range    Vit D, 25-Hydroxy 34.08 >20 NG/ML   EKG 12 Lead    Collection Time: 07/17/20  2:54 PM   Result Value Ref Range    Ventricular Rate 66 BPM    Atrial Rate 66 BPM    P-R Interval 178 ms    QRS Duration 104 ms    Q-T Interval 432 ms    QTc Calculation (Bazett) 452 ms    P Axis 50 degrees    R Axis -17 degrees    T Axis 47 degrees    Diagnosis       Normal sinus rhythm  Voltage criteria for left ventricular hypertrophy  Abnormal ECG  When compared with ECG of 08-JUL-2020 16:27,  No significant change was found  Confirmed by JAC Ramirez ([id number]) on 7/18/2020 4:45:38 PM     Basic metabolic panel    Collection Time: 07/18/20  6:30 PM   Result Value Ref Range    Sodium 142 135 - 145 MMOL/L    Potassium 3.9 3.5 - 5.1 MMOL/L    Chloride 102 99 - 110 mMol/L    CO2 19 (L) 21 - 32 MMOL/L    Anion Gap 21 (H) 4 - 16    BUN 25 (H) 6 - 23 MG/DL    CREATININE 0.8 0.6 - 1.1 MG/DL    Glucose 112 (H) 70 - 99 MG/DL    Calcium 10.3 8.3 - 10.6 MG/DL    GFR Non-African American >60 >60 mL/min/1.73m2    GFR African American >60 >60 mL/min/1.73m2       40 Minutes    Electronically signed by CELI Fontanez CNP on 7/19/2020 at 9:55 AM

## 2020-07-19 NOTE — PROGRESS NOTES
RN performed morning vitals, HR read 175, RN auscultated heart rate of 200. BP stable. Dr Jarek Mejia notified and rapid response called. EKG completed. Transfer order to medical unit. Family notified. AM meds given.

## 2020-07-19 NOTE — PROGRESS NOTES
Admission skin assessment completed by this RN and Juan Robert. Pt noted to have scattered bruising on BUE and BLE. No other issues noted.

## 2020-07-19 NOTE — PLAN OF CARE
Problem: Falls - Risk of:  Goal: Will remain free from falls  Description: Will remain free from falls  7/19/2020 0158 by Scott Collins RN  Outcome: Ongoing  7/18/2020 1504 by Agustín Layne RN  Outcome: Ongoing  Goal: Absence of physical injury  Description: Absence of physical injury  7/19/2020 0158 by Scott Collins RN  Outcome: Ongoing  7/18/2020 1504 by Agustín Layne RN  Outcome: Ongoing     Problem: Anger Management/Homicidal Ideation:  Goal: Able to display appropriate communication and problem solving  Description: Able to display appropriate communication and problem solving  7/19/2020 0158 by Scott Collins RN  Outcome: Ongoing  7/18/2020 1504 by Agustín Layne RN  Outcome: Ongoing  Goal: Ability to verbalize frustrations and anger appropriately will improve  Description: Ability to verbalize frustrations and anger appropriately will improve  7/19/2020 0158 by Scott Collins RN  Outcome: Ongoing  7/18/2020 1504 by Agustín Layne RN  Outcome: Ongoing  Goal: Absence of angry outbursts  Description: Absence of angry outbursts  7/19/2020 0158 by Scott Collins RN  Outcome: Ongoing  7/18/2020 1504 by Agustín Layne RN  Outcome: Ongoing     Problem: Altered Mood, Depressive Behavior:  Goal: Able to verbalize and/or display a decrease in depressive symptoms  Description: Able to verbalize and/or display a decrease in depressive symptoms  7/19/2020 0158 by Scott Collins RN  Outcome: Ongoing  7/18/2020 1504 by Agustín Layne RN  Outcome: Ongoing     Problem: Altered Mood, Deterioration in Function:  Goal: Maintenance of adequate nutrition will improve  Description: Maintenance of adequate nutrition will improve  7/19/2020 0158 by Scott Collins RN  Outcome: Ongoing  7/18/2020 1504 by Agustín Layne RN  Outcome: Ongoing     Problem: Altered Mood, Manic Behavior:  Goal: Able to sleep  Description: Able to sleep  7/19/2020 0158 by Scott Collins RN  Outcome: Ongoing  7/18/2020 1504 by Nereida Braxton RN  Outcome: Ongoing  Goal: Mood stable  Description: Mood stable  7/19/2020 0158 by Fannie Faith RN  Outcome: Ongoing  7/18/2020 1504 by Nereida Braxton RN  Outcome: Ongoing     Problem: Skin Integrity:  Goal: Will show no infection signs and symptoms  Description: Will show no infection signs and symptoms  7/19/2020 0158 by Fannie Faith RN  Outcome: Ongoing  7/18/2020 1504 by Nereida Braxton RN  Outcome: Ongoing  Goal: Absence of new skin breakdown  Description: Absence of new skin breakdown  7/19/2020 0158 by Fannie Faith RN  Outcome: Ongoing  7/18/2020 1504 by Nereida Braxton RN  Outcome: Ongoing     Problem: Pain:  Description: Pain management should include both nonpharmacologic and pharmacologic interventions.   Goal: Pain level will decrease  Description: Pain level will decrease  7/19/2020 0158 by Fannie Faith RN  Outcome: Ongoing  7/18/2020 1504 by Nereida Braxton RN  Outcome: Ongoing  Goal: Control of acute pain  Description: Control of acute pain  7/19/2020 0158 by Fannie Faith RN  Outcome: Ongoing  7/18/2020 1504 by Nereida Braxton RN  Outcome: Ongoing  Goal: Control of chronic pain  Description: Control of chronic pain  7/19/2020 0158 by Fannie Faith RN  Outcome: Ongoing  7/18/2020 1504 by Nereida Braxton RN  Outcome: Ongoing

## 2020-07-19 NOTE — PROGRESS NOTES
Pt observed to rest in bed through night but slept intermittently for short periods only. Pt was compliant with medications only after several attempts and much encouragement. Pt is confused alert to self only and does not appear to understand what is being said to her, answers given are not related to what is being asked. Staff offers po fluids throughout night but Pt refuses. Bed exit alarm in place.

## 2020-07-19 NOTE — PROGRESS NOTES
Rapid response for non-sustained arythmia  - staff did not give patient her morning medication of amiodarone and her heart does not sustain ths atrial fib rhythem, ekg is pending, all workup is pending. If needs admit np will have to place discharge re-admit orders  - will harshil sitter  - hemodynamically stable (although reported by staff to not be perfusing despite stable blood pressure with systolic>100, YTC>94)    - will await admit and place any further orders. - its highly recommended that the patient be given her amiodarone.

## 2020-07-19 NOTE — PROGRESS NOTES
Patients Daughter Orville Marte would like an update in the morning regarding her mother and what the plan is.     Orville Marte (daughter): [phone]

## 2020-07-20 ENCOUNTER — HOSPITAL ENCOUNTER (INPATIENT)
Age: 85
LOS: 1 days | Discharge: HOME HEALTH | DRG: 884 | End: 2020-07-21
Attending: PSYCHIATRY & NEUROLOGY | Admitting: PSYCHIATRY & NEUROLOGY
Payer: COMMERCIAL

## 2020-07-20 VITALS
RESPIRATION RATE: 15 BRPM | TEMPERATURE: 97.4 F | HEART RATE: 82 BPM | HEIGHT: 60 IN | SYSTOLIC BLOOD PRESSURE: 114 MMHG | BODY MASS INDEX: 23.95 KG/M2 | DIASTOLIC BLOOD PRESSURE: 73 MMHG | OXYGEN SATURATION: 97 % | WEIGHT: 122 LBS

## 2020-07-20 PROCEDURE — 6370000000 HC RX 637 (ALT 250 FOR IP): Performed by: INTERNAL MEDICINE

## 2020-07-20 PROCEDURE — 99223 1ST HOSP IP/OBS HIGH 75: CPT | Performed by: NURSE PRACTITIONER

## 2020-07-20 PROCEDURE — G0378 HOSPITAL OBSERVATION PER HR: HCPCS

## 2020-07-20 PROCEDURE — 1240000000 HC EMOTIONAL WELLNESS R&B

## 2020-07-20 PROCEDURE — 97162 PT EVAL MOD COMPLEX 30 MIN: CPT

## 2020-07-20 PROCEDURE — 6370000000 HC RX 637 (ALT 250 FOR IP): Performed by: NURSE PRACTITIONER

## 2020-07-20 PROCEDURE — 97166 OT EVAL MOD COMPLEX 45 MIN: CPT

## 2020-07-20 RX ORDER — DRONABINOL 2.5 MG/1
2.5 CAPSULE ORAL 2 TIMES DAILY WITH MEALS
Status: DISCONTINUED | OUTPATIENT
Start: 2020-07-20 | End: 2020-07-21 | Stop reason: HOSPADM

## 2020-07-20 RX ORDER — CARVEDILOL 12.5 MG/1
12.5 TABLET ORAL 2 TIMES DAILY WITH MEALS
Status: CANCELLED | OUTPATIENT
Start: 2020-07-20

## 2020-07-20 RX ORDER — LANOLIN ALCOHOL/MO/W.PET/CERES
1000 CREAM (GRAM) TOPICAL DAILY
Status: DISCONTINUED | OUTPATIENT
Start: 2020-07-21 | End: 2020-07-21 | Stop reason: HOSPADM

## 2020-07-20 RX ORDER — LOSARTAN POTASSIUM 50 MG/1
50 TABLET ORAL DAILY
Status: DISCONTINUED | OUTPATIENT
Start: 2020-07-21 | End: 2020-07-21 | Stop reason: HOSPADM

## 2020-07-20 RX ORDER — LOSARTAN POTASSIUM 50 MG/1
50 TABLET ORAL DAILY
Status: CANCELLED | OUTPATIENT
Start: 2020-07-20

## 2020-07-20 RX ORDER — LEVOTHYROXINE SODIUM 0.07 MG/1
75 TABLET ORAL DAILY
Status: CANCELLED | OUTPATIENT
Start: 2020-07-21

## 2020-07-20 RX ORDER — ACETAMINOPHEN 325 MG/1
650 TABLET ORAL EVERY 4 HOURS PRN
Status: CANCELLED | OUTPATIENT
Start: 2020-07-20

## 2020-07-20 RX ORDER — LANOLIN ALCOHOL/MO/W.PET/CERES
200 CREAM (GRAM) TOPICAL DAILY
Status: DISCONTINUED | OUTPATIENT
Start: 2020-07-21 | End: 2020-07-21 | Stop reason: HOSPADM

## 2020-07-20 RX ORDER — CARVEDILOL 12.5 MG/1
12.5 TABLET ORAL 2 TIMES DAILY WITH MEALS
Status: DISCONTINUED | OUTPATIENT
Start: 2020-07-20 | End: 2020-07-21 | Stop reason: HOSPADM

## 2020-07-20 RX ORDER — AMIODARONE HYDROCHLORIDE 200 MG/1
200 TABLET ORAL DAILY
Status: DISCONTINUED | OUTPATIENT
Start: 2020-07-21 | End: 2020-07-21 | Stop reason: HOSPADM

## 2020-07-20 RX ORDER — CEFDINIR 300 MG/1
300 CAPSULE ORAL EVERY 12 HOURS SCHEDULED
Status: DISCONTINUED | OUTPATIENT
Start: 2020-07-20 | End: 2020-07-21 | Stop reason: HOSPADM

## 2020-07-20 RX ORDER — MIRTAZAPINE 15 MG/1
15 TABLET, FILM COATED ORAL NIGHTLY
Status: CANCELLED | OUTPATIENT
Start: 2020-07-20

## 2020-07-20 RX ORDER — LANOLIN ALCOHOL/MO/W.PET/CERES
1000 CREAM (GRAM) TOPICAL DAILY
Status: CANCELLED | OUTPATIENT
Start: 2020-07-20

## 2020-07-20 RX ORDER — AMIODARONE HYDROCHLORIDE 200 MG/1
200 TABLET ORAL DAILY
Status: CANCELLED | OUTPATIENT
Start: 2020-07-20

## 2020-07-20 RX ORDER — CEFDINIR 300 MG/1
300 CAPSULE ORAL EVERY 12 HOURS SCHEDULED
Status: CANCELLED | OUTPATIENT
Start: 2020-07-20

## 2020-07-20 RX ORDER — QUETIAPINE FUMARATE 50 MG/1
50 TABLET, FILM COATED ORAL NIGHTLY
Status: DISCONTINUED | OUTPATIENT
Start: 2020-07-20 | End: 2020-07-21 | Stop reason: HOSPADM

## 2020-07-20 RX ORDER — TRAZODONE HYDROCHLORIDE 50 MG/1
50 TABLET ORAL NIGHTLY PRN
Status: CANCELLED | OUTPATIENT
Start: 2020-07-20

## 2020-07-20 RX ORDER — TRAZODONE HYDROCHLORIDE 50 MG/1
50 TABLET ORAL NIGHTLY PRN
Status: DISCONTINUED | OUTPATIENT
Start: 2020-07-20 | End: 2020-07-21 | Stop reason: HOSPADM

## 2020-07-20 RX ORDER — ASPIRIN 81 MG/1
81 TABLET, CHEWABLE ORAL DAILY
Status: CANCELLED | OUTPATIENT
Start: 2020-07-20

## 2020-07-20 RX ORDER — DONEPEZIL HYDROCHLORIDE 5 MG/1
5 TABLET, FILM COATED ORAL NIGHTLY
Status: CANCELLED | OUTPATIENT
Start: 2020-07-20

## 2020-07-20 RX ORDER — LEVOTHYROXINE SODIUM 0.07 MG/1
75 TABLET ORAL DAILY
Status: DISCONTINUED | OUTPATIENT
Start: 2020-07-21 | End: 2020-07-21 | Stop reason: HOSPADM

## 2020-07-20 RX ORDER — DONEPEZIL HYDROCHLORIDE 5 MG/1
5 TABLET, FILM COATED ORAL NIGHTLY
Status: DISCONTINUED | OUTPATIENT
Start: 2020-07-20 | End: 2020-07-21 | Stop reason: HOSPADM

## 2020-07-20 RX ORDER — QUETIAPINE FUMARATE 25 MG/1
50 TABLET, FILM COATED ORAL NIGHTLY
Status: CANCELLED | OUTPATIENT
Start: 2020-07-20

## 2020-07-20 RX ORDER — POLYETHYLENE GLYCOL 3350 17 G/17G
17 POWDER, FOR SOLUTION ORAL DAILY PRN
Status: CANCELLED | OUTPATIENT
Start: 2020-07-20

## 2020-07-20 RX ORDER — LANOLIN ALCOHOL/MO/W.PET/CERES
200 CREAM (GRAM) TOPICAL DAILY
Status: CANCELLED | OUTPATIENT
Start: 2020-07-20

## 2020-07-20 RX ORDER — MIRTAZAPINE 15 MG/1
15 TABLET, ORALLY DISINTEGRATING ORAL NIGHTLY
Status: DISCONTINUED | OUTPATIENT
Start: 2020-07-20 | End: 2020-07-21 | Stop reason: HOSPADM

## 2020-07-20 RX ORDER — POLYETHYLENE GLYCOL 3350 17 G/17G
17 POWDER, FOR SOLUTION ORAL DAILY PRN
Status: DISCONTINUED | OUTPATIENT
Start: 2020-07-20 | End: 2020-07-21 | Stop reason: HOSPADM

## 2020-07-20 RX ORDER — CEFDINIR 300 MG/1
300 CAPSULE ORAL EVERY 12 HOURS SCHEDULED
Status: DISCONTINUED | OUTPATIENT
Start: 2020-07-20 | End: 2020-07-20

## 2020-07-20 RX ORDER — ACETAMINOPHEN 160 MG/5ML
650 SOLUTION ORAL EVERY 4 HOURS PRN
Status: DISCONTINUED | OUTPATIENT
Start: 2020-07-20 | End: 2020-07-21 | Stop reason: HOSPADM

## 2020-07-20 RX ORDER — ASPIRIN 81 MG/1
81 TABLET, CHEWABLE ORAL DAILY
Status: DISCONTINUED | OUTPATIENT
Start: 2020-07-21 | End: 2020-07-21 | Stop reason: HOSPADM

## 2020-07-20 RX ADMIN — DONEPEZIL HYDROCHLORIDE 5 MG: 5 TABLET, FILM COATED ORAL at 20:21

## 2020-07-20 RX ADMIN — Medication 200 MG: at 09:14

## 2020-07-20 RX ADMIN — ASPIRIN 81 MG 81 MG: 81 TABLET ORAL at 09:15

## 2020-07-20 RX ADMIN — LOSARTAN POTASSIUM 50 MG: 50 TABLET ORAL at 09:15

## 2020-07-20 RX ADMIN — LEVOTHYROXINE SODIUM 75 MCG: 0.07 TABLET ORAL at 06:09

## 2020-07-20 RX ADMIN — MIRTAZAPINE 15 MG: 15 TABLET, ORALLY DISINTEGRATING ORAL at 20:21

## 2020-07-20 RX ADMIN — QUETIAPINE FUMARATE 50 MG: 50 TABLET ORAL at 20:21

## 2020-07-20 RX ADMIN — CEFDINIR 300 MG: 300 CAPSULE ORAL at 09:14

## 2020-07-20 RX ADMIN — AMIODARONE HYDROCHLORIDE 200 MG: 200 TABLET ORAL at 09:34

## 2020-07-20 RX ADMIN — CYANOCOBALAMIN TAB 1000 MCG 1000 MCG: 1000 TAB at 09:15

## 2020-07-20 RX ADMIN — DRONABINOL 2.5 MG: 2.5 CAPSULE ORAL at 16:45

## 2020-07-20 RX ADMIN — CARVEDILOL 12.5 MG: 12.5 TABLET, FILM COATED ORAL at 09:15

## 2020-07-20 RX ADMIN — DOCUSATE SODIUM 100 MG: 50 LIQUID ORAL at 09:14

## 2020-07-20 ASSESSMENT — SLEEP AND FATIGUE QUESTIONNAIRES
DIFFICULTY FALLING ASLEEP: YES
SLEEP PATTERN: DIFFICULTY FALLING ASLEEP;EARLY AWAKENING;RESTLESSNESS
AVERAGE NUMBER OF SLEEP HOURS: 3
DIFFICULTY STAYING ASLEEP: YES
RESTFUL SLEEP: NO
DIFFICULTY ARISING: NO
DO YOU USE A SLEEP AID: NO
DO YOU HAVE DIFFICULTY SLEEPING: YES

## 2020-07-20 ASSESSMENT — PAIN DESCRIPTION - PROGRESSION
CLINICAL_PROGRESSION: NOT CHANGED

## 2020-07-20 ASSESSMENT — PAIN SCALES - PAIN ASSESSMENT IN ADVANCED DEMENTIA (PAINAD)
FACIALEXPRESSION: 0
NEGVOCALIZATION: 0
BODYLANGUAGE: 0
FACIALEXPRESSION: 0
BODYLANGUAGE: 0
TOTALSCORE: 0
BODYLANGUAGE: 0
FACIALEXPRESSION: 0
CONSOLABILITY: 0
BODYLANGUAGE: 0
BREATHING: 0
CONSOLABILITY: 0
CONSOLABILITY: 0
BREATHING: 0
BODYLANGUAGE: 0
BREATHING: 0
TOTALSCORE: 0
BREATHING: 0
CONSOLABILITY: 0
FACIALEXPRESSION: 0
BREATHING: 0
TOTALSCORE: 0
FACIALEXPRESSION: 0
NEGVOCALIZATION: 0
NEGVOCALIZATION: 0
FACIALEXPRESSION: 0
TOTALSCORE: 0
NEGVOCALIZATION: 0
CONSOLABILITY: 0
BREATHING: 0
NEGVOCALIZATION: 0
NEGVOCALIZATION: 0
TOTALSCORE: 0
FACIALEXPRESSION: 0
BODYLANGUAGE: 0
BREATHING: 0
CONSOLABILITY: 0
CONSOLABILITY: 0
BODYLANGUAGE: 0
TOTALSCORE: 0
TOTALSCORE: 0
NEGVOCALIZATION: 0

## 2020-07-20 ASSESSMENT — PAIN SCALES - GENERAL: PAINLEVEL_OUTOF10: 0

## 2020-07-20 ASSESSMENT — PAIN DESCRIPTION - LOCATION: LOCATION: BACK

## 2020-07-20 NOTE — PLAN OF CARE
Problem: Pain:  Goal: Pain level will decrease  Description: Pain level will decrease  Outcome: Ongoing  Goal: Control of acute pain  Description: Control of acute pain  Outcome: Ongoing  Goal: Control of chronic pain  Description: Control of chronic pain  Outcome: Ongoing     Problem: Skin Integrity:  Goal: Will show no infection signs and symptoms  Description: Will show no infection signs and symptoms  Outcome: Ongoing  Goal: Absence of new skin breakdown  Description: Absence of new skin breakdown  Outcome: Ongoing     Problem: Falls - Risk of:  Goal: Will remain free from falls  Description: Will remain free from falls  Outcome: Ongoing  Goal: Absence of physical injury  Description: Absence of physical injury  Outcome: Ongoing     Problem: Falls - Risk of:  Goal: Will remain free from falls  Description: Will remain free from falls  Outcome: Ongoing  Goal: Absence of physical injury  Description: Absence of physical injury  Outcome: Ongoing     Problem: Falls - Risk of:  Goal: Will remain free from falls  Description: Will remain free from falls  Outcome: Ongoing  Goal: Absence of physical injury  Description: Absence of physical injury  Outcome: Ongoing     Problem: Anger Management/Homicidal Ideation:  Goal: Able to display appropriate communication and problem solving  Description: Able to display appropriate communication and problem solving  Outcome: Ongoing  Goal: Absence of angry outbursts  Description: Absence of angry outbursts  Outcome: Ongoing     Problem: Altered Mood, Depressive Behavior:  Goal: Able to verbalize and/or display a decrease in depressive symptoms  Description: Able to verbalize and/or display a decrease in depressive symptoms  Outcome: Ongoing

## 2020-07-20 NOTE — DISCHARGE INSTR - COC
Continuity of Care Form    Patient Name: Jin Jansen   :  10/2/1931  MRN:  [id number]    Admit date:  2020  Discharge date:  2020    Code Status Order: Full Code   Advance Directives:   [facility] Documentation     Date/Time Healthcare Directive Type of Healthcare Directive Copy in [facility] [address] Agent's Name Healthcare Agent's Phone Number    20 [phone]  No, patient does not have an advance directive for healthcare treatment -- -- -- -- --          Admitting Physician:  Tia Quintero MD  PCP: Trent Allen MD    Discharging Nurse: [address] Unit/Room#: 007/007-01  Discharging Unit Phone Number: [phone]    Emergency Contact:   Extended Emergency Contact Information  Primary Emergency Contact: [address] Phone: [phone]  Relation: Child  Secondary Emergency Contact: [place] Phone: [phone]  Relation: Child    Past Surgical History:  Past Surgical History:   Procedure Laterality Date    HEMIARTHROPLASTY HIP Left 2019    HIP HEMIARTHROPLASTY performed by Julieta Qureshi MD at [facility]       Immunization History: There is no immunization history on file for this patient.     Active Problems:  Patient Active Problem List   Diagnosis Code    Closed displaced fracture of left femoral neck ([facility] S72.002A    Thyroid disease E07.9    Hypertension I10    CAD (coronary artery disease) I25.10    Myelodysplastic disease ([address] C94.6    Dementia, in, senility, with behavioral disturbance ([address] F03.91    Acute metabolic encephalopathy L23.42    Subcortical vascular dementia with behavioral disturbance ([address] F01.51    Hypothyroidism E03.9    Non-rheumatic mitral regurgitation I34.0    Hyperlipidemia E78.5    Grade I diastolic dysfunction T33.6    Mild left ventricular hypertrophy I51.7    Mild aortic insufficiency I35.1    Mild mitral regurgitation I34.0    Tachycardia R00.0 Isolation/Infection:   Isolation          No Isolation        Patient Infection Status     None to display          Nurse Assessment:  Last Vital Signs: /73   Pulse 79   Temp 97.4 °F (36.3 °C)   Resp 15   Ht 4' 11.5\" (1.511 m)   Wt 122 lb (55.3 kg)   SpO2 97%   BMI 24.23 kg/m²     Last documented pain score (0-10 scale): Pain Level: 0  Last Weight:   Wt Readings from Last 1 Encounters:   07/19/20 122 lb (55.3 kg)     Mental Status:  disoriented    IV Access:  - None    Nursing Mobility/ADLs:  Walking   Assisted  Transfer  Assisted  Bathing  Assisted  Dressing  Assisted  Toileting  Assisted  Feeding  Independent  Med Admin  Assisted  Med Delivery   crushed and prefers mixed with pudding    Wound Care Documentation and Therapy:        Elimination:  Continence:   · Bowel: Yes  · Bladder: Yes  Urinary Catheter: None   Colostomy/Ileostomy/Ileal Conduit: No       Date of Last BM: unknown  No intake or output data in the 24 hours ending 07/20/20 0958  No intake/output data recorded. Safety Concerns:     History of Falls (last 30 days)    Impairments/Disabilities:      confusion    Nutrition Therapy:  Current Nutrition Therapy:   - Oral Diet:  General and Dental Soft    Routes of Feeding: Oral  Liquids: Thin Liquids  Daily Fluid Restriction: no  Last Modified Barium Swallow with Video (Video Swallowing Test): not done    Treatments at the Time of Hospital Discharge:   Respiratory Treatments: none  Oxygen Therapy:  is not on home oxygen therapy.   Ventilator:    - No ventilator support    Rehab Therapies: Physical Therapy and Occupational Therapy  Weight Bearing Status/Restrictions: No weight bearing restirctions  Other Medical Equipment (for information only, NOT a DME order):  wheelchair and bedside commode  Other Treatments: ***    Patient's personal belongings (please select all that are sent with patient):  Glasses, cane, clothing    RN SIGNATURE:  Electronically signed by Carlota English RN on 7/20/20 at 10:03 AM EDT    CASE MANAGEMENT/SOCIAL WORK SECTION    Inpatient Status Date: ***    Readmission Risk Assessment Score:  Readmission Risk              Risk of Unplanned Readmission:        0           Discharging to Facility/ Agency   · Name:   · Address:  · Phone:  · Fax:    Dialysis Facility (if applicable)   · Name:  · Address:  · Dialysis Schedule:  · Phone:  · Fax:    / signature: {Esignature:[id number]}    PHYSICIAN SECTION    Prognosis: {Prognosis:3354212457}    Condition at Discharge: [address] [place] [address] Patient Condition:[id number]}    Rehab Potential (if transferring to Rehab): {Prognosis:7271596725}    Recommended Labs or Other Treatments After Discharge: ***    Physician Certification: I certify the above information and transfer of Caitlyn Vance  is necessary for the continuing treatment of the diagnosis listed and that she requires {Admit to Appropriate Level of Care:23934} for {GREATER/LESS:418016244} 30 days.      Update Admission H&P: {CHP DME Changes in FYWDN:694552811}    PHYSICIAN SIGNATURE:  {Esignature:[id number]}

## 2020-07-20 NOTE — PROGRESS NOTES
Patient asked to use the restroom. Pt assisted to toilet. Voided. Pt back in bed and resting comfortably.

## 2020-07-20 NOTE — BH NOTE
[facility]  Admission Note     Admission Type:   Admission Type: Voluntary(2 person phone approval by daughter David Sen = filed for emergency guardian)    Reason for admission:  Reason for Admission: Dementia with Behavioral Disturbance    PATIENT STRENGTHS:  Strengths: Positive Support    Patient Strengths and Limitations:  Limitations: Unrealistic self-view, General negative or hopeless attitude about future/recovery    Addictive Behavior:   Addictive Behavior  In the past 3 months, have you felt or has someone told you that you have a problem with:  : (denies on admission)  Do you have a history of Chemical Use?: (denies on admission)  Do you have a history of Alcohol Use?: (denies on admission)  Do you have a history of Street Drug Abuse?: (denies on admission)  Histroy of Prescripton Drug Abuse?: (denies on admission)    Medical Problems:   Past Medical History:   Diagnosis Date    Dilated cardiomyopathy ([facility] [address]     Grade I diastolic dysfunction 16/39/3296    Hyperlipidemia     Hypertension     Hypothyroidism     Ischemic cardiomyopathy     Mild aortic insufficiency 05/14/2019    Mild left ventricular hypertrophy 05/14/2019    Mild mitral regurgitation 05/14/2019    Myelodysplastic disease ([facility] [address]     Non-rheumatic mitral regurgitation     PAF (paroxysmal atrial fibrillation) ([facility] [facility]     Subcortical vascular dementia with behavioral disturbance ([facility] 07/10/2020       Status EXAM:  Status and Exam  Normal: No  Facial Expression: Flat  Affect: Blunt  Level of Consciousness: Confused  Mood:Normal: No  Mood: Depressed  Motor Activity:Normal: No  Motor Activity: Decreased  Interview Behavior: Cooperative  Preception: [place] to Person  Attention:Normal: No  Attention: Unable to Concentrate  Thought Content:Normal: No  Thought Content: Obsessions  Hallucinations: None  Delusions: No  Memory:Normal: No  Memory: Poor Recent  Insight and Judgment: No  Insight and Judgment: Poor Insight  Present Suicidal Ideation: No  Present Homicidal Ideation: No    Tobacco Screening:  Practical Counseling, on admission, john X, if applicable and completed (first 3 are required if patient doesn't refuse):            ( )  Recognizing danger situations (included triggers and roadblocks)                    ( )  Coping skills (new ways to manage stress, exercise, relaxation techniques, changing routine, distraction)                                                           ( )  Basic information about quitting (benefits of quitting, techniques in how to quit, available resources  ( ) Referral for counseling faxed to [facility]                                           ( ) Patient refused counseling  ( ) Patient has not smoked in the last 30 days      Pt admitted with followings belongings:  Dentures: None  Vision - Corrective Lenses: None  Hearing Aid: None  Jewelry: None  Body Piercings Removed: N/A  Clothing: Footwear, Pants, Shirt, Sweater, Undergarments (Comment), Pajamas(2 underwear 1 bra)  Were All Patient Medications Collected?: No  Other Valuables: Cane     Documented placed in locker #[id number]. Patient oriented to surroundings and program expectations and copy of patient rights given. Patient offered Psychiatric Advanced Directive and refused. Received admission packet, thought she was not able to sign any of the paperwork at admission. Consents reviewed, but not signed. Voluntary admission approved over the telephone with 2 witnesses. Daughter Ernesto Up is seeking emergency guardianship. Patient is unable to verbalize understanding. [facility] to the [facility] from the Inpatient unit [facility] where she was hospitalized on 07/19/2020 for tachycardia. She returned to the [facility] as a voluntary admission with a diagnosis of Dementia with Behavioral Disturbance to Dr. Monterroso Has service to room # 008 2006. She arrived to the unit via wheelchair. No overt distress has been noted this shift.   She has been medication compliant. She was able to eat a small amount, however better than she is reported to normally intake. Patient has also been noted to be communicating her needs verbally and has been much more directable. No outburst of anger or agitation.  Aren Cisneros RN

## 2020-07-20 NOTE — H&P
Initial Psychiatric History and Physical    Martiisrael Walker  [id number]  7/20/2020 07/20/20    ID: Patient is a 80 yrs y.o. female    CC: \"I don't know. \"    HPI: HPI:  Tato Hendrix is an 80year old  female with PMH of dementia, depression, HTN, Hypothyroidism, CAD and myelodysplastic disease. Patient presented to [facility] ED after becoming aggressive at home. Per ED notes patient attempted to break windows at home with her cane, threatened suicide and homicide. Psychiatry was consulted by Yin Middleton CNP for suicide and homicide attempt.     During today's interview the patient is alert and oriented to self only. She is not aware of why she is in the hospital. She denies SI/HI. Denies auditory and visual hallucinations. Denies depression or anxiety. States she lives alone and her daughters help her out at home because they \"want to not that she needs it. \" She states she is sleeping \"OK\" but that her appetite is \"poor. \" Pt noted she currently feels safe and comfortable on the unit. Pt was in agreement with treatment team.  Pt was polite and cordial during the interview process.       Patient was transferred for less than 24 hours to medical floor for an episode of PAF. She was observed and then returned to the [facility]. Pt denied any hx of seizures, TBIs, Hep C or HIV  No TD noted, AIMS=0    Pt denied hx of previous inpt psychiatric admissions  Pt denied any previous suicide attempts  Pt denied any family hx of suicides  Pt denied any family mental health hx  Pt denied any hx of abuse trauma or neglect. Alcohol: denies any current  Street drugs: denies any current  Tobacco: none  Caffeine: 2 C tea per day    Past Psychiatric History:   See note above    Family Psychiatric History:   See note above    Family Psychiatric History:   History reviewed. No pertinent family history. Allergies:   Allergies   Allergen Reactions    Ibuprofen Other (See Comments)     Unknown     Pcn [Penicillins] OBJECTIVE  Vital Signs: There were no vitals filed for this visit.     Labs:  Recent Results (from the past 48 hour(s))   Basic metabolic panel    Collection Time: 07/18/20  6:30 PM   Result Value Ref Range    Sodium 142 135 - 145 MMOL/L    Potassium 3.9 3.5 - 5.1 MMOL/L    Chloride 102 99 - 110 mMol/L    CO2 19 (L) 21 - 32 MMOL/L    Anion Gap 21 (H) 4 - 16    BUN 25 (H) 6 - 23 MG/DL    CREATININE 0.8 0.6 - 1.1 MG/DL    Glucose 112 (H) 70 - 99 MG/DL    Calcium 10.3 8.3 - 10.6 MG/DL    GFR Non-African American >60 >60 mL/min/1.73m2    GFR African American >60 >60 mL/min/1.73m2   EKG 12 Lead    Collection Time: 07/19/20  9:51 AM   Result Value Ref Range    Ventricular Rate 83 BPM    Atrial Rate 83 BPM    P-R Interval 186 ms    QRS Duration 100 ms    Q-T Interval 410 ms    QTc Calculation (Bazett) 481 ms    P Axis 60 degrees    R Axis 2 degrees    T Axis 100 degrees    Diagnosis       Normal sinus rhythm  Nonspecific T wave abnormality  Prolonged QT  Abnormal ECG  When compared with ECG of 19-JUL-2020 09:49,  AR interval has decreased  Confirmed by JAC Berg ([id number]) on 7/19/2020 5:16:16 PM     Urinalysis    Collection Time: 07/19/20 12:00 PM   Result Value Ref Range    Color, UA YELLOW YELLOW    Clarity, UA CLOUDY CLEAR    Glucose, Urine NEGATIVE NEGATIVE MG/DL    Bilirubin Urine NEGATIVE NEGATIVE MG/DL    Ketones, Urine 15 (A) NEGATIVE MG/DL    Specific Gravity, UA 1.032 1.001 - 1.035    Blood, Urine SMALL NEGATIVE    pH, Urine 5.5 5.0 - 8.0    Protein, UA 30 (A) NEGATIVE MG/DL    Urobilinogen, Urine 0.2 0.2 - 1.0 MG/DL    Nitrite Urine, Quantitative NEGATIVE NEGATIVE    Leukocyte Esterase, Urine MODERATE NEGATIVE    RBC, UA 4 TO 8 0 - 6 /HPF    WBC, UA 40 TO 48 0 - 5 /HPF    Epithelial Cells, UA 3 TO 5 /HPF    Cast Type NEGATIVE (A) NO CAST FORMS SEEN /HPF    Bacteria, UA MANY NEGATIVE /HPF    Crystal Type NEGATIVE NEGATIVE /HPF   EKG 12 Lead    Collection Time: 07/19/20  3:28 PM   Result Value Ref Range Ventricular Rate 71 BPM    Atrial Rate 71 BPM    P-R Interval 174 ms    QRS Duration 98 ms    Q-T Interval 422 ms    QTc Calculation (Bazett) 458 ms    P Axis 65 degrees    R Axis -14 degrees    T Axis 88 degrees    Diagnosis       Normal sinus rhythm with sinus arrhythmia  Moderate voltage criteria for LVH, may be normal variant  Nonspecific T wave abnormality  Abnormal ECG  When compared with ECG of 19-JUL-2020 09:51,  No significant change was found  Confirmed by Valentino Silver, SAYED ([id number]) on 7/19/2020 5:16:23 PM         Review of Systems:  Reports of no current cardiovascular, respiratory, gastrointestinal, genitourinary, integumentary, neurological, musculoskeletal, or immunological symptoms today. PSYCHIATRIC: See HPI above. Neurologic examination    Mental status: The patient is alert, attentive, and oriented. Speech is clear and fluent with good repetition, comprehension, and naming. Cranial nerves:    CN II: Visual fields are full to confrontation. Pupils are 3 mm and briskly reactive to light. CN III, IV, VI: At primary gaze, there is no eye deviation. EOMs intact. CN V: Facial sensation is intact to pinprick in all 3 divisions bilaterally. Corneal responses are intact. CN VII: Face is symmetric with normal eye closure and smile. CN VII: Hearing decreased on the right but normal on the left to rubbing fingers    CN IX, X: Palate elevates symmetrically. Phonation is normal.    CN XI: Head turning and shoulder shrug are intact    CN XII: Tongue is midline with normal movements and no atrophy. Motor: There is no pronator drift of out-stretched arms. Muscle bulk and tone are normal. Strength is decreased in legs bilaterally. Reflexes:  Reflexes are 2+ and symmetric at the biceps, triceps, knees, and ankles. Plantar responses are flexor.     Sensory:  Light touch, pinprick, position sense, and vibration sense are intact in fingers    Coordination:  Rapid alternating movements and fine finger movements are intact. There is no dysmetria on finger-to-nose and heel-knee-shin. There are no abnormal or extraneous movements. Romberg is absent. Gait/Stance:  Posture is normal. Gait is unsteady with shuffling steps. Stride significantly diminished. PSYCHIATRIC EXAMINATION / MENTAL STATUS EXAM    CONSTITUTIONAL:    Vitals: There were no vitals filed for this visit. General appearance: [x] appears age, []  appears older than stated age,               [x]  adequately dressed and groomed, [] disheveled,               [x]  in no acute distress, [] appears mildly distressed, [] other           MUSCULOSKELETAL:   Gait:   [] normal, [] antalgic, [] unsteady, [] gait not evaluated   Station:             [] erect, [] sitting, [] recumbent, [] other        Strength/tone:  [x] muscle strength and tone appear consistent with age and                                        condition     [] atrophy      [] abnormal movements  PSYCHIATRIC:    Appearance: appears stated age. alert and oriented to person, place, time & situation. no acute distress. Adequate grooming and hygiene. Good eye contact. No prominent physical abnormalities. Attitude: Manner is cooperative and pleasant  Motor: No psychomotor agitation, retardation or abnormal movements noted  Speech: Clearly articulated; normal rate, volume, tone & amount. Language: intact understanding and production  Mood:  Affect: euthymic, full range, non-labile, congruent with mood and content of speech  Thought Production: Spontaneous. Thought Form: Coherent, linear, logical & goal-directed. No tangentiality or circumstantiality. No flight of ideas or loosening of associations. Thought Content/Perceptions: No SKYLER, no AVH, no delusion  Insight:  Judgment  Memory: Immediate, recent, and remote appear intact, though not formally tested.   Attention: maintained throughout interview  Fund of knowledge: Average  Gait/Balance: WNL/WNL    Impression:   Subcortical vascular dementia with behavioral disturbance    Problem List:   Subcortical vascular dementia with behavioral disturbance ([facility] [address]    Plan:  1. Admit to [place] [address] Unit  2. Consult Hospitalist to evaluate and treat medical conditions  3. Adjust psychotropic medications to target symptoms  4. Occupational Therapy, Physical Therapy, Group Psychotherapy as tolerated  5. Reviewed treatment plan with patient including medication risks, benefits, side effects. Obtained informed consent for treatment. 6. Anticipated length of stay 10-12 days  7. I certify that inpatient psychiatric hospital admission is medically necessary for treatment, which can be expected to improve the patient's condition, and/or for diagnostic study. 8. Psychiatric management:medication initiation and titration, group and individual therapy, safe and therapeutic environment. 9. Status of problem/condition: ?Improving  10. Medical co-morbidities: Management per [facility] group, appreciate assistance  11. Legal Status:Pending  12. The treatment team reviewed with the patient the diagnosis and treatment recommendations to include the risks, benefits, and side effects of chosen medications. 13. The patient verbalized understanding and agreed with the treatment regimen as outlined above. 14. The patient was encouraged to participate in groups. 15. Medical records, Labs, Diagnotic tests reviewed  16. q15 min safety checks for safety  17. Interval History. 18. Review current labs  19. Continue current medications  20. Supportive Therapy Provided  21. Pt had an opportunity to ask questions and address concerns  22. Pt encouraged to continue therapy group or individual.  23. Pt was in agreement with treatment plan. 24. The risks benefits and side effects of medications were discussed with the patient, including alternatives and no treatment.     Electronically signed by CELI Jones CNP on 7/20/2020 at 2:25 PM

## 2020-07-20 NOTE — CONSULTS
Department of Medicine Hospitalist Consult Note  Sada Pike M.D.       Reason for Consult: medical  management of  Chronic medical conditions  Requesting Physician:  Dr. Lm Stuart  PCP: Berna Lu MD     CHIEF COMPLAINT:  Chronic medical conditions  HISTORY OF PRESENT ILLNESS:   The patient is a 80 y.o. female with significant past medical history of  Dilated cardiomyopathy, paf not on oac, hypothyroidism, htn, MDS, dementia. She is being managed at [facility] for behavioral disturbance in setting of Dementia. She was seen this morning and denied any complaints but did not want to wake up because she was still tired. She was asked about chest pain or feeling sob or having palpitations, and denied all of the above. She was admitted under observation for brief episodes of elevated heart rate without hemodynamic disturbance. She has a history of being on amiodarone for tachydysrhythmia but was not given her medication and RRT was called on 7/19. Staff were advised to continue to give her this medication as well as her beta blocker medication. Her observation was without incident. Past Medical History:        Diagnosis Date    Dilated cardiomyopathy ([address]     Grade I diastolic dysfunction 95/65/1163    Hyperlipidemia     Hypertension     Hypothyroidism     Ischemic cardiomyopathy     Mild aortic insufficiency 05/14/2019    Mild left ventricular hypertrophy 05/14/2019    Mild mitral regurgitation 05/14/2019    Myelodysplastic disease ([address]     Non-rheumatic mitral regurgitation     PAF (paroxysmal atrial fibrillation) (HCC)     Subcortical vascular dementia with behavioral disturbance ([address] 07/10/2020     Past Surgical History:        Procedure Laterality Date    HEMIARTHROPLASTY HIP Left 5/14/2019    HIP HEMIARTHROPLASTY performed by Arnulfo Ruano MD at [facility] OR     Allergies:  Ibuprofen and Pcn [penicillins]  Social History:    TOBACCO:   reports that she has never smoked.  She has never used smokeless tobacco.  ETOH:   reports no history of alcohol use. DRUGS:   reports no history of drug use. MARITAL STATUS:    OCCUPATION:    Family History:   History reviewed. No pertinent family history. REVIEW OF SYSTEMS: a ten point ROS with pertinent positives and negatives in hpi, otherwise negative    PHYSICAL EXAM:    Vitals: There were no vitals filed for this visit. Intake/Output Summary (Last 24 hours) at 7/20/2020 1349  Last data filed at 7/20/2020 1231  Gross per 24 hour   Intake 0 ml   Output --   Net 0 ml       General appearance:  No apparent distress, appears stated age and not cooperative  HEENTNormal cephalic, atraumatic without obvious deformity. Pupils equal, round, and reactive to light. Extra ocular muscles intact. Conjunctivae/corneas clear. Neck: Supple, No jugular venous distention/bruits. Trachea midline without thyromegaly or adenopathy with full range of motion. Lungs: Clear to ascultation, bilaterally without Rales/Wheezes/Rhonchi with good respiratory effort. Heart: regular s1s2 at a rate of 65 bpm  Abdomen: Soft, non-tender or non-distended without rigidity or guarding and positive bowel sounds all four quadrants. Extremities: No clubbing, cyanosis, or edema bilaterally. Skin: Skin color, texture, turgor normal.  No rashes or lesions. Neurologic: does not follow direction      DATA:    CBC No results for input(s): WBC, HGB, HCT, PLT in the last 72 hours. BMP   Recent Labs     07/18/20  1830      K 3.9      CO2 19*   BUN 25*   CREATININE 0.8     LFT'S No results for input(s): AST, ALT, ALB, BILIDIR, BILITOT, ALKPHOS in the last 72 hours. COAG No results for input(s): INR in the last 72 hours. CARDIAC ENZYMES  No results for input(s): CKTOTAL, CKMB, CKMBINDEX, TROPONINI in the last 72 hours.   U/A:    Lab Results   Component Value Date    COLORU YELLOW 07/19/2020    WBCUA 40 TO 48 07/19/2020    RBCUA 4 TO 8 07/19/2020    MUCUS RARE 01/25/2016    BACTERIA MANY 07/19/2020    CLARITYU CLOUDY 07/19/2020    SPECGRAV 1.032 07/19/2020    LEUKOCYTESUR MODERATE 07/19/2020    BLOODU SMALL 07/19/2020    AMORPHOUS RARE 07/08/2020       CXR:  I have reviewed the CXR with the following interpretation:  EKG:  I have reviewed the EKG with the following interpretation:    IMPRESSION/RECOMMENDATIONS:     1. paf  - continue home amiodarone  - tele  - not candidate for [address]; continue asa  - dc back to [facility]  - continue coreg  - labs and ekg reviewed     2. Dementia with behavioral disturbance  - psych management  - recommend staff discuss code status with family     3. htn  - losartan, hold for systolic<110     4,. Weakness  - ptot  - no clear plan from [facility] sw about disposition     5. UTI  - is on antibiotic, stop date 7/24    6.  Severe protein calorie malnutrition  - dietary supplement  - encourage PO intake  - due to susceptibility to dehydration and nutritional deficiency recommend family consider TF/PEG if patient is to remain full code  - Marinol; monitor for SE of nausea/vomiting      Patient Active Problem List   Diagnosis    Closed displaced fracture of left femoral neck ([address]    Hypertension    CAD (coronary artery disease)    Myelodysplastic disease ([address]    Dementia, in, senility, with behavioral disturbance ([address]    Acute metabolic encephalopathy    Subcortical vascular dementia with behavioral disturbance ([address]    Hypothyroidism    Non-rheumatic mitral regurgitation    Hyperlipidemia    Grade I diastolic dysfunction    Mild left ventricular hypertrophy    Mild aortic insufficiency    Mild mitral regurgitation    Tachycardia       Oral Pain, M.D.

## 2020-07-20 NOTE — PROGRESS NOTES
Occupational Therapy   Occupational Therapy Initial Assessment  Date: 2020   Patient Name: Mookie Martinez  MRN: [id number]     : 10/2/1931    Date of Service: 2020    Discharge Recommendations:  Subacute/Skilled Nursing Facility       Assessment   Performance deficits / Impairments: Decreased functional mobility ; Decreased ADL status; Decreased strength;Decreased safe awareness;Decreased cognition;Decreased endurance;Decreased balance;Decreased high-level IADLs;Decreased fine motor control;Decreased vision/visual deficit; Decreased coordination  Assessment: 79 yo female admitted to the [facility] unit. She had been living independently at home but became agitated and tried to break windows with her cane. Pt worked with therapy on [facility] unit for walking and exercise. She was inconsistent, at times, unable to stand or walk but other days could walk with RW. Pt , was transfered over weekend to acute inpatient after rapid response and today was transferred back to [facility]. Today, she was able to walk with the rolling walker but had poor safety due to cognition. She was unable to do her adls due to poor cognition. OT to see pt to improve safety, I with adls, transfers, and cognition  Treatment Diagnosis: decreased I adls, transfers  Prognosis: Fair  Decision Making: Medium Complexity  History: see above  Exam: see above  OT Education: Transfer Training;Orientation  REQUIRES OT FOLLOW UP: Yes  Activity Tolerance  Activity Tolerance: Patient limited by fatigue;Treatment limited secondary to decreased cognition  Safety Devices  Safety Devices in place: Yes  Type of devices: Chair alarm in place;Nurse notified           Patient Diagnosis(es): There were no encounter diagnoses.      has a past medical history of Dilated cardiomyopathy ([facility] [address] Grade I diastolic dysfunction, Hyperlipidemia, Hypertension, Hypothyroidism, Ischemic cardiomyopathy, Mild aortic insufficiency, Mild left ventricular hypertrophy, Mild mitral regurgitation, Myelodysplastic disease ([facility] [address] Non-rheumatic mitral regurgitation, PAF (paroxysmal atrial fibrillation) ([facility] [address] and Subcortical vascular dementia with behavioral disturbance ([facility] [address] has a past surgical history that includes HEMIARTHROPLASTY HIP (Left, 5/14/2019). Treatment Diagnosis: decreased I adls, transfers      Restrictions  Restrictions/Precautions  Restrictions/Precautions: General Precautions, Fall Risk  Required Braces or Orthoses?: No  Position Activity Restriction  Other position/activity restrictions: fall risk; impaired cognition    Subjective   General  Chart Reviewed: Yes  Patient assessed for rehabilitation services?: Yes  Family / Caregiver Present: No  Subjective  Subjective: Pt asked if PT could stand behind her so she wouldn't fall  Patient Currently in Pain: Yes  Pain Assessment  Pain Assessment: 0-10  Pain Level: (reports \" a lot of pain\" but will not report number)  Pain Location: Back  Response to Pain Intervention: Confused  Vital Signs  Patient Currently in Pain: Yes  Social/Functional History  Social/Functional History  Lives With: (alone)  Type of Home: House  Home Layout: One level  Home Access: Stairs to enter with rails  Bathroom Shower/Tub: Tub/Shower unit  Bathroom Toilet: Standard  Bathroom Equipment: Shower chair  Home Equipment: [address] Help From: Family  ADL Assistance: Independent(Per history but during hospital stay has aquired assist)  Homemaking Assistance: (Pt stated 5 days ago that she was I; poor historian)  Homemaking Responsibilities: No  Ambulation Assistance: Independent  Transfer Assistance: Independent  Active : No  Education: [employer]  Occupation: Retired  Leisure & Hobbies: Cooking  Additional Comments: P is poor historian. Information obtained from chart.        Objective   Vision Exceptions: Wears glasses at all times  Hearing: Within functional limits    Orientation  Overall Orientation Status: Impaired  Orientation Level: Oriented to person;Disoriented to place; Disoriented to situation;Disoriented to time  Observation/Palpation  Observation: P walking with nurse's tech  Balance  Sitting Balance: Independent  Standing Balance: Contact guard assistance  Functional Mobility  Functional - Mobility Device: Rolling Walker  Assist Level: Minimal assistance  Functional Mobility Comments: SBA + vcs for walker safety  ADL  Feeding: Setup  UE Bathing: (would not simulate)  UE Dressing:  Moderate assistance  LE Dressing: Dependent/Total(socks)  Tone RUE  RUE Tone: Normotonic  Tone LUE  LUE Tone: Normotonic  Coordination  Movements Are Fluid And Coordinated: Yes        Transfers  Sit to stand: Contact guard assistance  Stand to sit: Contact guard assistance     Cognition  Following Commands: Inconsistently follows commands  Attention Span: Attends with cues to redirect  Memory: Decreased recall of biographical Information;Decreased recall of recent events;Decreased short term memory  Safety Judgement: Decreased awareness of need for assistance  Problem Solving: Assistance required to correct errors made;Assistance required to identify errors made;Assistance required to generate solutions;Assistance required to implement solutions;Decreased awareness of errors  Insights: Not aware of deficits  Initiation: Requires cues for some  Sequencing: Requires cues for some        Sensation  Overall Sensation Status: WFL(per p report)        LUE AROM (degrees)  LUE AROM : WFL  RUE AROM (degrees)  RUE AROM : WFL  LUE Strength  LUE Strength Comment: general weakness  RUE Strength  RUE Strength Comment: general weakness                   Plan   Plan  Times per week: 2+  Current Treatment Recommendations: Strengthening, ROM, Balance Training, Functional Mobility Training, Endurance Training, Safety Education & Training, Self-Care / ADL, Patient/Caregiver Education & Training, Cognitive/Perceptual Training    G-Code     OutComes Score

## 2020-07-20 NOTE — DISCHARGE SUMMARY
Lucy Albert 10/2/1931 [id number]  PCP:  Agustín Monterroso MD    Admit date: 7/19/2020  Admitting Physician: Aurelio Pereira MD    Discharge date: 7/20/2020 Discharge Physician: Aurelio Pereira MD      Reason for admission: No chief complaint on file. Present on Admission:   Tachycardia       Discharge Diagnoses Include:  1. paf  - continue home amiodarone  - tele  - not candidate for [address]; continue asa  - dc back to [facility]  - continue coreg  - labs and ekg reviewed     2. Dementia with behavioral disturbance  - psych is consulted     3. htn  - losartan, hold for systolic<110     4,. Weakness  - ptot  - no clear plan from [facility] sw about disposition    5. UTI  - is on antibiotic, stop date 7/24    Hospital Course[de-identified]   Patient presented after brief episode of tachy-aarythmia because of not receiving medication in the morning. She was not secondarily symptomatic     Pt was personally examined by me on the day of discharge with the following findings:   Vitals:    07/19/20 1930   BP: 138/80   Pulse:    Resp:    Temp: 97 °F (36.1 °C)   SpO2:      Gen: alert, perseverating behavior  Heent: ncat  Lungs: ctabl  Car: iirregular rhythm  Abd: soft nt nd  Ext: rom wnl, proximal muscle weakness  Skin: warm+dry  Neuro: does not follow direction    Patient Instructions:   Ganesh Alvarado   Home Medication Instructions BLAIRE:[id number]    Printed on:07/20/20 0725   Medication Information                      amiodarone (CORDARONE) 200 MG tablet  Take 1 tablet by mouth daily             aspirin 81 MG tablet  Take 81 mg by mouth daily.              carvedilol (COREG) 25 MG tablet  Take 12.5 mg by mouth 2 times daily (with meals)              diphenhydrAMINE (BENADRYL) 50 MG/ML injection  Inject 25 mg into the muscle every 6 hours as needed             docusate sodium (COLACE, DULCOLAX) 100 MG CAPS  Take 100 mg by mouth 2 times daily             haloperidol (HALDOL) 0.5 MG tablet  Take 0.5 mg by mouth 4 times daily             levothyroxine (SYNTHROID) 75 MCG tablet  Take 75 mcg by mouth Daily. losartan (COZAAR) 50 MG tablet  Take 50 mg by mouth daily             MAGNESIUM-ZINC PO  Take 1 tablet by mouth daily. QUEtiapine (SEROQUEL) 25 MG tablet  Take 50 mg by mouth nightly             vitamin B-12 (CYANOCOBALAMIN) 1000 MCG tablet  Take 1,000 mcg by mouth daily                  Code Status: Full Code     Consults: psychiatry  IP CONSULT TO HOSPITALIST  IP CONSULT TO HOME CARE NEEDS  IP CONSULT TO PSYCHIATRY  IP CONSULT TO HOSPITALIST  IP CONSULT TO HOME CARE NEEDS  IP CONSULT TO PSYCHIATRY    Diet: cardiac diet    Activity: activity as tolerated   Work:    Discharged Condition: fair    Prognosis: Fair    Disposition: [facility]    Follow-up with   1. PCP within   5-7    Days         Discharge Physician Signed: Idris Patten M.D. The patient was seen and examined on day of discharge and this discharge summary is in conjunction with any daily progress note from day of discharge.   Time spent on discharge in the examination, evaluation, counseling and review of medications and discharge plan: 22 minutes

## 2020-07-20 NOTE — CARE COORDINATION
ELLIEW called Francisca Montez ([phone]) with patient's insurance to inform of patient's medical discharge to observation status. Discharge summary faxed to [phone]. Miah Meadows also informed that a new authorization will need to be done when patient comes back later today.

## 2020-07-20 NOTE — PROGRESS NOTES
Physical Therapy    [facility]ility[facility] [facility]  Initial Assessment    NAME: Mookie Martinez  : 10/2/1931  MRN: [id number]    Date of Service: 2020    Discharge Recommendations:  24 hour supervision or assist   PT Equipment Recommendations  Equipment Needed: No    Assessment   Body structures, Functions, Activity limitations: Decreased functional mobility ; Decreased high-level IADLs;Decreased endurance;Decreased strength;Decreased balance;Decreased safe awareness;Decreased cognition; Increased pain  Assessment: P is 79 yo female who presents with significant deficits in strength, balance, gait and overall reduced safety with mobility. Recommend skilled PT to address deficits and maximize functional potential.  Treatment Diagnosis: impaired mobility, strength, gait, balance  Prognosis: Good  Decision Making: Medium Complexity  History: see below  Exam: strength/ gait  Clinical Presentation: evolving  Barriers to Learning: impaired cognition  REQUIRES PT FOLLOW UP: Yes  Activity Tolerance  Activity Tolerance: Patient Tolerated treatment well;Patient limited by cognitive status       Patient Diagnosis(es): There were no encounter diagnoses. has a past medical history of Dilated cardiomyopathy ([address] Grade I diastolic dysfunction, Hyperlipidemia, Hypertension, Hypothyroidism, Ischemic cardiomyopathy, Mild aortic insufficiency, Mild left ventricular hypertrophy, Mild mitral regurgitation, Myelodysplastic disease ([address] Non-rheumatic mitral regurgitation, PAF (paroxysmal atrial fibrillation) ([address] and Subcortical vascular dementia with behavioral disturbance ([address] has a past surgical history that includes HEMIARTHROPLASTY HIP (Left, 2019).     Restrictions  Restrictions/Precautions  Restrictions/Precautions: General Precautions, Fall Risk  Required Braces or Orthoses?: No  Position Activity Restriction  Other position/activity restrictions: fall risk; impaired cognition  Vision/Hearing  Vision: Impaired  Vision Exceptions: Wears glasses at all times  Hearing: Within functional limits     Subjective  General  Chart Reviewed: Yes  Patient assessed for rehabilitation services?: Yes  Family / Caregiver Present: No  Follows Commands: Impaired  Other (Comment): requires repetition  Subjective  Subjective: \"Will you stand behind me so I don't fall? \"  Pain Screening  Patient Currently in Pain: Yes  Pain Assessment  Pain Assessment: 0-10  Pain Level: (reports \" a lot of pain\" but will not report number)  Pain Location: Back  Vital Signs  Patient Currently in Pain: Yes       Orientation  Orientation  Overall Orientation Status: Impaired  Orientation Level: Disoriented to time;Disoriented to place;Oriented to situation  Social/Functional History  Social/Functional History  Lives With: (alone)  Type of Home: House  Home Layout: One level  Home Access: Stairs to enter with rails  Bathroom Shower/Tub: Tub/Shower unit  Bathroom Toilet: Standard  Bathroom Equipment: Shower chair  Home Equipment: [address] Help From: Family  ADL Assistance: [address]: (Pt stated 5 days ago that she was I; poor historian)  Homemaking Responsibilities: No  Ambulation Assistance: Independent  Transfer Assistance: Independent  Active : No  Education: [employer]  Occupation: Retired  Leisure & Hobbies: Cooking  Additional Comments: P is poor historian. Information obtained from chart.   Cognition   Cognition  Following Commands: Inconsistently follows commands  Attention Span: Attends with cues to redirect  Memory: Decreased recall of biographical Information;Decreased recall of recent events;Decreased short term memory  Safety Judgement: Decreased awareness of need for assistance  Insights: Not aware of deficits  Initiation: Requires cues for some  Sequencing: Requires cues for some    Objective     Observation/Palpation  Observation: P walking with nurse's tech    PROM RLE (degrees)  RLE General PROM: knee FLEX to ~ 110* otherwise WFL  AROM RLE (degrees)  RLE AROM: WFL  PROM LLE (degrees)  LLE General PROM: knee FLEX to ~ 110* otherwise WFL  AROM LLE (degrees)  LLE AROM : WFL  Strength RLE  Strength RLE: Exception  Comment: P with difficulty commands at least 3/5  R Hip Flexion: 2+/5  R Knee Extension: 3/5  R Ankle Dorsiflexion: 3/5  Strength LLE  Strength LLE: Exception  Comment: P with difficulty commands at least 3/5  L Hip Flexion: 2+/5  L Knee Extension: 3/5  L Ankle Dorsiflexion: 3/5  Tone RLE  RLE Tone: Not tested  Tone LLE  LLE Tone: Not tested  Motor Control  Gross Motor?: WFL  Sensation  Overall Sensation Status: WFL(per p report)  Bed mobility  Comment: Not assessed p seen out of bed  Transfers  Stand to sit: Contact guard assistance  Comment: P with decreased safety with rolling walker despite max cues  Ambulation  Ambulation?: Yes  WB Status: full weight bearing  More Ambulation?: No  Ambulation 1  Surface: level tile  Device: Rolling Walker  Assistance: Supervision  Quality of Gait: decreased bassam, MARY knee flexion, forward trunk  Gait Deviations: Shuffles  Distance: 40'  Stairs/Curb  Stairs?: No     Balance  Posture: Fair  Sitting - Static: Good  Sitting - Dynamic: Good  Standing - Static: Fair  Standing - Dynamic: [address]  Times per week: 3 (mon-sat)  Plan weeks: 1 week or until discharge  Current Treatment Recommendations: Strengthening, Transfer Training, Endurance Training, ADL/Self-care Training, Neuromuscular Re-education, IADL Training, Gait Training, Equipment Evaluation, Education, & procurement, Positioning, Functional Mobility Training  Safety Devices  Type of devices: Chair alarm in place, Nurse notified, Left in chair      AM-PAC Score  AM-PAC Inpatient Mobility Raw Score : 17 (07/20/20 1530)  AM-PAC Inpatient T-Scale Score : 42.13 (07/20/20 1530)  Mobility Inpatient CMS 0-100% Score: 50.57 (07/20/20 1530)  Mobility Inpatient CMS G-Code Modifier : CK (07/20/20 1530)          Goals  Short term goals  Time Frame for Short term goals: 1 week or until discharge  Short term goal 1: P will be supervision with bed mobility  Short term goal 2: P will perform transfers with supervision to [address] term goal 3: P will ambulate 150' with LRAD and supervision       Therapy Time   Individual Concurrent Group Co-treatment   Time In       1500   Time Out       1510   Minutes       10           Ronen Lester PT    Electronically signed by Ronen Lester PT on 7/20/2020 at 3:30 PM

## 2020-07-20 NOTE — GROUP NOTE
Group Therapy Note    Date: 7/20/2020    Group Start Time: 1030  Group End Time: 1120  Group Topic: Psychoeducation    [address]    KARIN Oconnor        Group Therapy Note    Attendees: 5/6       Notes:  Pts participated in recreational therapy group; Getting To Know You. Pts and therapist utilized various activities to reminisce about their past and share stories. Purpose was to encourage positive thinking and socialization. Pt attended group, but did not participate. Pt did not respond to prompting/encouragement to participate in group. Pt noted to require Max prompting to not grab peers.      Status After Intervention:  Unchanged    Participation Level: None    Participation Quality: Inappropriate      Speech:  hesitant      Thought Process/Content: Confused      Affective Functioning: Flat      Mood: Flat      Level of consciousness:  Preoccupied and Inattentive      Response to Learning: Resistant      Endings: None Reported    Modes of Intervention: Support, Socialization, Exploration and Activity      Discipline Responsible: Certified Therapeutic Recreation Specialist       Signature:  Mishel Villatoro

## 2020-07-20 NOTE — CARE COORDINATION
ELLIEW received call from patient's daughter Orville Marte. [place] wanted update to patient's discharge plan. LSW informed that patient plan is to discharge home tomorrow with [facility]. [place] asked if they would be able to send patient to a SNF when she gets home if they are unable to care for her. LSW informed that that would require going through their family doctor. [place] asked if patient should go to a SNF and LSW informed that the staff cannot make that decision, but she was skilled by PT and OT. [place] asked why she was not informed of this when patient was here. ELLIEW informed that previous conversations were that family wanted patient to return home. [place] verbalized this was the case, but they visited patient yesterday on inpatient and do not know if they can care for her in her current state as she is not walking and is a heavy 2 assist.  ELLIEW stated that I could not speak to the 2 assist, but that I had seen patient walking with her walker. [place] verbalized that that is not how it seemed yesterday and wanted to know why they were not kept more informed of patient's condition. [place] verbalized they were upset and wanted to know what exactly was the point in patient coming here if she is just being sent home to fail. After 40 minutes of explaining this was not the case, call was forwarded to unit manager Aditya Fuller to discuss concerns. After call, Aditya Fuller informed LSW that patient is discharging home tomorrow with [facility].    4:00 PM  Clinicals submitted for patient pre-auth to [facility] at [phone]. ELLIEW awaits determination.

## 2020-07-20 NOTE — PROGRESS NOTES
Pt arrived on unit at 1015     Items placed in locker 1052:  [address]   Pair of shoes     Items put in pt room:  Jeans 2x   turquoise shirt   Blue [employer] and white karly jimenes

## 2020-07-20 NOTE — GROUP NOTE
Group Therapy Note    Date: 7/20/2020    Group Start Time: 1300  Group End Time: 1330    Number of Participants: 3/6    Type: Exercise/Recreation Group    Group Topic/Objective: Chair Exercises     Notes:  Pt not appropriate for group at this time d/t confusion and allowed to remain at her table.      Discipline Responsible: Certified Therapeutic Recreation Specialist     Electronically signed by Gali Hart [place], [place] on 7/20/2020 at 1:37 PM

## 2020-07-21 VITALS
HEART RATE: 93 BPM | DIASTOLIC BLOOD PRESSURE: 58 MMHG | TEMPERATURE: 97.5 F | OXYGEN SATURATION: 97 % | SYSTOLIC BLOOD PRESSURE: 91 MMHG | RESPIRATION RATE: 16 BRPM

## 2020-07-21 LAB
CULTURE: NORMAL
EKG ATRIAL RATE: 71 BPM
EKG DIAGNOSIS: NORMAL
EKG P AXIS: 65 DEGREES
EKG P-R INTERVAL: 174 MS
EKG Q-T INTERVAL: 422 MS
EKG QRS DURATION: 98 MS
EKG QTC CALCULATION (BAZETT): 458 MS
EKG R AXIS: -14 DEGREES
EKG T AXIS: 88 DEGREES
EKG VENTRICULAR RATE: 71 BPM
Lab: NORMAL
SPECIMEN: NORMAL

## 2020-07-21 PROCEDURE — 97530 THERAPEUTIC ACTIVITIES: CPT

## 2020-07-21 PROCEDURE — 99233 SBSQ HOSP IP/OBS HIGH 50: CPT | Performed by: NURSE PRACTITIONER

## 2020-07-21 PROCEDURE — 97535 SELF CARE MNGMENT TRAINING: CPT

## 2020-07-21 PROCEDURE — 6370000000 HC RX 637 (ALT 250 FOR IP): Performed by: INTERNAL MEDICINE

## 2020-07-21 RX ORDER — TRAZODONE HYDROCHLORIDE 50 MG/1
50 TABLET ORAL NIGHTLY PRN
Qty: 30 TABLET | Refills: 2 | Status: SHIPPED | OUTPATIENT
Start: 2020-07-21

## 2020-07-21 RX ORDER — POLYETHYLENE GLYCOL 3350 17 G/17G
17 POWDER, FOR SOLUTION ORAL DAILY PRN
Qty: 527 G | Refills: 1 | Status: SHIPPED | OUTPATIENT
Start: 2020-07-21 | End: 2020-08-20

## 2020-07-21 RX ORDER — DRONABINOL 2.5 MG/1
2.5 CAPSULE ORAL 2 TIMES DAILY WITH MEALS
Qty: 60 CAPSULE | Refills: 2 | Status: SHIPPED | OUTPATIENT
Start: 2020-07-21 | End: 2020-07-21

## 2020-07-21 RX ORDER — MIRTAZAPINE 15 MG/1
15 TABLET, ORALLY DISINTEGRATING ORAL NIGHTLY
Qty: 30 TABLET | Refills: 2 | Status: SHIPPED | OUTPATIENT
Start: 2020-07-21

## 2020-07-21 RX ORDER — CEFDINIR 300 MG/1
300 CAPSULE ORAL EVERY 12 HOURS SCHEDULED
Qty: 20 CAPSULE | Refills: 0 | Status: SHIPPED | OUTPATIENT
Start: 2020-07-21 | End: 2020-07-31

## 2020-07-21 RX ORDER — LANOLIN ALCOHOL/MO/W.PET/CERES
200 CREAM (GRAM) TOPICAL DAILY
Qty: 30 TABLET | Refills: 2 | Status: SHIPPED | OUTPATIENT
Start: 2020-07-22

## 2020-07-21 RX ORDER — DRONABINOL 2.5 MG/1
2.5 CAPSULE ORAL 2 TIMES DAILY WITH MEALS
Qty: 60 CAPSULE | Refills: 2 | Status: SHIPPED | OUTPATIENT
Start: 2020-07-21 | End: 2020-08-20

## 2020-07-21 RX ORDER — DONEPEZIL HYDROCHLORIDE 5 MG/1
5 TABLET, FILM COATED ORAL NIGHTLY
Qty: 30 TABLET | Refills: 2 | Status: SHIPPED | OUTPATIENT
Start: 2020-07-21

## 2020-07-21 RX ADMIN — CARVEDILOL 12.5 MG: 12.5 TABLET, FILM COATED ORAL at 07:49

## 2020-07-21 RX ADMIN — DOCUSATE SODIUM 100 MG: 50 LIQUID ORAL at 07:50

## 2020-07-21 RX ADMIN — LEVOTHYROXINE SODIUM 75 MCG: 0.07 TABLET ORAL at 07:49

## 2020-07-21 RX ADMIN — Medication 200 MG: at 07:50

## 2020-07-21 RX ADMIN — LOSARTAN POTASSIUM 50 MG: 50 TABLET ORAL at 07:50

## 2020-07-21 RX ADMIN — CYANOCOBALAMIN TAB 1000 MCG 1000 MCG: 1000 TAB at 07:50

## 2020-07-21 RX ADMIN — AMIODARONE HYDROCHLORIDE 200 MG: 200 TABLET ORAL at 07:49

## 2020-07-21 RX ADMIN — CEFDINIR 300 MG: 300 CAPSULE ORAL at 07:50

## 2020-07-21 RX ADMIN — DRONABINOL 2.5 MG: 2.5 CAPSULE ORAL at 12:20

## 2020-07-21 RX ADMIN — ASPIRIN 81 MG 81 MG: 81 TABLET ORAL at 07:49

## 2020-07-21 NOTE — PROGRESS NOTES
Discussed discharge instructions and medications with daughter David Sen. Pt discharged at this time via private car.

## 2020-07-21 NOTE — DISCHARGE SUMMARY
Department of Psychiatry    Discharge Summary    Kenan Gamboa  [id number]    Admission date:   7/20/2020    Discharge:   Date: 07/21/20  Location: [place] [address] Provider: Kayla Minaya D.O. 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 Patient was able to provide informed consent and outline the risks and benefits of medications. Kenan Gamboa was  compliant with medications. Pt noted a significant reduction in her depression and anxiety during her  stay on [facility]. Pt noted that she slowly improved to the point that she   was comfortable and safe to return home. Pt noted she felt her medications were  working well and denied any current side effects. Treatment team encouraged  patient to stay out of bed and try to find activities to do, verbalized  understanding. Patient reported that she felt safe on the unit and comfortable  for discharge. Pt denied suicidal/homicidal ideations, denied any problems  or concerns with medications or side effects. patient voiced progression towards  treatment goals and was offered a copy of updated treatment plan completed  during visit today. Denied any immediate needs or concerns. Pt throughout her stay on [facility] pt felt like her medications were working and  felt comfortable being discharged on these medications.   Pt was advised to take  all medications as prescribed, follow up with all scheduled appointments and  abstain from any alcohol or illicit substances. Pt was in agreement. Pt felt  safe and comfortable to be discharge and to follow up with outpt mental health. Pt was very optimistic   about her D/C and returning to her home. Pt stated that she   was doing \"good,\" today. Pt stated that she slept \"about 4 hours,\" last night. Pt stated that her appetite is \"improving. \"  Pt unable to rate her depression and anxiety on a numeric scale but appears to have improved over her stay on the unit. Pt denies any auditory  or visual hallucinations. Pt denied any thoughts to harm herself or anyone else. Pt felt safe and comfortable for D/C. The Pt was educated primarily by verbal means about her diagnoses and their  manifestations in her life. The option for treatment including group individual  therapy programming was offered to her and the use of medications with all their  potential risks, benefits, and side-effects were discussed with the pt at  length. Pt was given the opportunity to ask questions and she participated in the  treatment and planning process. Pt felt ready and eager to be discharged from  the from the [facility] unit. Pt felt she was safe for this disposition. Pt was considered to be able to  participate in informed consent and decision-making with respect to medical,  legal and financial issues at the time of her discharge from the [facility]. Complications:Patient was admitted to [facility] due to high Heart Rate and then returned to [facility] the next day. Treatment options, medications and alternatives reviewed with Mamadou Delgadillo and they agree with the plan to discharge. Discharge on regular dental soft diet, continue activity as tolerated. Patient appears to be in stable condition and close to their baseline functioning. The patient denies suicidal or homicidal ideations and is showing future orientation. Patient no longer presented an imminent risk of danger to themselves and/or others.  At the time of discharge it appears that the patient has received the maximum medical benefit from this hospitalization and can be appropriately managed with community treatment. Medication List      ASK your doctor about these medications    amiodarone 200 MG tablet  Commonly known as:  CORDARONE  Take 1 tablet by mouth daily     aspirin 81 MG tablet     carvedilol 12.5 MG tablet  Commonly known as:  COREG     diphenhydrAMINE 50 MG/ML injection  Commonly known as:  BENADRYL     docusate 100 MG Caps  Commonly known as:  COLACE, DULCOLAX  Take 100 mg by mouth 2 times daily     haloperidol 0.5 MG tablet  Commonly known as:  HALDOL     levothyroxine 75 MCG tablet  Commonly known as:  SYNTHROID     losartan 50 MG tablet  Commonly known as:  COZAAR     MAGNESIUM-ZINC PO     QUEtiapine 100 MG tablet  Commonly known as:  SEROQUEL     vitamin B-12 1000 MCG tablet  Commonly known as:  CYANOCOBALAMIN            Social History     Socioeconomic History    Marital status:       Spouse name: Not on file    Number of children: Not on file    Years of education: Not on file    Highest education level: Not on file   Occupational History    Not on file   Social Needs    Financial resource strain: Not on file    Food insecurity     Worry: Not on file     Inability: Not on file    Transportation needs     Medical: Not on file     Non-medical: Not on file   Tobacco Use    Smoking status: Never Smoker    Smokeless tobacco: Never Used   Substance and Sexual Activity    Alcohol use: No    Drug use: No    Sexual activity: Not Currently   Lifestyle    Physical activity     Days per week: Not on file     Minutes per session: Not on file    Stress: Not on file   Relationships    Social connections     Talks on phone: Not on file     Gets together: Not on file     Attends Judaism service: Not on file     Active member of club or organization: Not on file     Attends meetings of clubs or organizations: Not on file     Relationship status: mouth nightly   MAGNESIUM-ZINC PO, Take 1 tablet by mouth daily. Allergies   Allergen Reactions    Ibuprofen Other (See Comments)     Unknown     Pcn [Penicillins]         he patient verbalized understanding and agreement with the above information  LEGAL STATUS ON DISCHARGE:  Voluntary    DISCHARGE DISPOSITION:  Home with [address], walker to her daughters    DISCHARGE CONDITION:  Stable for outpatient treatment  DISCHARGE DIET:  Resume previous home diet    ACTI VITES:  As tolerated    FOLLOWUP APPOINTMENT(S):  Per aftercare summary  CONSULTS:  [facility] POA was offered and reviewed with pt:      Talked to pts poa,went over patients benefits and other matters. Reveiwed financial options /programs ect. .. CORE MEASURES  -Was the patient discharged on two or more Antipsychotics? No    -If multiple Antipsychotic medications prescribed,is tapering recommended? n/a    ? If yes, document plan:  ?  -If multiple Antipsychotic medications prescribed at discharge, is cross tapering in process at D/C? Na    -Have there been 3 or more failed attempts of mono therapy? ?na    -If yes, list at least 3 of the failed Antipsychotic medications with the reason why each one failed: ?NA    -Was Hemoglobin A1C or fasting Glucose measure done within the last 12 months? Yes    -Lipid Panel measure done within the last 12 months?yes    -Pt was offered an FDA approved medication for Chemical Dependency: (offered refused/ordered) na    -Pt was offered for discharged on tobacco/nicotine cessation and/or codependency cessation via medication assistance: (offered refused/ordered) na    More than 30 mins was spent face to face with the patient to discuss the diagnosis, treatment recommendations, and prognosis. Safety planning was also reviewed.  The patient agreed to go to the nearest ER or call 911 if they experienced an emergency        The patient was admitted to the psychiatric unit and monitored for stabilization. A multidisciplinary team met with the patient on a daily basis. The diagnosis, treatment recommendations, and prognosis were reviewed with the patient.       Objective:  Vital signs in last 24 hours:  Vitals:    07/21/20 0745   BP: (!) 91/58   Pulse: 93   Resp: 16   Temp: 97.5 °F (36.4 °C)   SpO2: 97%       Labs:      Recent Results (from the past 504 hour(s))   EKG 12 Lead    Collection Time: 07/08/20  4:27 PM   Result Value Ref Range    Ventricular Rate 70 BPM    Atrial Rate 70 BPM    P-R Interval 186 ms    QRS Duration 106 ms    Q-T Interval 404 ms    QTc Calculation (Bazett) 436 ms    P Axis 47 degrees    R Axis 32 degrees    T Axis 43 degrees    Diagnosis       Normal sinus rhythm  Normal ECG  No previous ECGs available  Confirmed by [facility] MARLENE ROMO ([id number]) on 7/9/2020 1:09:58 PM     CBC auto diff    Collection Time: 07/08/20  4:38 PM   Result Value Ref Range    WBC 5.8 4.0 - 10.5 K/CU MM    RBC 3.76 (L) 4.2 - 5.4 M/CU MM    Hemoglobin 10.8 (L) 12.5 - 16.0 GM/DL    Hematocrit 34.9 (L) 37 - 47 %    MCV 92.8 78 - 100 FL    MCH 28.7 27 - 31 PG    MCHC 30.9 (L) 32.0 - 36.0 %    RDW 15.2 (H) 11.7 - 14.9 %    Platelets 079 717 - 420 K/CU MM    MPV 9.0 7.5 - 11.1 FL    Differential Type AUTOMATED DIFFERENTIAL     Segs Relative 68.1 (H) 36 - 66 %    Lymphocytes % 16.3 (L) 24 - 44 %    Monocytes % 11.3 (H) 0 - 4 %    Eosinophils % 2.8 0 - 3 %    Basophils % 1.2 (H) 0 - 1 %    Segs Absolute 3.9 K/CU MM    Lymphocytes Absolute 0.9 K/CU MM    Monocytes Absolute 0.7 K/CU MM    Eosinophils Absolute 0.2 K/CU MM    Basophils Absolute 0.1 K/CU MM    Nucleated RBC % 0.0 %    Total Nucleated RBC 0.0 K/CU MM    Total Immature Neutrophil 0.02 K/CU MM    Immature Neutrophil % 0.3 0 - 0.43 %   BMP    Collection Time: 07/08/20  4:38 PM   Result Value Ref Range    Sodium 138 135 - 145 MMOL/L    Potassium 4.3 3.5 - 5.1 MMOL/L    Chloride 102 99 - 110 mMol/L    CO2 26 21 - 32 MMOL/L    Anion Gap 10 4 - 16    BUN 29 (H) 6 P-R Interval 178 ms    QRS Duration 104 ms    Q-T Interval 432 ms    QTc Calculation (Bazett) 452 ms    P Axis 50 degrees    R Axis -17 degrees    T Axis 47 degrees    Diagnosis       Normal sinus rhythm  Voltage criteria for left ventricular hypertrophy  Abnormal ECG  When compared with ECG of 08-JUL-2020 16:27,  No significant change was found  Confirmed by JAC Taylor ([id number]) on 7/18/2020 4:45:38 PM     Basic metabolic panel    Collection Time: 07/18/20  6:30 PM   Result Value Ref Range    Sodium 142 135 - 145 MMOL/L    Potassium 3.9 3.5 - 5.1 MMOL/L    Chloride 102 99 - 110 mMol/L    CO2 19 (L) 21 - 32 MMOL/L    Anion Gap 21 (H) 4 - 16    BUN 25 (H) 6 - 23 MG/DL    CREATININE 0.8 0.6 - 1.1 MG/DL    Glucose 112 (H) 70 - 99 MG/DL    Calcium 10.3 8.3 - 10.6 MG/DL    GFR Non-African American >60 >60 mL/min/1.73m2    GFR African American >60 >60 mL/min/1.73m2   EKG 12 Lead    Collection Time: 07/19/20  9:51 AM   Result Value Ref Range    Ventricular Rate 83 BPM    Atrial Rate 83 BPM    P-R Interval 186 ms    QRS Duration 100 ms    Q-T Interval 410 ms    QTc Calculation (Bazett) 481 ms    P Axis 60 degrees    R Axis 2 degrees    T Axis 100 degrees    Diagnosis       Normal sinus rhythm  Nonspecific T wave abnormality  Prolonged QT  Abnormal ECG  When compared with ECG of 19-JUL-2020 09:49,  IA interval has decreased  Confirmed by JAC Taylor ([id number]) on 7/19/2020 5:16:16 PM     Urinalysis    Collection Time: 07/19/20 12:00 PM   Result Value Ref Range    Color, UA YELLOW YELLOW    Clarity, UA CLOUDY CLEAR    Glucose, Urine NEGATIVE NEGATIVE MG/DL    Bilirubin Urine NEGATIVE NEGATIVE MG/DL    Ketones, Urine 15 (A) NEGATIVE MG/DL    Specific Gravity, UA 1.032 1.001 - 1.035    Blood, Urine SMALL NEGATIVE    pH, Urine 5.5 5.0 - 8.0    Protein, UA 30 (A) NEGATIVE MG/DL    Urobilinogen, Urine 0.2 0.2 - 1.0 MG/DL    Nitrite Urine, Quantitative NEGATIVE NEGATIVE    Leukocyte Esterase, Urine MODERATE NEGATIVE

## 2020-07-21 NOTE — GROUP NOTE
Group Therapy Note    Date: 7/21/2020    Group Start Time: 1030  Group End Time: 0185  Group Topic: Psychoeducation    [address], [place], [place]        Group Therapy Note    Attendees: 6/6       Notes:  Pts participated in recreational therapy group; Picture Bingo. Pts were paired into teams of 2 and asked to look through the magazines to find pictures r/t various leisure activities on the bingo board provided. Pts were asked to create a bingo utilizing the pictures glued to the paper. Discussed centered around how leisure makes them feel and benefits to leisure participation. Pt attended group, but did not participate. Pt did not respond to prompting/encouragement. Pt was noted to be able to sit calmly in group with no disruptions for majority of group.      Status After Intervention:  Improved    Participation Level: Minimal    Participation Quality: Resistant      Speech:  normal      Thought Process/Content: Logical      Affective Functioning: Blunted      Mood: Blunted      Level of consciousness:  Inattentive      Response to Learning: Resistant      Endings: None Reported    Modes of Intervention: Education, Support, Socialization and Activity      Discipline Responsible: Certified Therapeutic Recreation Specialist       Signature:  Mishel Ayala

## 2020-07-21 NOTE — PROGRESS NOTES
Psychiatric Progress Note    Tommy Bautista  [id number]  07/21/20    CHIEF COMPLAINT: \"I don't know. \"    HPI: Tommy Bautista is an 80year old  female with PMH of dementia, depression, HTN, Hypothyroidism, CAD and myelodysplastic disease. Patient presented to [address] [place] [address] ED after becoming aggressive at home. Per ED notes patient attempted to break windows at home with her cane, threatened suicide and homicide. Psychiatry was consulted by Filemon Machado CNP for suicide and homicide attempt.     During today's interview the patient is alert and oriented to self only. She is not aware of why she is in the hospital. She denies SI/HI. Denies auditory and visual hallucinations. Denies depression or anxiety. States she is slept \"OK\" Notes her appetite is \"OK. \" Pt noted she currently feels safe and comfortable on the unit. Pt was in agreement with treatment team.  Pt was polite and cordial during the interview process. Tommy Bautista was evaluated today and a DME order was entered for a standard walker with wheels because she requires this to successfully complete daily living tasks of eating, bathing, toileting, personal cares, ambulating, grooming, hygiene, dressing upper body, dressing lower body and taking own medications. A standard walker is necessary due to the patient's impaired ambulation and mobility restrictions and she can ambulate only by using a walker instead of a lesser assistive device, such as a cane or crutch. The need for this equipment was discussed with the patient and she understands and is in agreement.      Allergies   Allergen Reactions    Ibuprofen Other (See Comments)     Unknown     Pcn [Penicillins]        Medications Prior to Admission: haloperidol (HALDOL) 0.5 MG tablet, Take 0.5 mg by mouth 4 times daily  vitamin B-12 (CYANOCOBALAMIN) 1000 MCG tablet, Take 1,000 mcg by mouth daily  losartan (COZAAR) 50 MG tablet, Take 50 mg by mouth daily  docusate sodium (COLACE, DULCOLAX) 100 MG CAPS, Take 100 mg by mouth 2 times daily  amiodarone (CORDARONE) 200 MG tablet, Take 1 tablet by mouth daily  carvedilol (COREG) 12.5 MG tablet, Take 12.5 mg by mouth 2 times daily (with meals)   levothyroxine (SYNTHROID) 75 MCG tablet, Take 75 mcg by mouth Daily. aspirin 81 MG tablet, Take 81 mg by mouth daily. diphenhydrAMINE (BENADRYL) 50 MG/ML injection, Inject 25 mg into the muscle every 6 hours as needed  QUEtiapine (SEROQUEL) 100 MG tablet, Take 50 mg by mouth nightly   MAGNESIUM-ZINC PO, Take 1 tablet by mouth daily.     Past Medical History:   Diagnosis Date    Dilated cardiomyopathy ([address]     Grade I diastolic dysfunction 48/92/7588    Hyperlipidemia     Hypertension     Hypothyroidism     Ischemic cardiomyopathy     Mild aortic insufficiency 05/14/2019    Mild left ventricular hypertrophy 05/14/2019    Mild mitral regurgitation 05/14/2019    Myelodysplastic disease ([address]     Non-rheumatic mitral regurgitation     [facility]ox[facility] fibrillation[facility])     Subcortical vascular dementia with behavioral disturbance ([address] 07/10/2020        Patient Active Problem List   Diagnosis    Closed displaced fracture of left femoral neck ([address]    Hypertension    CAD (coronary artery disease)    Myelodysplastic disease ([address]    Dementia, in, senility, with behavioral disturbance ([address]    Acute metabolic encephalopathy    Subcortical vascular dementia with behavioral disturbance ([address]    Hypothyroidism    Non-rheumatic mitral regurgitation    Hyperlipidemia    Grade I diastolic dysfunction    Mild left ventricular hypertrophy    Mild aortic insufficiency    Mild mitral regurgitation    Tachycardia       Review of Systems    OBJECTIVE  Vital Signs:  Vitals:    07/21/20 0745   BP: (!) 91/58   Pulse: 93   Resp: 16   Temp: 97.5 °F (36.4 °C)   SpO2: 97%       Labs:  Recent Results (from the past 48 hour(s))   EKG 12 Lead    Collection Time: 07/19/20  9:51 AM   Result Value Ref Range ECG of 19-JUL-2020 09:51,  No significant change was found  Confirmed by JAC Berg ([id number]) on 7/19/2020 5:16:23 PM         PSYCHIATRIC ASSESSMENT / MENTAL STATUS EXAM:   Vitals: Blood pressure (!) 91/58, pulse 93, temperature 97.5 °F (36.4 °C), temperature source Temporal, resp. rate 16, SpO2 97 %. CONSTITUTIONAL:    Appearance: Appears stated age. Alert and oriented to person only. No acute distress. Adequate grooming and hygiene. Good eye contact. No prominent physical abnormalities. Attitude: Manner is cooperative and pleasant  Motor: No psychomotor agitation, retardation or abnormal movements noted  Speech: Clearly articulated; normal rate, volume, tone & amount. Language: intact understanding and production  Mood: euthymic  Affect: euthymic, full range, non-labile, congruent with mood and content of speech  Thought Production: Spontaneous. Thought Form: Coherent, linear, logical & goal-directed. No tangentiality or circumstantiality. No flight of ideas or loosening of associations. Thought Content/Perceptions: No SKYLER, no AVH, no delusion  Insight: impaired  Judgment: impaired  Memory: Immediate, recent, and remote appear intact, though not formally tested. Attention: maintained throughout interview  Fund of knowledge: Average  Gait/Balance: not evaluated    IMPRESSION:   Subcortical vascular dementia with behavioral disturbance       PLAN:  Psychiatric management:medication initiation and titration, group and individual therapy, safe and therapeutic environment. Status of problem/condition: Improving  Medical co-morbidities: Management per [facility] group, appreciate assistance  Legal Status:Pending  Disposition:estimated LOS: discharge no later than tomorrow  The treatment team reviewed with the patient the diagnosis and treatment recommendations to include the risks, benefits, and side effects of chosen medications.   The patient verbalized understanding and agreed with the treatment regimen as outlined above. The patient was encouraged to participate in groups. Medical records, Labs, Diagnotic tests reviewed  q15 min safety checks for safety  Interval History. Review current labs  Continue current medications  Supportive Therapy Provided  Pt had an opportunity to ask questions and address concerns  Pt encouraged to continue therapy group or individual.  Pt was in agreement with treatment plan. The risks benefits and side effects of medications were discussed with the patient, including alternatives and no treatment.     Electronically signed by CELI Deal CNP on 7/21/2020 at 9:46 AM

## 2020-07-21 NOTE — PROGRESS NOTES
Stand: SBA/CGA  Stand --> Sit:   CGA  Stand-Pivot:   Min A; unable to follow directions  Other:    Assistive device required for transfer:  RW    Functional Mobility:    Assistance: Min A-CGA  Device:   [x]   Rolling Walker     []   Standard Frances Tejeda []   Wheelchair        []   Milinda Jose Antonioler       []   4-Wheeled Frances Tejeda         []   Cardiac Frances Tejeda       []   Other:        Homemaking Tasks:  x      Additional Therapeutic activities/exercises completed this date:     [x]   ADL Training   [x]   Balance/Postural training     []   Bed/Transfer Training   [x]   Endurance Training   []   Neuromuscular Re-ed   []   Nu-step:  Time:        Level:         #Steps:       []   Rebounder:    []  Seated     []  Standing        []   Supine Ther Ex (reps/sets):     []   Seated Ther Ex (reps/sets):     []   Standing Ther Ex (reps/sets):     []   Other:      Comments:      Patient/Caregiver Education and Training:   []   [employer] Equipment Use  []   Bed Mobility/Transfer Technique/Safety  []   Energy Conservation Tips  []   Family training  []   Postural Awareness  [x]   Safety During Functional Activities  []   Reinforced Patient's Precautions   []   Progress was updated and reviewed in Rehabtracker with patient and/or family this         date.     Treatment Plan for Next Session: x     Assessment:        Treatment/Activity Tolerance:   [] Tolerated treatment with no adverse effects    [] Patient limited by fatigue  [] Patient limited by pain   [] Patient limited by medical complications:    [] Adverse reaction to Tx:   [] Significant change in status    Safety:       []  bed alarm set    []  chair alarm set    []  Pt refused alarms                []  Telesitter activated      [x]  Gait belt used during tx session      []other:       Number of Minutes/Billable Intervention  Therapeutic Exercise    ADL Self-care 15   Neuro Re-Ed    Therapeutic Activity 23   Group    Other:    TOTAL 38       Social History  Social/Functional History  Lives With: (alone)  Type of Home: House  Home Layout: One level  Home Access: Stairs to enter with rails  Bathroom Shower/Tub: Tub/Shower unit  Bathroom Toilet: Standard  Bathroom Equipment: Shower chair  Home Equipment: [address] Help From: Family  ADL Assistance: Independent(Per history but during hospital stay has aquired assist)  [address]: (Pt stated 5 days ago that she was I; poor historian)  Homemaking Responsibilities: No  Ambulation Assistance: Independent  Transfer Assistance: Independent  Active : No  Education: [employer]  Occupation: Retired  Leisure & Hobbies: Cooking  Additional Comments: P is poor historian. Information obtained from chart. Objective                                                                                    Goals:  (Update in navigator)  Short term goals  Time Frame for Short term goals: until discharge  Short term goal 1: Pt will be SBA/S with functional adl transfers to chair, toilet, and bed w/o LOB or falls  Short term goal 2: Pt will be S for UB/ SBA/ min A LB bathing  Short term goal 3: Pt will be SBA UB dressing/CGA for LB dressing  Short term goal 4: Pt will be SBA for toileting and grooming activities:   :        Plan of Care                                                                              Times per week: 5 days per week for a minimum of 60 minutes/day plus group as appropriate for 60 minutes.   Treatment to include Plan  Times per week: 2+  Current Treatment Recommendations: Strengthening, ROM, Balance Training, Functional Mobility Training, Endurance Training, Safety Education & Training, Self-Care / ADL, Patient/Caregiver Education & Training, Cognitive/Perceptual Training    Electronically signed by   Phoenix Lopez,  7/21/2020, 5:33 PM

## 2020-07-21 NOTE — PROGRESS NOTES
Pt seen by therapist from approximately [phone] to complete pt's recreation/leisure assessment. Pt continues to not have insight into reason for admission. Pt became lethargic towards the end and unable to answers questions.      Electronically signed by KARIN Rodriguez MA on 7/21/2020 at 1:13 PM

## 2020-07-21 NOTE — GROUP NOTE
Group Therapy Note    Date: 7/21/2020    Group Start Time: 1400  Group End Time: 1430  Group Topic: Group Documentation/ Psychotherapy    [address] [facility]    STEFAN Hatfield LSW    Group Therapy Note    Attendees: 3/6       Patient's Goal: \"I don't know\"    Notes: Patient unable to participate in group due to preparing to discharge home.      Discipline Responsible: /Counselor    Signature: STEFAN Hatfield LSW

## 2020-07-21 NOTE — GROUP NOTE
Group Therapy Note    Date: 7/21/2020    Group Start Time: 0830  Group End Time: 0930    Number of Participants: 6/6    Type: Morning Goals Group/ Community Meeting    Group Topic/Objective: Set Goal For The Day and to review Unit Rules and Regulations. Patient's Goal:  When asked to state a goal for the day pt stated \"I don't know. \"    Notes:  Pt presented with blunted affect during group. Pt states she is feeling \"pretty good\" and is grateful for \"family. \"  Pt reports restful sleep, but unsure of how many hours she slept. Pt unable to provide numerical values for emotions. Depression (0-10): Pt endorsed depression, but unable to rate. Anxiety (0-10): Pt endorsed anxiety, but unable to rate. Irritability/Aggitation (0-10): Pt endorsed irritability, but unable to rate. Status After Intervention:  Improved    Participation Level:  Active Listener and Interactive    Participation Quality: Appropriate, Attentive and Sharing    Speech:  normal    Thought Process/Content: Logical    Affective Functioning: Blunted    Mood: Blunted    Level of consciousness:  Alert and Attentive    Response to Learning: Able to verbalize current knowledge/experience    Endings: None Reported    Modes of Intervention: Education, Support and Socialization    Discipline Responsible: Certified Therapeutic Recreation Specialist     Electronically signed by Taran Concepcion MA on 7/21/2020 at 9:43 AM

## 2020-07-21 NOTE — BH NOTE
Patient has remained calm this shift. She has been free from outburst.  No overt distress noted. Medication compliant with all medication except refused her colace and omnicef. No aggressive behaviors noted this shift. Currently in bed resting. Monitoring will continue while patient is on the [facility] unit.

## 2020-07-21 NOTE — DISCHARGE INSTR - COC
Continuity of Care Form    Patient Name: Lillian Moore   :  10/2/1931  MRN:  [id number]    Admit date:  2020  Discharge date:  ***    Code Status Order: Full Code   Advance Directives:   [facility] Documentation     Date/Time Healthcare Directive Type of Healthcare Directive Copy in [facility] [address] Agent's Name Healthcare Agent's Phone Number    20 1515  No, patient does not have an advance directive for healthcare treatment -- -- -- -- --          Admitting Physician:  Ortiz Scott DO  PCP: Ab Magallanes MD    Discharging Nurse: [facility] Unit/Room#: 0085/1256-07  Discharging Unit Phone Number: ***    Emergency Contact:   Extended Emergency Contact Information  Primary Emergency Contact: [address] [place] [address] Road Phone: [phone]  Relation: Child  Secondary Emergency Contact: Delgado Leny Phone: [phone]  Relation: Child    Past Surgical History:  Past Surgical History:   Procedure Laterality Date    HEMIARTHROPLASTY HIP Left 2019    HIP HEMIARTHROPLASTY performed by Jeff Ho MD at [facility]       Immunization History: There is no immunization history on file for this patient.     Active Problems:  Patient Active Problem List   Diagnosis Code    Closed displaced fracture of left femoral neck ([facility] S72.002A    Hypertension I10    CAD (coronary artery disease) I25.10    Myelodysplastic disease ([address] C94.6    Dementia, in, senility, with behavioral disturbance ([address] F03.91    Acute metabolic encephalopathy J76.31    Subcortical vascular dementia with behavioral disturbance ([address] F01.51    Hypothyroidism E03.9    Non-rheumatic mitral regurgitation I34.0    Hyperlipidemia E78.5    Grade I diastolic dysfunction O81.7    Mild left ventricular hypertrophy I51.7    Mild aortic insufficiency I35.1    Mild mitral regurgitation I34.0    Tachycardia R00.0       Isolation/Infection:   Isolation          No Bearin}  Other Medical Equipment (for information only, NOT a DME order):  {EQUIPMENT:[id number]}  Other Treatments: ***    Patient's personal belongings (please select all that are sent with patient):  {CHP DME Belongings:646391321}    RN SIGNATURE:  {Esignature:[id number]}    CASE MANAGEMENT/SOCIAL WORK SECTION    Inpatient Status Date: ***    Readmission Risk Assessment Score:  Readmission Risk              Risk of Unplanned Readmission:        18           Discharging to Facility/ Agency   · Name:   · Address:  · Phone:  · Fax:    Dialysis Facility (if applicable)   · Name:  · Address:  · Dialysis Schedule:  · Phone:  · Fax:    / signature: {Esignature:[id number]}    PHYSICIAN SECTION    Prognosis: Good    Condition at Discharge: Stable    Rehab Potential (if transferring to Rehab): Good    Recommended Labs or Other Treatments After Discharge: per patients pcp    Physician Certification: I certify the above information and transfer of Lillian Moore  is necessary for the continuing treatment of the diagnosis listed and that she requires Home Care for greater 30 days.      Update Admission H&P: No change in H&P    PHYSICIAN SIGNATURE:  {Esignature:[id number]}

## 2020-07-22 NOTE — CARE COORDINATION
07/22/20  9:30 AM  LSW called Layla Paniagua ([phone]) to ask if patient's 1 day stay was covered. Andrei Rutledge states she cannot find the faxed pre-auth information. LSW collected clinicals along with confirmation page from fax sent on 07/20/20 and discharge information sent as well to [phone]. LSW awaits determination. 10:15 AM  Discharge summary faxed to [address] [place] [address] at [phone].    11:50 AM  LSW realized that patient follow-up had not transferred to discharge summary. LSW called daughter Albino Peabody ([phone]) to inform of patient follow-up. [place] verbalized understanding.

## 2020-07-23 RX ORDER — AMIODARONE HYDROCHLORIDE 200 MG/1
200 TABLET ORAL DAILY
Qty: 30 TABLET | Refills: 2 | Status: SHIPPED | OUTPATIENT
Start: 2020-07-23 | End: 2020-07-24 | Stop reason: SDUPTHER

## 2020-07-24 RX ORDER — AMIODARONE HYDROCHLORIDE 200 MG/1
200 TABLET ORAL DAILY
Qty: 30 TABLET | Refills: 2 | Status: SHIPPED | OUTPATIENT
Start: 2020-07-24

## 2020-09-02 ENCOUNTER — HOSPITAL ENCOUNTER (OUTPATIENT)
Age: 85
Setting detail: SPECIMEN
Discharge: HOME | End: 2020-09-02
Payer: COMMERCIAL

## 2020-09-02 LAB
ALBUMIN SERPL-MCNC: 4.3 GM/DL (ref 3.4–5)
ALP BLD-CCNC: 87 IU/L (ref 40–128)
ALT SERPL-CCNC: 10 U/L (ref 10–40)
ANION GAP SERPL CALCULATED.3IONS-SCNC: 10 MMOL/L (ref 4–16)
AST SERPL-CCNC: 17 IU/L (ref 15–37)
BACTERIA: ABNORMAL /HPF
BASOPHILS ABSOLUTE: 0.1 K/CU MM
BASOPHILS RELATIVE PERCENT: 1.2 % (ref 0–1)
BILIRUB SERPL-MCNC: 0.5 MG/DL (ref 0–1)
BILIRUBIN URINE: NEGATIVE MG/DL
BLOOD, URINE: NEGATIVE
BUN BLDV-MCNC: 31 MG/DL (ref 6–23)
CALCIUM SERPL-MCNC: 8.9 MG/DL (ref 8.3–10.6)
CHLORIDE BLD-SCNC: 105 MMOL/L (ref 99–110)
CHOLESTEROL: 192 MG/DL
CLARITY: ABNORMAL
CO2: 26 MMOL/L (ref 21–32)
COLOR: YELLOW
CREAT SERPL-MCNC: 0.9 MG/DL (ref 0.6–1.1)
DIFFERENTIAL TYPE: ABNORMAL
EOSINOPHILS ABSOLUTE: 0.2 K/CU MM
EOSINOPHILS RELATIVE PERCENT: 4.1 % (ref 0–3)
GFR AFRICAN AMERICAN: >60 ML/MIN/1.73M2
GFR NON-AFRICAN AMERICAN: 59 ML/MIN/1.73M2
GLUCOSE BLD-MCNC: 98 MG/DL (ref 70–99)
GLUCOSE, URINE: NEGATIVE MG/DL
HCT VFR BLD CALC: 32.9 % (ref 37–47)
HDLC SERPL-MCNC: 78 MG/DL
HEMOGLOBIN: 10.3 GM/DL (ref 12.5–16)
IMMATURE NEUTROPHIL %: 0.4 % (ref 0–0.43)
KETONES, URINE: NEGATIVE MG/DL
LDL CHOLESTEROL DIRECT: 97 MG/DL
LEUKOCYTE ESTERASE, URINE: ABNORMAL
LYMPHOCYTES ABSOLUTE: 1 K/CU MM
LYMPHOCYTES RELATIVE PERCENT: 19.5 % (ref 24–44)
MCH RBC QN AUTO: 29.1 PG (ref 27–31)
MCHC RBC AUTO-ENTMCNC: 31.3 % (ref 32–36)
MCV RBC AUTO: 92.9 FL (ref 78–100)
MONOCYTES ABSOLUTE: 0.6 K/CU MM
MONOCYTES RELATIVE PERCENT: 12.8 % (ref 0–4)
MUCUS: ABNORMAL HPF
NITRITE URINE, QUANTITATIVE: NEGATIVE
NUCLEATED RBC %: 0 %
PDW BLD-RTO: 15.6 % (ref 11.7–14.9)
PH, URINE: 6 (ref 5–8)
PLATELET # BLD: 246 K/CU MM (ref 140–440)
PMV BLD AUTO: 9.2 FL (ref 7.5–11.1)
POTASSIUM SERPL-SCNC: 4.3 MMOL/L (ref 3.5–5.1)
PROTEIN UA: NEGATIVE MG/DL
RBC # BLD: 3.54 M/CU MM (ref 4.2–5.4)
RBC URINE: 5 /HPF (ref 0–6)
SEGMENTED NEUTROPHILS ABSOLUTE COUNT: 3.1 K/CU MM
SEGMENTED NEUTROPHILS RELATIVE PERCENT: 62 % (ref 36–66)
SODIUM BLD-SCNC: 141 MMOL/L (ref 135–145)
SPECIFIC GRAVITY UA: 1.02 (ref 1–1.03)
SQUAMOUS EPITHELIAL: 1 /HPF
TOTAL IMMATURE NEUTOROPHIL: 0.02 K/CU MM
TOTAL NUCLEATED RBC: 0 K/CU MM
TOTAL PROTEIN: 6.3 GM/DL (ref 6.4–8.2)
TRICHOMONAS: ABNORMAL /HPF
TRIGL SERPL-MCNC: 75 MG/DL
TSH HIGH SENSITIVITY: 3.41 UIU/ML (ref 0.27–4.2)
UROBILINOGEN, URINE: NORMAL MG/DL (ref 0.2–1)
WBC # BLD: 4.9 K/CU MM (ref 4–10.5)
WBC UA: 17 /HPF (ref 0–5)

## 2020-09-02 PROCEDURE — 81001 URINALYSIS AUTO W/SCOPE: CPT

## 2020-09-02 PROCEDURE — 85025 COMPLETE CBC W/AUTO DIFF WBC: CPT

## 2020-09-02 PROCEDURE — 84443 ASSAY THYROID STIM HORMONE: CPT

## 2020-09-02 PROCEDURE — 80053 COMPREHEN METABOLIC PANEL: CPT

## 2020-09-02 PROCEDURE — 83721 ASSAY OF BLOOD LIPOPROTEIN: CPT

## 2020-09-02 PROCEDURE — 80061 LIPID PANEL: CPT

## 2022-01-26 NOTE — CARE COORDINATION
3:30pm- Voicemail stated that the office hours are M-F 8-4:30pm. Left message for Patrice Sidhu with [address] [place] [address] Street. Notified him of patient's admission. Awaiting call back. 4:05pm- Faxed Clinical to [address] [place] [address]. Awaiting determination. stretch as instructed at visit

## 2022-08-01 ENCOUNTER — HOSPITAL ENCOUNTER (EMERGENCY)
Age: 87
Discharge: HOME | End: 2022-08-01
Attending: EMERGENCY MEDICINE
Payer: COMMERCIAL

## 2022-08-01 VITALS
SYSTOLIC BLOOD PRESSURE: 123 MMHG | RESPIRATION RATE: 18 BRPM | BODY MASS INDEX: 20.06 KG/M2 | WEIGHT: 101 LBS | HEART RATE: 61 BPM | OXYGEN SATURATION: 97 % | TEMPERATURE: 98.7 F | DIASTOLIC BLOOD PRESSURE: 56 MMHG

## 2022-08-01 DIAGNOSIS — M25.562 ACUTE PAIN OF LEFT KNEE: Primary | ICD-10-CM

## 2022-08-01 DIAGNOSIS — S80.01XA CONTUSION OF RIGHT KNEE, INITIAL ENCOUNTER: ICD-10-CM

## 2022-08-01 PROCEDURE — 99282 EMERGENCY DEPT VISIT SF MDM: CPT

## 2022-08-01 ASSESSMENT — PAIN - FUNCTIONAL ASSESSMENT: PAIN_FUNCTIONAL_ASSESSMENT: NONE - DENIES PAIN

## 2022-08-01 NOTE — ED PROVIDER NOTES
Emergency Department Encounter    Patient: Dk Bartlett  MRN: [id number]  : 10/2/1931  Date of Evaluation: 2022  ED Provider:  Dani Marcano MD    Triage Chief Complaint:   Knee Pain (Pt family at bedside reports pt has been complaining of Lt knee pain. Pt family reports pt has severe dementia and are unsure if pt had a fall. Pt family reports pt is at baseline. )    [place]:  Dk Bartlett is a 80 y.o. female that presents with history of dementia complaining of left knee pain at home. Patient has been ambulatory. Unknown if there is any trauma. Patient currently denies any pain. No lower extremity edema. No other complaints. Additional history is limited given the patient's history of dementia. ROS - see HPI, below listed is current ROS at time of my eval:  General:  No fevers  Musculoskeletal: Left knee pain  Skin:  No rash, no pruritis, no easy bruising  Neurologic:   no extremity numbness, no extremity tingling, no extremity weakness  Extremities:  no edema    Past Medical History:   Diagnosis Date    Dilated cardiomyopathy ([address]     Grade I diastolic dysfunction     Hyperlipidemia     Hypertension     Hypothyroidism     Ischemic cardiomyopathy     Mild aortic insufficiency 2019    Mild left ventricular hypertrophy 2019    Mild mitral regurgitation 2019    Myelodysplastic disease ([address]     Non-rheumatic mitral regurgitation     PAF (paroxysmal atrial fibrillation) ([facility])     Subcortical vascular dementia with behavioral disturbance ([address] 07/10/2020     Past Surgical History:   Procedure Laterality Date    HEMIARTHROPLASTY HIP Left 2019    HIP HEMIARTHROPLASTY performed by Karmen Wilkerson MD at [place] [address] reviewed. No pertinent family history. Social History     Socioeconomic History    Marital status:       Spouse name: Not on file    Number of children: Not on file    Years of education: Not on file    Highest education level: Not on file VS:    ED Triage Vitals [08/01/22 1303]   Enc Vitals Group      BP (!) 123/56      Heart Rate 61      Resp 18      Temp 98.7 °F (37.1 °C)      Temp Source Infrared      SpO2 97 %      Weight 101 lb (45.8 kg)      Height       Head Circumference       Peak Flow       Pain Score       Pain Loc       Pain Edu? Excl. in [address]? General appearance:  No acute distress. Skin:  Warm. Dry. Ears, nose, mouth and throat:  Oral mucosa moist   Extremity:  No swelling. Normal ROM   no skin erythema. No pain with active or passive range of motion. No bony tenderness to palpation of the bilateral hips, pelvis, long bones or joints of bilateral lower extremities. Patient does have some bruising over the anterior knee. Patient was able to get up in the exam room and walk without any pain or difficulty. Patient is neurovascularly intact in the bilateral lower extremities. Perfusion:  intact          Neurological:  Alert and oriented times 3. Motor and sensory exam intact to affected extremity    I have reviewed and interpreted all of the currently available lab results from this visit (if applicable):  No results found for this visit on 08/01/22. Radiographs (if obtained):  Radiologist's Report Reviewed:  No results found. MDM:   Patient presented with musculoskeletal complaints. Patient currently has no symptoms and exam is benign. Patient's family is comfortable with no additional evaluation. Patient was able to stand and walk in the emergency department without difficulty or pain. There is no ligamentous instability of the knee. Patient is neurovascularly intact. No lower extremity edema to suggest DVT. No evidence of emergent condition at this time. There is no evidence of bony injury, therefore imaging is not. Patient does not have any evidence of compartment syndrome, necrotizing process, deep venous thrombosis, or neurovascular deficits.   The patient understands that at this time there is no

## 2022-08-01 NOTE — DISCHARGE INSTRUCTIONS
Return immediately to the emergency department if you experience new or worsened symptoms, return of knee pain, inability to walk, chest pain, shortness of breath, or for any other concerns.

## 2022-08-01 NOTE — ED NOTES
Discharge instructions were reviewed and the patient will follow up with the PCP. The family voiced understanding of the instructions.                    Danny Kirkpatrick RN  08/01/22 6581

## (undated) DEVICE — SOLUTION IV 1000ML 0.9% SOD CHL FOR IRRIG PLAS CONT

## (undated) DEVICE — LINER,SEMI-RIGID,3000CC,50EA/CS: Brand: MEDLINE

## (undated) DEVICE — HEWSON SUTURE RETRIEVER: Brand: HEWSON SUTURE RETRIEVER

## (undated) DEVICE — GLOVE ORANGE PI 8   MSG9080

## (undated) DEVICE — ANESTHESIA CIRCUIT ADULT-LF: Brand: MEDLINE INDUSTRIES, INC.

## (undated) DEVICE — LINER SUCT CANSTR 1500CC SEMI RIG W/ POR HYDROPHOBIC SHUT

## (undated) DEVICE — DRESSING TRNSPAR W2XL2.75IN FLM SHT SEMIPERMEABLE WIND

## (undated) DEVICE — DRESSING TRNSPAR W8XL12IN FLM SURESITE 123

## (undated) DEVICE — TOWEL,OR,DSP,ST,BLUE,STD,6/PK,12PK/CS: Brand: MEDLINE

## (undated) DEVICE — DUAL CUT SAGITTAL BLADE

## (undated) DEVICE — FAN SPRAY KIT: Brand: PULSAVAC®

## (undated) DEVICE — TUBING, SUCTION, 9/32" X 10', STRAIGHT: Brand: MEDLINE

## (undated) DEVICE — HOOD, PEEL-AWAY: Brand: FLYTE

## (undated) DEVICE — PACK PROCEDURE SURG TOT HIP LF

## (undated) DEVICE — PILLOW POS W15XH6XL22IN RASPBERRY FOAM ABD W/ STRP DISP FOR

## (undated) DEVICE — PAD,ABDOMINAL,5"X9",ST,LF,25/BX: Brand: MEDLINE INDUSTRIES, INC.

## (undated) DEVICE — ELECTRODE ES AD CRDLSS PT RET REM POLYHESIVE

## (undated) DEVICE — BIT DRL L110MM DIA2MM QUIK CONN W/O STP N RADLUC REUSE

## (undated) DEVICE — GLOVE SURG SZ 8 L12IN FNGR THK87MIL WHT LTX FREE

## (undated) DEVICE — TRAY EPI 25GA L3.5IN CONTAIN BPA DEHP PVC PENCAN

## (undated) DEVICE — 3M™ IOBAN™ 2 ANTIMICROBIAL INCISE DRAPE 6650EZ: Brand: IOBAN™ 2

## (undated) DEVICE — SOLUTION IV IRRIG WATER 1000ML POUR BRL 2F7114

## (undated) DEVICE — GLOVE SURG SZ 65 L12IN FNGR THK87MIL WHT LTX FREE

## (undated) DEVICE — CORD ES L15FT PT RET REUSE VALLEYLAB REM

## (undated) DEVICE — Device

## (undated) DEVICE — Z INACTIVE USE 2660664 SOLUTION IRRIG 3000ML 0.9% SOD CHL USP UROMATIC PLAS CONT

## (undated) DEVICE — SUTURE ABSORBABLE BRAIDED 2-0 CT-1 27 IN UD VICRYL J259H

## (undated) DEVICE — SUTURE PDS II SZ 1 L96IN ABSRB VLT TP-1 L65MM 1/2 CIR Z880G

## (undated) DEVICE — 3M™ MICROFOAM™ TAPE 1528-4: Brand: 3M™ MICROFOAM™

## (undated) DEVICE — CHLORAPREP 26ML ORANGE

## (undated) DEVICE — DUAL SPIKE Y-CONNECTOR SET, PACKAGED: Brand: PULSAVAC®

## (undated) DEVICE — SOLUTION IV IRRIG POUR BRL 0.9% SODIUM CHL 2F7124

## (undated) DEVICE — YANKAUER,FLEXIBLE HANDLE,REGLR CAPACITY: Brand: MEDLINE INDUSTRIES, INC.

## (undated) DEVICE — DRAPE SHEET ULTRAGARD: Brand: MEDLINE